# Patient Record
Sex: MALE | Race: WHITE | NOT HISPANIC OR LATINO | URBAN - METROPOLITAN AREA
[De-identification: names, ages, dates, MRNs, and addresses within clinical notes are randomized per-mention and may not be internally consistent; named-entity substitution may affect disease eponyms.]

---

## 2020-01-05 ENCOUNTER — INPATIENT (INPATIENT)
Facility: HOSPITAL | Age: 85
LOS: 7 days | Discharge: HOME HEALTH | DRG: 871 | End: 2020-01-13
Attending: INTERNAL MEDICINE | Admitting: INTERNAL MEDICINE
Payer: MEDICARE

## 2020-01-05 VITALS — RESPIRATION RATE: 18 BRPM | HEART RATE: 75 BPM | SYSTOLIC BLOOD PRESSURE: 110 MMHG | DIASTOLIC BLOOD PRESSURE: 54 MMHG

## 2020-01-05 DIAGNOSIS — A41.9 SEPSIS, UNSPECIFIED ORGANISM: ICD-10-CM

## 2020-01-05 DIAGNOSIS — I73.9 PERIPHERAL VASCULAR DISEASE, UNSPECIFIED: ICD-10-CM

## 2020-01-05 DIAGNOSIS — E78.5 HYPERLIPIDEMIA, UNSPECIFIED: ICD-10-CM

## 2020-01-05 DIAGNOSIS — E11.9 TYPE 2 DIABETES MELLITUS WITHOUT COMPLICATIONS: ICD-10-CM

## 2020-01-05 DIAGNOSIS — I10 ESSENTIAL (PRIMARY) HYPERTENSION: ICD-10-CM

## 2020-01-05 DIAGNOSIS — I73.9 PERIPHERAL VASCULAR DISEASE, UNSPECIFIED: Chronic | ICD-10-CM

## 2020-01-05 DIAGNOSIS — J69.0 PNEUMONITIS DUE TO INHALATION OF FOOD AND VOMIT: ICD-10-CM

## 2020-01-05 DIAGNOSIS — Z91.89 OTHER SPECIFIED PERSONAL RISK FACTORS, NOT ELSEWHERE CLASSIFIED: ICD-10-CM

## 2020-01-05 DIAGNOSIS — Z29.9 ENCOUNTER FOR PROPHYLACTIC MEASURES, UNSPECIFIED: ICD-10-CM

## 2020-01-05 DIAGNOSIS — R74.0 NONSPECIFIC ELEVATION OF LEVELS OF TRANSAMINASE AND LACTIC ACID DEHYDROGENASE [LDH]: ICD-10-CM

## 2020-01-05 DIAGNOSIS — I25.10 ATHEROSCLEROTIC HEART DISEASE OF NATIVE CORONARY ARTERY WITHOUT ANGINA PECTORIS: ICD-10-CM

## 2020-01-05 LAB
ACANTHOCYTES BLD QL SMEAR: SLIGHT — SIGNIFICANT CHANGE UP
ALBUMIN SERPL ELPH-MCNC: 2.2 G/DL — LOW (ref 3.3–5)
ALBUMIN SERPL ELPH-MCNC: 2.3 G/DL — LOW (ref 3.3–5)
ALP SERPL-CCNC: 132 U/L — HIGH (ref 40–120)
ALP SERPL-CCNC: SIGNIFICANT CHANGE UP U/L (ref 40–120)
ALT FLD-CCNC: 68 U/L — HIGH (ref 10–45)
ALT FLD-CCNC: SIGNIFICANT CHANGE UP U/L (ref 10–45)
ANION GAP SERPL CALC-SCNC: 14 MMOL/L — SIGNIFICANT CHANGE UP (ref 5–17)
ANION GAP SERPL CALC-SCNC: 14 MMOL/L — SIGNIFICANT CHANGE UP (ref 5–17)
ANISOCYTOSIS BLD QL: SLIGHT — SIGNIFICANT CHANGE UP
APPEARANCE UR: CLEAR — SIGNIFICANT CHANGE UP
APTT BLD: 26.6 SEC — LOW (ref 27.5–36.3)
AST SERPL-CCNC: 66 U/L — HIGH (ref 10–40)
AST SERPL-CCNC: SIGNIFICANT CHANGE UP U/L (ref 10–40)
B-OH-BUTYR SERPL-SCNC: 0.2 MMOL/L — SIGNIFICANT CHANGE UP
BASE EXCESS BLDV CALC-SCNC: -2.2 MMOL/L — SIGNIFICANT CHANGE UP
BASOPHILS # BLD AUTO: 0 K/UL — SIGNIFICANT CHANGE UP (ref 0–0.2)
BASOPHILS NFR BLD AUTO: 0 % — SIGNIFICANT CHANGE UP (ref 0–2)
BILIRUB SERPL-MCNC: 1 MG/DL — SIGNIFICANT CHANGE UP (ref 0.2–1.2)
BILIRUB SERPL-MCNC: 1 MG/DL — SIGNIFICANT CHANGE UP (ref 0.2–1.2)
BILIRUB UR-MCNC: NEGATIVE — SIGNIFICANT CHANGE UP
BUN SERPL-MCNC: 58 MG/DL — HIGH (ref 7–23)
BUN SERPL-MCNC: 61 MG/DL — HIGH (ref 7–23)
BURR CELLS BLD QL SMEAR: PRESENT — SIGNIFICANT CHANGE UP
CALCIUM SERPL-MCNC: 8.7 MG/DL — SIGNIFICANT CHANGE UP (ref 8.4–10.5)
CALCIUM SERPL-MCNC: 9.1 MG/DL — SIGNIFICANT CHANGE UP (ref 8.4–10.5)
CHLORIDE SERPL-SCNC: 107 MMOL/L — SIGNIFICANT CHANGE UP (ref 96–108)
CHLORIDE SERPL-SCNC: 110 MMOL/L — HIGH (ref 96–108)
CO2 SERPL-SCNC: 16 MMOL/L — LOW (ref 22–31)
CO2 SERPL-SCNC: 19 MMOL/L — LOW (ref 22–31)
COLOR SPEC: YELLOW — SIGNIFICANT CHANGE UP
CREAT SERPL-MCNC: 1.8 MG/DL — HIGH (ref 0.5–1.3)
CREAT SERPL-MCNC: 1.96 MG/DL — HIGH (ref 0.5–1.3)
DACRYOCYTES BLD QL SMEAR: SLIGHT — SIGNIFICANT CHANGE UP
DIFF PNL FLD: NEGATIVE — SIGNIFICANT CHANGE UP
EOSINOPHIL # BLD AUTO: 0 K/UL — SIGNIFICANT CHANGE UP (ref 0–0.5)
EOSINOPHIL NFR BLD AUTO: 0 % — SIGNIFICANT CHANGE UP (ref 0–6)
FLU A RESULT: SIGNIFICANT CHANGE UP
FLU A RESULT: SIGNIFICANT CHANGE UP
FLUAV AG NPH QL: SIGNIFICANT CHANGE UP
FLUBV AG NPH QL: SIGNIFICANT CHANGE UP
GLUCOSE BLDC GLUCOMTR-MCNC: 320 MG/DL — HIGH (ref 70–99)
GLUCOSE BLDC GLUCOMTR-MCNC: 341 MG/DL — HIGH (ref 70–99)
GLUCOSE SERPL-MCNC: 382 MG/DL — HIGH (ref 70–99)
GLUCOSE SERPL-MCNC: 413 MG/DL — HIGH (ref 70–99)
GLUCOSE UR QL: NEGATIVE — SIGNIFICANT CHANGE UP
HCO3 BLDV-SCNC: 24 MMOL/L — SIGNIFICANT CHANGE UP (ref 20–27)
HCT VFR BLD CALC: 30.9 % — LOW (ref 39–50)
HCT VFR BLD CALC: 38.9 % — LOW (ref 39–50)
HGB BLD-MCNC: 11.8 G/DL — LOW (ref 13–17)
HGB BLD-MCNC: 9.6 G/DL — LOW (ref 13–17)
HYPOCHROMIA BLD QL: SLIGHT — SIGNIFICANT CHANGE UP
INR BLD: 1.25 — HIGH (ref 0.88–1.16)
KETONES UR-MCNC: NEGATIVE — SIGNIFICANT CHANGE UP
LACTATE SERPL-SCNC: 2.1 MMOL/L — HIGH (ref 0.5–2)
LACTATE SERPL-SCNC: 2.5 MMOL/L — HIGH (ref 0.5–2)
LACTATE SERPL-SCNC: 3.3 MMOL/L — HIGH (ref 0.5–2)
LACTATE SERPL-SCNC: 3.5 MMOL/L — HIGH (ref 0.5–2)
LACTATE SERPL-SCNC: 3.5 MMOL/L — HIGH (ref 0.5–2)
LEUKOCYTE ESTERASE UR-ACNC: NEGATIVE — SIGNIFICANT CHANGE UP
LG PLATELETS BLD QL AUTO: SLIGHT — SIGNIFICANT CHANGE UP
LYMPHOCYTES # BLD AUTO: 1.1 K/UL — SIGNIFICANT CHANGE UP (ref 1–3.3)
LYMPHOCYTES # BLD AUTO: 3 % — LOW (ref 13–44)
MANUAL SMEAR VERIFICATION: SIGNIFICANT CHANGE UP
MCHC RBC-ENTMCNC: 26.2 PG — LOW (ref 27–34)
MCHC RBC-ENTMCNC: 26.4 PG — LOW (ref 27–34)
MCHC RBC-ENTMCNC: 30.3 GM/DL — LOW (ref 32–36)
MCHC RBC-ENTMCNC: 31.1 GM/DL — LOW (ref 32–36)
MCV RBC AUTO: 84.9 FL — SIGNIFICANT CHANGE UP (ref 80–100)
MCV RBC AUTO: 86.3 FL — SIGNIFICANT CHANGE UP (ref 80–100)
MONOCYTES # BLD AUTO: 0.37 K/UL — SIGNIFICANT CHANGE UP (ref 0–0.9)
MONOCYTES NFR BLD AUTO: 1 % — LOW (ref 2–14)
NEUTROPHILS # BLD AUTO: 35.36 K/UL — HIGH (ref 1.8–7.4)
NEUTROPHILS NFR BLD AUTO: 95 % — HIGH (ref 43–77)
NEUTS BAND # BLD: 1 % — SIGNIFICANT CHANGE UP (ref 0–8)
NITRITE UR-MCNC: NEGATIVE — SIGNIFICANT CHANGE UP
NRBC # BLD: 0 /100 WBCS — SIGNIFICANT CHANGE UP (ref 0–0)
NRBC # BLD: 0 /100 — SIGNIFICANT CHANGE UP (ref 0–0)
NRBC # BLD: SIGNIFICANT CHANGE UP /100 WBCS (ref 0–0)
OVALOCYTES BLD QL SMEAR: SLIGHT — SIGNIFICANT CHANGE UP
PCO2 BLDV: 45 MMHG — SIGNIFICANT CHANGE UP (ref 41–51)
PH BLDV: 7.34 — SIGNIFICANT CHANGE UP (ref 7.32–7.43)
PH UR: 5.5 — SIGNIFICANT CHANGE UP (ref 5–8)
PLAT MORPH BLD: ABNORMAL
PLATELET # BLD AUTO: 245 K/UL — SIGNIFICANT CHANGE UP (ref 150–400)
PLATELET # BLD AUTO: 262 K/UL — SIGNIFICANT CHANGE UP (ref 150–400)
PO2 BLDV: 16 MMHG — SIGNIFICANT CHANGE UP
POIKILOCYTOSIS BLD QL AUTO: SIGNIFICANT CHANGE UP
POLYCHROMASIA BLD QL SMEAR: SLIGHT — SIGNIFICANT CHANGE UP
POTASSIUM SERPL-MCNC: 5.1 MMOL/L — SIGNIFICANT CHANGE UP (ref 3.5–5.3)
POTASSIUM SERPL-MCNC: SIGNIFICANT CHANGE UP MMOL/L (ref 3.5–5.3)
POTASSIUM SERPL-SCNC: 5.1 MMOL/L — SIGNIFICANT CHANGE UP (ref 3.5–5.3)
POTASSIUM SERPL-SCNC: SIGNIFICANT CHANGE UP MMOL/L (ref 3.5–5.3)
PROT SERPL-MCNC: 6.4 G/DL — SIGNIFICANT CHANGE UP (ref 6–8.3)
PROT SERPL-MCNC: 6.8 G/DL — SIGNIFICANT CHANGE UP (ref 6–8.3)
PROT UR-MCNC: NEGATIVE MG/DL — SIGNIFICANT CHANGE UP
PROTHROM AB SERPL-ACNC: 14.4 SEC — HIGH (ref 10–12.9)
RBC # BLD: 3.64 M/UL — LOW (ref 4.2–5.8)
RBC # BLD: 4.51 M/UL — SIGNIFICANT CHANGE UP (ref 4.2–5.8)
RBC # FLD: 17.2 % — HIGH (ref 10.3–14.5)
RBC # FLD: 18.1 % — HIGH (ref 10.3–14.5)
RBC BLD AUTO: ABNORMAL
RSV RESULT: SIGNIFICANT CHANGE UP
RSV RNA RESP QL NAA+PROBE: SIGNIFICANT CHANGE UP
SAO2 % BLDV: 18 % — SIGNIFICANT CHANGE UP
SODIUM SERPL-SCNC: 137 MMOL/L — SIGNIFICANT CHANGE UP (ref 135–145)
SODIUM SERPL-SCNC: 143 MMOL/L — SIGNIFICANT CHANGE UP (ref 135–145)
SP GR SPEC: >=1.03 — SIGNIFICANT CHANGE UP (ref 1–1.03)
TROPONIN T SERPL-MCNC: 0.22 NG/ML — CRITICAL HIGH (ref 0–0.01)
UROBILINOGEN FLD QL: 0.2 E.U./DL — SIGNIFICANT CHANGE UP
WBC # BLD: 32.83 K/UL — HIGH (ref 3.8–10.5)
WBC # BLD: 36.83 K/UL — HIGH (ref 3.8–10.5)
WBC # FLD AUTO: 32.83 K/UL — HIGH (ref 3.8–10.5)
WBC # FLD AUTO: 36.83 K/UL — HIGH (ref 3.8–10.5)

## 2020-01-05 PROCEDURE — 99231 SBSQ HOSP IP/OBS SF/LOW 25: CPT | Mod: GC

## 2020-01-05 PROCEDURE — 93010 ELECTROCARDIOGRAM REPORT: CPT

## 2020-01-05 PROCEDURE — 71045 X-RAY EXAM CHEST 1 VIEW: CPT | Mod: 26

## 2020-01-05 PROCEDURE — 99285 EMERGENCY DEPT VISIT HI MDM: CPT

## 2020-01-05 PROCEDURE — 73630 X-RAY EXAM OF FOOT: CPT | Mod: 26,50

## 2020-01-05 RX ORDER — POVIDONE-IODINE 5 %
1 AEROSOL (ML) TOPICAL ONCE
Refills: 0 | Status: COMPLETED | OUTPATIENT
Start: 2020-01-05 | End: 2020-01-10

## 2020-01-05 RX ORDER — DEXTROSE 50 % IN WATER 50 %
25 SYRINGE (ML) INTRAVENOUS ONCE
Refills: 0 | Status: DISCONTINUED | OUTPATIENT
Start: 2020-01-05 | End: 2020-01-13

## 2020-01-05 RX ORDER — INSULIN GLARGINE 100 [IU]/ML
10 INJECTION, SOLUTION SUBCUTANEOUS AT BEDTIME
Refills: 0 | Status: DISCONTINUED | OUTPATIENT
Start: 2020-01-05 | End: 2020-01-13

## 2020-01-05 RX ORDER — SODIUM CHLORIDE 9 MG/ML
1000 INJECTION INTRAMUSCULAR; INTRAVENOUS; SUBCUTANEOUS ONCE
Refills: 0 | Status: COMPLETED | OUTPATIENT
Start: 2020-01-05 | End: 2020-01-05

## 2020-01-05 RX ORDER — GLUCAGON INJECTION, SOLUTION 0.5 MG/.1ML
1 INJECTION, SOLUTION SUBCUTANEOUS ONCE
Refills: 0 | Status: DISCONTINUED | OUTPATIENT
Start: 2020-01-05 | End: 2020-01-13

## 2020-01-05 RX ORDER — INSULIN LISPRO 100/ML
VIAL (ML) SUBCUTANEOUS
Refills: 0 | Status: DISCONTINUED | OUTPATIENT
Start: 2020-01-05 | End: 2020-01-13

## 2020-01-05 RX ORDER — SIMVASTATIN 20 MG/1
20 TABLET, FILM COATED ORAL AT BEDTIME
Refills: 0 | Status: DISCONTINUED | OUTPATIENT
Start: 2020-01-05 | End: 2020-01-13

## 2020-01-05 RX ORDER — SODIUM CHLORIDE 9 MG/ML
500 INJECTION INTRAMUSCULAR; INTRAVENOUS; SUBCUTANEOUS ONCE
Refills: 0 | Status: COMPLETED | OUTPATIENT
Start: 2020-01-05 | End: 2020-01-05

## 2020-01-05 RX ORDER — PIPERACILLIN AND TAZOBACTAM 4; .5 G/20ML; G/20ML
3.38 INJECTION, POWDER, LYOPHILIZED, FOR SOLUTION INTRAVENOUS ONCE
Refills: 0 | Status: COMPLETED | OUTPATIENT
Start: 2020-01-05 | End: 2020-01-05

## 2020-01-05 RX ORDER — VANCOMYCIN HCL 1 G
1000 VIAL (EA) INTRAVENOUS ONCE
Refills: 0 | Status: COMPLETED | OUTPATIENT
Start: 2020-01-05 | End: 2020-01-05

## 2020-01-05 RX ORDER — ACETAMINOPHEN 500 MG
650 TABLET ORAL ONCE
Refills: 0 | Status: COMPLETED | OUTPATIENT
Start: 2020-01-05 | End: 2020-01-05

## 2020-01-05 RX ORDER — DEXTROSE 50 % IN WATER 50 %
12.5 SYRINGE (ML) INTRAVENOUS ONCE
Refills: 0 | Status: DISCONTINUED | OUTPATIENT
Start: 2020-01-05 | End: 2020-01-13

## 2020-01-05 RX ORDER — SODIUM CHLORIDE 9 MG/ML
1000 INJECTION, SOLUTION INTRAVENOUS
Refills: 0 | Status: DISCONTINUED | OUTPATIENT
Start: 2020-01-05 | End: 2020-01-13

## 2020-01-05 RX ORDER — INSULIN HUMAN 100 [IU]/ML
4 INJECTION, SOLUTION SUBCUTANEOUS ONCE
Refills: 0 | Status: COMPLETED | OUTPATIENT
Start: 2020-01-05 | End: 2020-01-05

## 2020-01-05 RX ORDER — AMPICILLIN SODIUM AND SULBACTAM SODIUM 250; 125 MG/ML; MG/ML
3 INJECTION, POWDER, FOR SUSPENSION INTRAMUSCULAR; INTRAVENOUS EVERY 12 HOURS
Refills: 0 | Status: DISCONTINUED | OUTPATIENT
Start: 2020-01-05 | End: 2020-01-09

## 2020-01-05 RX ORDER — CLOPIDOGREL BISULFATE 75 MG/1
75 TABLET, FILM COATED ORAL DAILY
Refills: 0 | Status: DISCONTINUED | OUTPATIENT
Start: 2020-01-05 | End: 2020-01-13

## 2020-01-05 RX ORDER — LEVOTHYROXINE SODIUM 125 MCG
125 TABLET ORAL DAILY
Refills: 0 | Status: DISCONTINUED | OUTPATIENT
Start: 2020-01-05 | End: 2020-01-06

## 2020-01-05 RX ORDER — DEXTROSE 50 % IN WATER 50 %
15 SYRINGE (ML) INTRAVENOUS ONCE
Refills: 0 | Status: DISCONTINUED | OUTPATIENT
Start: 2020-01-05 | End: 2020-01-13

## 2020-01-05 RX ADMIN — INSULIN HUMAN 4 UNIT(S): 100 INJECTION, SOLUTION SUBCUTANEOUS at 15:25

## 2020-01-05 RX ADMIN — Medication 4: at 21:25

## 2020-01-05 RX ADMIN — SIMVASTATIN 20 MILLIGRAM(S): 20 TABLET, FILM COATED ORAL at 21:25

## 2020-01-05 RX ADMIN — Medication 650 MILLIGRAM(S): at 16:59

## 2020-01-05 RX ADMIN — Medication 250 MILLIGRAM(S): at 13:44

## 2020-01-05 RX ADMIN — PIPERACILLIN AND TAZOBACTAM 200 GRAM(S): 4; .5 INJECTION, POWDER, LYOPHILIZED, FOR SOLUTION INTRAVENOUS at 13:04

## 2020-01-05 RX ADMIN — SODIUM CHLORIDE 1000 MILLILITER(S): 9 INJECTION INTRAMUSCULAR; INTRAVENOUS; SUBCUTANEOUS at 10:45

## 2020-01-05 RX ADMIN — Medication 650 MILLIGRAM(S): at 17:16

## 2020-01-05 RX ADMIN — SODIUM CHLORIDE 1000 MILLILITER(S): 9 INJECTION INTRAMUSCULAR; INTRAVENOUS; SUBCUTANEOUS at 13:05

## 2020-01-05 RX ADMIN — SODIUM CHLORIDE 333.33 MILLILITER(S): 9 INJECTION INTRAMUSCULAR; INTRAVENOUS; SUBCUTANEOUS at 23:45

## 2020-01-05 RX ADMIN — AMPICILLIN SODIUM AND SULBACTAM SODIUM 200 GRAM(S): 250; 125 INJECTION, POWDER, FOR SUSPENSION INTRAMUSCULAR; INTRAVENOUS at 19:15

## 2020-01-05 RX ADMIN — INSULIN GLARGINE 10 UNIT(S): 100 INJECTION, SOLUTION SUBCUTANEOUS at 21:25

## 2020-01-05 NOTE — H&P ADULT - NSICDXPASTMEDICALHX_GEN_ALL_CORE_FT
PAST MEDICAL HISTORY:  CAD (coronary artery disease)     Diabetes     Hyperlipidemia     Hypertension     PVD (peripheral vascular disease)

## 2020-01-05 NOTE — CONSULT NOTE ADULT - ASSESSMENT
Patient is an 89 yo M w/ PMHx DM, HTN, HLD, recent revascularization surgery of left leg who presents with generalized weakness and AMS, found to have aspiration pneumonia.     #Severe sepsis 2/2 aspiration pneumonia  Patient w/ Patient is an 89 yo M w/ PMHx DM, HTN, HLD, recent revascularization surgery of left leg who presents with generalized weakness and AMS, found to have aspiration pneumonia.     #Severe sepsis 2/2 aspiration pneumonia  Patient w/ elevated WBC and temperature, CXR w/ b/l lower lobe consolidations. POCUS showed right complicated effusion. Likely cause is aspiration PNA as patient's daughter reports recent worsening cough when eating. Recent treatment w/ Abx 2 months ago but no recent hospitalizations or IV Abx.  -recommend Unasyn for aspiration PNA  -recommend pulm consult  -keep patient NPO  -Speech and swallow eval  -family requesting PEG if failing swallow eval    #Acute hypoxic respiratory failure.  Patient w/ SOB and desaturation on RA. Likely caused by shunt 2/2 consolidation. Patient comfortable on NC.  -c/w NC, titrate off supplemental O2 as tolerated.    Dispo: Admit to regional medical floors for continued excellent care per primary team.

## 2020-01-05 NOTE — ED ADULT TRIAGE NOTE - CHIEF COMPLAINT QUOTE
patient BIBA from home. as per family patient has been increasingly weak over the last 2 days. going to [facility] with [facility] and as not able to walk. denies chest pain. sob.

## 2020-01-05 NOTE — H&P ADULT - NSICDXPASTSURGICALHX_GEN_ALL_CORE_FT
PAST SURGICAL HISTORY:  No significant past surgical history PAST SURGICAL HISTORY:  PVD (peripheral vascular disease) s/p stent placement in left leg 3 months ago

## 2020-01-05 NOTE — H&P ADULT - PROBLEM SELECTOR PLAN 1
Labs showing severely elevated WBC, elevated lactate, and CXR with concern for aspiration pneumonia. Labs showing severely elevated WBC, elevated lactate, and CXR with showing moderate right effusion concerning for pneumonia  Sepsis (leukocytosis, fever, elevated lactate), with source 2/2 to PNA likely aspiration in the setting of cough and difficulty swallowing food   In the ED patient receive acetaminophen 650mg, zosyn 3.375g, vancomycin 1g, 2L NS bolus  started unasyn 3g q12 hours  Hold home anti hypertensives at home  f/u blood cx  f/u urine cx  f/u sputum cx  f/u urine legionella Labs showing severely elevated WBC, elevated lactate, and CXR with showing moderate right effusion concerning for pneumonia  Sepsis (leukocytosis, fever, elevated lactate), with source 2/2 to PNA likely aspiration in the setting of cough and difficulty swallowing food for 1 month  In the ED patient receive acetaminophen 650mg, vancomycin 1g, zosyn 3.375g, 2L NS bolus  started unasyn 3g q12 hours  Hold home anti hypertensives at home  f/u blood cx  f/u urine cx  f/u sputum cx  f/u urine legionella - Labs showing severely elevated WBC, elevated lactate, and CXR with showing moderate right effusion concerning for pneumonia  - Sepsis (leukocytosis, fever, elevated lactate), with source 2/2 to PNA likely aspiration in the setting of cough and difficulty swallowing food for 1 month  In the ED patient receive acetaminophen 650mg, vancomycin 1g, zosyn 3.375g, 2L NS bolus  - started unasyn 3g q12 hours  - Hold home anti hypertensives at home  - f/u blood cx  - f/u urine cx  - f/u sputum cx  - f/u urine legionella  - trend lactate - Labs showing severely elevated WBC, elevated lactate, and CXR with showing moderate right effusion concerning for pneumonia  - Sepsis (leukocytosis, fever, elevated lactate), with source 2/2 to PNA likely aspiration in the setting of cough and difficulty swallowing food for 1 month  In the ED patient receive acetaminophen 650mg, vancomycin 1g, zosyn 3.375g, 1L NS bolus  - started unasyn 3g q12 hours  - Hold home anti hypertensives at home  - f/u blood cx  - f/u urine cx  - f/u sputum cx  - f/u urine legionella  - trend lactate

## 2020-01-05 NOTE — H&P ADULT - NSHPPHYSICALEXAM_GEN_ALL_CORE
Vital Signs Last 12 Hrs  T(F): 100.6 (01-05-20 @ 16:41), Max: 100.6 (01-05-20 @ 16:41)  HR: 83 (01-05-20 @ 16:41) (65 - 83)  BP: 149/80 (01-05-20 @ 16:41) (97/53 - 149/80)  BP(mean): --  RR: 17 (01-05-20 @ 16:41) (17 - 18)  SpO2: 95% (01-05-20 @ 16:41) (95% - 96%)    PHYSICAL EXAM:  Constitutional: NAD, comfortable in bed.  HEENT: NC/AT, PERRLA, EOMI, no conjunctival pallor or scleral icterus, MMM  Neck: Supple, no JVD  Respiratory: CTA B/L. No w/r/r.   Cardiovascular: RRR, normal S1 and S2, no m/r/g.   Gastrointestinal: +BS, soft NTND, no guarding or rebound tenderness, no palpable masses   Extremities: wwp; no cyanosis, clubbing or edema.   Vascular: Pulses equal and strong throughout.   Neurological: AAOx3, no CN deficits, strength and sensation intact throughout.   Skin: No gross skin abnormalities or rashes Vital Signs Last 12 Hrs  T(F): 100.6 (01-05-20 @ 16:41), Max: 100.6 (01-05-20 @ 16:41)  HR: 83 (01-05-20 @ 16:41) (65 - 83)  BP: 149/80 (01-05-20 @ 16:41) (97/53 - 149/80)  BP(mean): --  RR: 17 (01-05-20 @ 16:41) (17 - 18)  SpO2: 95% (01-05-20 @ 16:41) (95% - 96%)    PHYSICAL EXAM:  Constitutional: Lying uncomfortably in bed on his side in distress, tachypneic, and rigorous, with occasional cough  HEENT: NC/AT, PERRLA, EOMI, no conjunctival pallor or scleral icterus, dry mucus membranes, poor dentition  Neck: lipoma in the neck posteriorly, supple, no JVD  Respiratory: crackles appreciated in the right side in anterior and posterior fields, decreased breath sounds on the left no w/r/r.   Cardiovascular: RRR, normal S1 and S2, no m/r/g.   Gastrointestinal: +BS, soft NTND, no guarding or rebound tenderness, no palpable masses   Extremities: multiple necrotic lesions in the feet bilaterally with swelling R>L, necrotic 4th toe on right foot, necrotic 3rd and 5th toe on left foot, surrounding areas of erythema, no discharge, multiple areas of ulcers and dry gangrene throughout the foot to the heal with surrounding areas of mild erythema. Left fatty lipoma in the left arm  Vascular: Pulses equal and strong throughout.   Neurological: AAOx0, cranial nerves, strength, and sensation cannot be assessed given current mental status in the setting of sever sepsis  Skin: multiple necrotic lesions in the feet bilaterally with swelling R>L, necrotic 4th toe on right foot, necrotic 3rd and 5th toe on left foot, surrounding areas of erythema, no discharge

## 2020-01-05 NOTE — H&P ADULT - PROBLEM SELECTOR PLAN 2
- speech and swallow eval  - Patient presenting with 2 month of productive cough yellow sputum, found to be febrile,   Found to have moderate sized right sided consolidation consisting with pneumonia  - s/p vancomycin 1g, zosyn 3.375g in the ED  - started unasyn 3g q12 hours  - speech and swallow eval Patient presenting with 2 month of productive cough yellow sputum, found to be febrile,   Found to have moderate sized right sided consolidation consisting with pneumonia  - s/p vancomycin 1g, zosyn 3.375g in the ED  - started unasyn 3g q12 hours  - speech and swallow eval  - pulm consult in the AM

## 2020-01-05 NOTE — H&P ADULT - PROBLEM SELECTOR PLAN 6
history of hyperlipidemia  - simvastatin 20mg daily    #Hypothyroidism  -history of hypothyroidism   -takes synthroid 125mcg at home history of hyperlipidemia  - simvastatin 20mg daily at home    #Hypothyroidism  -history of hypothyroidism   -takes synthroid 125mcg at home history of hyperlipidemia  - continue with home simvastatin 20mg daily    #Hypothyroidism  - history of hypothyroidism   - TSH 1.391  - continue home dose synthroid 125mcg

## 2020-01-05 NOTE — H&P ADULT - ASSESSMENT
89M w/ history of DM, HTN, HLD, surgical hx of uncertain vascular procedure in Left leg (presumed arthrectomy or angioplasty) in [place] about 3 months ago presents with generalized weakness and dry gangrene in bilateral feet/toes. 89M PMH DM, HTN, HLD, surgical hx of uncertain vascular procedure in left leg (presumed arthrectomy or angioplasty) presents with generalized weakness admitted with severe sepsis secondary to aspiration pneumonia.

## 2020-01-05 NOTE — H&P ADULT - PROBLEM SELECTOR PLAN 9
1) PCP Contacted on Admission: (Y/N) --> Name & Phone #:  2) Date of Contact with PCP:  3) PCP Contacted at Discharge: (Y/N)  4) Summary of Handoff Given to PCP:   5) Post-Discharge Appointment Date and Location: F: s/p 2L NS   E: Replete K<4, Mg<2  N: NPO

## 2020-01-05 NOTE — H&P ADULT - NSHPSOCIALHISTORY_GEN_ALL_CORE
denies smoking, alcohol, drug use. Lives at home with his daughter and has a 24 hour home health aide.

## 2020-01-05 NOTE — H&P ADULT - NSHPLABSRESULTS_GEN_ALL_CORE
LABS:                         11.8   36.83 )-----------( 262      ( 05 Jan 2020 11:12 )             38.9     01-05    143  |  110<H>  |  61<H>  ----------------------------<  413<H>  5.1   |  19<L>  |  1.96<H>    Ca    8.7      05 Jan 2020 12:37    TPro  6.4  /  Alb  2.2<L>  /  TBili  1.0  /  DBili  x   /  AST  66<H>  /  ALT  68<H>  /  AlkPhos  132<H>  01-05    PT/INR - ( 05 Jan 2020 12:37 )   PT: 14.4 sec;   INR: 1.25          PTT - ( 05 Jan 2020 12:37 )  PTT:26.6 sec  Urinalysis Basic - ( 05 Jan 2020 13:11 )    Color: Yellow / Appearance: Clear / SG: >=1.030 / pH: x  Gluc: x / Ketone: NEGATIVE  / Bili: Negative / Urobili: 0.2 E.U./dL   Blood: x / Protein: NEGATIVE mg/dL / Nitrite: NEGATIVE   Leuk Esterase: NEGATIVE / RBC: x / WBC x   Sq Epi: x / Non Sq Epi: x / Bacteria: x      CARDIAC MARKERS ( 05 Jan 2020 12:37 )  x     / 0.23 ng/mL / x     / x     / x            Lactate, Blood: 3.5 mmol/L (01-05 @ 15:24)  Lactate, Blood: 3.3 mmol/L (01-05 @ 12:37)      RADIOLOGY, EKG & ADDITIONAL TESTS:

## 2020-01-05 NOTE — ED PROVIDER NOTE - CONSTITUTIONAL, MLM
normal... Well appearing, awake, alert, oriented to person, place, time/situation and in no apparent distress; frail appearing. Well appearing, awake, alert, oriented and in no apparent distress; frail appearing.

## 2020-01-05 NOTE — ED PROVIDER NOTE - CLINICAL SUMMARY MEDICAL DECISION MAKING FREE TEXT BOX
cough and generalized weakness. pt well appearing. vss. labs noted. elevated wbc 36. antibiotics given. cx's sent. temp 99.7. fluid given but given heart disease and ? congestion on xray unable to give sepsis bolus. lactic 3.3. repeat lactic increased 3.5. troponin 0.23. ecg bi fasicular block.  Pt evaluated in ED by vascular for gangrene toes and ulcers. no acute intervention at this time. pt evaluated in ED by MICU and recommend regional admission.

## 2020-01-05 NOTE — H&P ADULT - PROBLEM SELECTOR PLAN 7
- past medical history of hypertension  - takes lisinopril/HCTZ 10/12.5mg and coreg 6.25mg BID at home  - holding home antihypertensives in the setting of sepsis

## 2020-01-05 NOTE — ED ADULT NURSE NOTE - OBJECTIVE STATEMENT
Pt presents to ED c/o BIB EMS c/o weakness. Per daughter pt felt weak Friday night, felt a little better yesterday, today went to [facility] and felt weak so brought to ED. Per daughter pt had mild cough with sputum production, no cough noted in ED. Per triage nurse pt A/Ox3 though pt only denies pain to this RN, not answering other questions. No fever rectally, pt with 91% O2 sat on RA, 98% 2L NC. Pt with multiple ulcers to bilateral feet, per daughter pt has had for ~1.5 years. Pt presents in NAD speaking full sentences via EMS stretcher through triage.

## 2020-01-05 NOTE — H&P ADULT - HISTORY OF PRESENT ILLNESS
ED Course: Vitals: T 100.6F HR 83 BP 97/53 RR 18 O2 96% 2L NC  Chest xray: Right effusion. Small left effusion cannot be excluded      Given Vanc 1g, zosyn 3.375g, 2L NS Bolus 89M w/ history of DM, HTN, HLD, surgical hx of uncertain vascular procedure in Left leg (presumed arthrectomy or angioplasty) in [place] about 3 months ago presents with generalized weakness and dry gangrene in bilateral feet/toes.    atient  notes having trouble moving from the lift to wheelchair today and was brought into the ER. Patient endorses a non productive cough 2x weeks. Denies fever, shortness of breath, nausea, vomiting, diarrhea, and chest pain. Patient notes having a stent recently placed in the left leg for peripheral vascular disease 3 months ago. No further complaints        ED Course: Vitals: T 100.6F HR 83 BP 97/53 RR 18 O2 96% 2L NC  Chest xray: Right effusion. Small left effusion cannot be excluded.   Labs: Wbc 36.83 Hb; 11.8 Plt 262 Cl 110 CO2 19 BUN/Cr 61/1.96 Glucose 413 Lactate 3.3 elevated alk phos and LFTs, trops 0.23 UA neg, RSV neg  Given Vanc 1g, zosyn 3.375g, 2L NS Bolus, blood and urine cultures drawn 89M PMH DM, HTN, HLD, surgical hx of uncertain vascular procedure in left leg (presumed arthrectomy or angioplasty) presents with generalized weakness. The patient is not able to speak when questioned during the encounter and the daughter at bedside is able to provide the history. As per the daughter, the patient was noted to be diagnosed with pneumonia 2 months ago and was started on    admitted with severe sepsis secondary to aspiration pneumonia. atient  notes having trouble moving from the lift to wheelchair today and was brought into the ER. Patient endorses a non productive cough 2x weeks. Denies fever, shortness of breath, nausea, vomiting, diarrhea, and chest pain. Patient notes having a stent recently placed in the left leg for peripheral vascular disease 3 months ago. No further complaints        ED Course: Vitals: T 100.6F HR 83 BP 97/53 RR 18 O2 96% 2L NC  Chest xray: Right effusion. Small left effusion cannot be excluded.   Labs: Wbc 36.83 Hb; 11.8 Plt 262 Cl 110 CO2 19 BUN/Cr 61/1.96 Glucose 413 Lactate 3.3 elevated alk phos and LFTs, trops 0.23 UA neg, RSV neg  Given Vanc 1g, zosyn 3.375g, 2L NS Bolus, blood and urine cultures drawn 89M PMH DM, HTN, HLD, surgical hx of uncertain vascular procedure in left leg (presumed arthrectomy or angioplasty) presents with generalized weakness. The patient is not able to speak when questioned during the encounter and the daughter at bedside is able to provide the history. As per the daughter, the patient was noted to be diagnosed with pneumonia 2 months ago and was treated with antibiotics as an outpatient. Since then, the patient has been having a progressive productive cough with yellow sputum. He has been having difficulty eating in which she notes that he coughs up his food upon swallowing. This past month he has been feeling progressively weaker and today had trouble moving from the car to the wheelchair which prompted his ER visit. Of note, the daughter notes that 3 months ago, the patient had a stent recently placed in the left leg for peripheral vascular disease. The patient is not able to give a history given his mental status and review of systems cannot be assessed.    ED Course: Vitals: T 100.6F HR 83 BP 97/53 RR 18 O2 96% 2L NC  Chest xray: Right effusion. Small left effusion cannot be excluded.   Labs: Wbc 36.83 Hb; 11.8 Plt 262 Cl 110 CO2 19 BUN/Cr 61/1.96 Glucose 413 Lactate 3.3 elevated alk phos and LFTs, trops 0.23 UA neg, RSV neg  Given Vanc 1g, zosyn 3.375g, 2L NS Bolus, acetaminophen 650mg suppository, insulin regular 4mg IVP, blood and urine cultures drawn 89M PMH DM, HTN, HLD, surgical hx of uncertain vascular procedure in left leg (presumed arthrectomy or angioplasty) presents with generalized weakness. The patient is not able to speak when questioned during the encounter and the daughter at bedside is able to provide the history. As per the daughter, the patient was noted to be diagnosed with pneumonia 2 months ago and was treated with antibiotics as an outpatient. Since then, the patient has been having a progressive productive cough with yellow sputum. He has been having difficulty eating in which she notes that he coughs up his food upon swallowing. This past month he has been feeling progressively weaker and today had trouble moving from the car to the wheelchair which prompted his ER visit. Of note, the daughter notes that 3 months ago, the patient had a stent recently placed in the left leg for peripheral vascular disease. The patient is not able to give a history given his mental status and review of systems cannot be assessed.    ED Course: Vitals: T 100.6F HR 83 BP 97/53 RR 18 O2 96% 1L NC  Chest xray: Right effusion. Small left effusion cannot be excluded.   Labs: Wbc 36.83 Hb; 11.8 Plt 262 Cl 110 CO2 19 BUN/Cr 61/1.96 Glucose 413 Lactate 3.3 elevated alk phos and LFTs, trops 0.23 UA neg, RSV neg  Given Vanc 1g, zosyn 3.375g, 1L NS Bolus, acetaminophen 650mg suppository, insulin regular 4mg IVP, blood and urine cultures drawn

## 2020-01-05 NOTE — CONSULT NOTE ADULT - SUBJECTIVE AND OBJECTIVE BOX
HPI: 89M w/ history of DM, HTN, HLD, surgical hx of uncertain vascular procedure in Left leg (presumed arthrectomy or angioplasty) in [place] about 3 months ago presents with generalized weakness. Vascular consulted for bilateral foot ulcers and dry gangrene. Per patient's family member, patient developed ulcers and pain in his feet, worse on the left and went to see a vascular surgeon about 3 months ago. He went to the OR for what sounds like an arthrectomy vs angioplasty and his pain improved. He is now experiencing pain on palpation in his feet but says that they don't hurt that much at rest. He feels weak and was unable to walk starting when he attempted to go to [facility] 2 days ago. No CP, SOB, dizziness, lightheadedness, chills. Subjective fevers at home per family member.     PMH: DM, HTN, HLD   PSH: unknown vascular procedure 3 months ago, hip replacement 2-3 yrs ago    PAST MEDICAL & SURGICAL HISTORY:  Diabetes    MEDICATIONS  (STANDING):  insulin regular  human recombinant. 4 Unit(s) IV Push once    Allergies    No Known Allergies    Intolerances    Vital Signs Last 24 Hrs  T(C): 36.4 (05 Jan 2020 13:21), Max: 37.6 (05 Jan 2020 11:14)  T(F): 97.5 (05 Jan 2020 13:21), Max: 99.7 (05 Jan 2020 11:14)  HR: 65 (05 Jan 2020 13:21) (65 - 75)  BP: 97/53 (05 Jan 2020 13:21) (97/53 - 110/54)  BP(mean): --  RR: 18 (05 Jan 2020 13:21) (18 - 18)  SpO2: 96% (05 Jan 2020 13:21) (96% - 96%)    I&O's Summary    05 Jan 2020 07:01  -  05 Jan 2020 15:19  --------------------------------------------------------  IN: 0 mL / OUT: 100 mL / NET: -100 mL        Physical Exam:  General: NAD, generalized malaise   HEENT: NC/AT, EOMI, normal hearing, no carotid bruits  Pulmonary: normal resp effort, CTA-B  Cardiovascular: NSR  Abdominal: soft, NT/ND, no organomegaly  Extremities:   Right foot: 1st toe dry gangrene, multiple small areas of dry gangrene throughout the foot, mild erythema surrounding the areas of gangrene with no purulence noted  Left foot: 1st toe with dry gangrene, 3rd toe auto-amputating with minimal attached skin, multiple areas of ulcers and dry gangrene throughout the foot to the heal with surrounding areas of mild erythema  Neuro: A/O x 3, CNs II-XII grossly intact, normal sensation, no focal deficits  Pulses:   Right:  FEM [X ]2+ [ ]1+ [ ]doppler    POP [X ]2+ [ ]1+ [ ]doppler  DP [ ]2+ [ ]1+ [X ]doppler biphasic  PT[ ]2+ [ ]1+ [X ]doppler triphasic     Left:  FEM [X ]2+ [ ]1+ [ ]doppler  POP [X ]2+ [ ]1+ [ ]doppler  DP [ ]2+ [ ]1+ [X ]doppler biphasic   PT [ ]2+ [ ]1+ [X ]doppler triphasic    Lines/drains/tubes:    LABS:                        11.8   36.83 )-----------( 262      ( 05 Jan 2020 11:12 )             38.9     01-05    143  |  110<H>  |  61<H>  ----------------------------<  413<H>  5.1   |  19<L>  |  1.96<H>    Ca    8.7      05 Jan 2020 12:37    TPro  6.4  /  Alb  2.2<L>  /  TBili  1.0  /  DBili  x   /  AST  66<H>  /  ALT  68<H>  /  AlkPhos  132<H>  01-05    PT/INR - ( 05 Jan 2020 12:37 )   PT: 14.4 sec;   INR: 1.25          PTT - ( 05 Jan 2020 12:37 )  PTT:26.6 sec  Urinalysis Basic - ( 05 Jan 2020 13:11 )    Color: Yellow / Appearance: Clear / SG: >=1.030 / pH: x  Gluc: x / Ketone: NEGATIVE  / Bili: Negative / Urobili: 0.2 E.U./dL   Blood: x / Protein: NEGATIVE mg/dL / Nitrite: NEGATIVE   Leuk Esterase: NEGATIVE / RBC: x / WBC x   Sq Epi: x / Non Sq Epi: x / Bacteria: x      CAPILLARY BLOOD GLUCOSE      POCT Blood Glucose.: 413 mg/dL (05 Jan 2020 14:38)    LIVER FUNCTIONS - ( 05 Jan 2020 12:37 )  Alb: 2.2 g/dL / Pro: 6.4 g/dL / ALK PHOS: 132 U/L / ALT: 68 U/L / AST: 66 U/L / GGT: x             Cultures:      RADIOLOGY & ADDITIONAL STUDIES:      Assessment: 89M w/ history of DM, HTN, HLD, surgical hx of uncertain vascular procedure in Left leg (presumed arthrectomy or angioplasty) in [place] about 3 months ago presents with generalized weakness and dry gangrene in bilateral feet/toes. Labs showing severely elevated WBC, elevated lactate, and CXR with concern for aspiration pneumonia. Patient being admitted to medicine vs MICU. His 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. Although there are some areas of erythema that could reflect mild cellulitis, his sepsis is likely due to an underlying pulmonary etiology and not from the feet.     Recommendations:  - no surgical intervention required at this time  - wound care: apply betadine to areas of gangrene/necrosis, wrap the feet with kerlex  - IV antibiotics for suspected pneumonia per primary team  - let the Left third toe auto-amputate on its own  - discussed with Chief on call  - call x [phone] with questions

## 2020-01-05 NOTE — CONSULT NOTE ADULT - SUBJECTIVE AND OBJECTIVE BOX
ICU CONSULT NOTE    CHIEF COMPLAINT: Patient is a 89y old  Male who presents with a chief complaint of sepsis secondary to aspiration pneumonia (05 Jan 2020 17:36)    HPI:  Patient is an 89 yo M w/ PMHx DM, HTN, HLD, recent revascularization surgery of left leg who presents with generalized weakness and AMS. The patient's daughter is at bedside who provided the story. The daughter state that 3 days ago the patient had an episode of vomiting/coughing up food after eating. Since then he has been weaker and less interactive. She reports he has been coughing when he eats for a long time now and that 2 months ago he was treated with Abx provided by a visiting nurse and was treated with abx for a bronchitis.     The patient is not able to speak when questioned during the encounter and the daughter at bedside is able to provide the history. As per the daughter, the patient was noted to be diagnosed with pneumonia 2 months ago and was treated with antibiotics as an outpatient. Since then, the patient has been having a progressive productive cough with yellow sputum. He has been having difficulty eating in which she notes that he coughs up his food upon swallowing. This past month he has been feeling progressively weaker and today had trouble moving from the car to the wheelchair which prompted his ER visit. Of note, the daughter notes that 3 months ago, the patient had a stent recently placed in the left leg for peripheral vascular disease. The patient is not able to give a history given his mental status and review of systems cannot be assessed.    ED Course: Vitals: T 100.6F HR 83 BP 97/53 RR 18 O2 96% 2L NC  Chest xray: Right effusion. Small left effusion cannot be excluded.   Labs: Wbc 36.83 Hb; 11.8 Plt 262 Cl 110 CO2 19 BUN/Cr 61/1.96 Glucose 413 Lactate 3.3 elevated alk phos and LFTs, trops 0.23 UA neg, RSV neg  Given Vanc 1g, zosyn 3.375g, 2L NS Bolus, acetaminophen 650mg suppository, insulin regular 4mg IVP, blood and urine cultures drawn (05 Jan 2020 17:25)      PAST MEDICAL & SURGICAL HISTORY:  PVD (peripheral vascular disease)  CAD (coronary artery disease)  Hyperlipidemia  Hypertension  Diabetes  PVD (peripheral vascular disease): s/p stent placement in left leg 3 months ago      REVIEW OF SYSTEMS: negative except as stated in HPI.    MEDICATIONS  (STANDING):  ampicillin/sulbactam  IVPB 3 Gram(s) IV Intermittent every 12 hours  insulin glargine Injectable (LANTUS) 10 Unit(s) SubCutaneous at bedtime  povidone iodine 10% Solution 1 Application(s) Topical once    MEDICATIONS  (PRN):      Allergies    No Known Allergies    Intolerances        FAMILY HISTORY:      SOCIAL HISTORY:     Vital Signs Last 24 Hrs  T(C): 37.9 (05 Jan 2020 17:55), Max: 38.1 (05 Jan 2020 16:41)  T(F): 100.2 (05 Jan 2020 17:55), Max: 100.6 (05 Jan 2020 16:41)  HR: 83 (05 Jan 2020 16:41) (65 - 83)  BP: 149/80 (05 Jan 2020 16:41) (97/53 - 149/80)  BP(mean): --  RR: 17 (05 Jan 2020 16:41) (17 - 18)  SpO2: 95% (05 Jan 2020 16:41) (95% - 96%)    PHYSICAL EXAM:  GEN: alert, NAD, comfortable in bed  HEENT: PERRL, no relative afferent pupillary defect, EOMI, moist MM, no pharyngeal erythema or exudate  CV: RRR, S1/S2, no murmurs appreciated, no JVD, no carotid bruits  RESP: CTA bilaterally, good respiratory effort, good air movement, no wheezes/rales  ABD: soft, BS+, nontender, nondistended, no guarding/rebound  EXTREMITIES: WWP, pulses 2+ in all 4 extremities, no peripheral edema  MSK: full range of motion of all 4 extremities  SKIN: warm, dry, intact, no rashes  NEURO: A&Ox3, no focal deficits, strength 5/5 throughout, no sensory deficits  PSYC: normal mood/affect    LABS: reviewed.    CAPILLARY BLOOD GLUCOSE      POCT Blood Glucose.: 341 mg/dL (05 Jan 2020 17:00)  POCT Blood Glucose.: 407 mg/dL (05 Jan 2020 15:55)  POCT Blood Glucose.: 413 mg/dL (05 Jan 2020 14:38)                          9.6    32.83 )-----------( 245      ( 05 Jan 2020 18:27 )             30.9     01-05    143  |  110<H>  |  61<H>  ----------------------------<  413<H>  5.1   |  19<L>  |  1.96<H>    Ca    8.7      05 Jan 2020 12:37    TPro  6.4  /  Alb  2.2<L>  /  TBili  1.0  /  DBili  x   /  AST  66<H>  /  ALT  68<H>  /  AlkPhos  132<H>  01-05    PT/INR - ( 05 Jan 2020 12:37 )   PT: 14.4 sec;   INR: 1.25          PTT - ( 05 Jan 2020 12:37 )  PTT:26.6 sec  LIVER FUNCTIONS - ( 05 Jan 2020 12:37 )  Alb: 2.2 g/dL / Pro: 6.4 g/dL / ALK PHOS: 132 U/L / ALT: 68 U/L / AST: 66 U/L / GGT: x             Urinalysis Basic - ( 05 Jan 2020 13:11 )    Color: Yellow / Appearance: Clear / SG: >=1.030 / pH: x  Gluc: x / Ketone: NEGATIVE  / Bili: Negative / Urobili: 0.2 E.U./dL   Blood: x / Protein: NEGATIVE mg/dL / Nitrite: NEGATIVE   Leuk Esterase: NEGATIVE / RBC: x / WBC x   Sq Epi: x / Non Sq Epi: x / Bacteria: x      CARDIAC MARKERS ( 05 Jan 2020 18:28 )  x     / 0.22 ng/mL / x     / x     / x      CARDIAC MARKERS ( 05 Jan 2020 12:37 )  x     / 0.23 ng/mL / x     / x     / x              RADIOLOGY AND ADDITIONAL TESTS: reviewed.    Chest XR:  EKG:  Echocardiogram: ICU CONSULT NOTE    CHIEF COMPLAINT: Patient is a 89y old  Male who presents with a chief complaint of sepsis secondary to aspiration pneumonia (05 Jan 2020 17:36)    HPI:  Patient is an 89 yo M w/ PMHx DM, HTN, HLD, recent revascularization surgery of left leg who presents with generalized weakness and AMS. The patient's daughter is at bedside who provided the story. The daughter state that 3 days ago the patient had an episode of vomiting/coughing up food after eating. Since then he has been weaker and less interactive. She reports he has been coughing when he eats for a long time now and that 2 months ago he was treated with Abx provided by a visiting nurse and was treated with abx for a bronchitis.    ED Course: Vitals: T 100.6F HR 83 BP 97/53 RR 18 O2 96% 2L NC  Chest xray: Bilateral lower lobe opacities  POCUS: Right lower lobe consolidation and complicated effusion, LLL consolidation.     Labs: Wbc 36.83 Hb; 11.8 Plt 262 Cl 110 CO2 19 BUN/Cr 61/1.96 Glucose 413 Lactate 3.3 elevated alk phos and LFTs, trops 0.23 UA neg, RSV neg  Given Vanc 1g, zosyn 3.375g, 2L NS Bolus, acetaminophen 650mg suppository, insulin regular 4mg IVP, blood and urine cultures drawn (05 Jan 2020 17:25)      PAST MEDICAL & SURGICAL HISTORY:  PVD (peripheral vascular disease)  CAD (coronary artery disease)  Hyperlipidemia  Hypertension  Diabetes  PVD (peripheral vascular disease): s/p stent placement in left leg 3 months ago      REVIEW OF SYSTEMS: negative except as stated in HPI.    MEDICATIONS  (STANDING):  ampicillin/sulbactam  IVPB 3 Gram(s) IV Intermittent every 12 hours  insulin glargine Injectable (LANTUS) 10 Unit(s) SubCutaneous at bedtime  povidone iodine 10% Solution 1 Application(s) Topical once    MEDICATIONS  (PRN):      Allergies    No Known Allergies    Intolerances    FAMILY HISTORY:      SOCIAL HISTORY:   Denies tobacco, alcohol and recreational drug use. Lives at home with his daughter and has a 24 hour home health aide.    Vital Signs Last 24 Hrs  T(C): 37.9 (05 Jan 2020 17:55), Max: 38.1 (05 Jan 2020 16:41)  T(F): 100.2 (05 Jan 2020 17:55), Max: 100.6 (05 Jan 2020 16:41)  HR: 83 (05 Jan 2020 16:41) (65 - 83)  BP: 149/80 (05 Jan 2020 16:41) (97/53 - 149/80)  BP(mean): --  RR: 17 (05 Jan 2020 16:41) (17 - 18)  SpO2: 95% (05 Jan 2020 16:41) (95% - 96%)    PHYSICAL EXAM:  GEN: alert, NAD, comfortable in bed  HEENT: PERRL, no relative afferent pupillary defect, EOMI, moist MM, no pharyngeal erythema or exudate  CV: RRR, S1/S2, no murmurs appreciated, no JVD, no carotid bruits  RESP: CTA bilaterally, good respiratory effort, good air movement, no wheezes/rales  ABD: soft, BS+, nontender, nondistended, no guarding/rebound  EXTREMITIES: WWP, pulses 2+ in all 4 extremities, no peripheral edema  MSK: full range of motion of all 4 extremities  SKIN: warm, dry, intact, no rashes  NEURO: A&Ox3, no focal deficits, strength 5/5 throughout, no sensory deficits  PSYC: normal mood/affect    LABS: reviewed.    CAPILLARY BLOOD GLUCOSE      POCT Blood Glucose.: 341 mg/dL (05 Jan 2020 17:00)  POCT Blood Glucose.: 407 mg/dL (05 Jan 2020 15:55)  POCT Blood Glucose.: 413 mg/dL (05 Jan 2020 14:38)                          9.6    32.83 )-----------( 245      ( 05 Jan 2020 18:27 )             30.9     01-05    143  |  110<H>  |  61<H>  ----------------------------<  413<H>  5.1   |  19<L>  |  1.96<H>    Ca    8.7      05 Jan 2020 12:37    TPro  6.4  /  Alb  2.2<L>  /  TBili  1.0  /  DBili  x   /  AST  66<H>  /  ALT  68<H>  /  AlkPhos  132<H>  01-05    PT/INR - ( 05 Jan 2020 12:37 )   PT: 14.4 sec;   INR: 1.25          PTT - ( 05 Jan 2020 12:37 )  PTT:26.6 sec  LIVER FUNCTIONS - ( 05 Jan 2020 12:37 )  Alb: 2.2 g/dL / Pro: 6.4 g/dL / ALK PHOS: 132 U/L / ALT: 68 U/L / AST: 66 U/L / GGT: x             Urinalysis Basic - ( 05 Jan 2020 13:11 )    Color: Yellow / Appearance: Clear / SG: >=1.030 / pH: x  Gluc: x / Ketone: NEGATIVE  / Bili: Negative / Urobili: 0.2 E.U./dL   Blood: x / Protein: NEGATIVE mg/dL / Nitrite: NEGATIVE   Leuk Esterase: NEGATIVE / RBC: x / WBC x   Sq Epi: x / Non Sq Epi: x / Bacteria: x      CARDIAC MARKERS ( 05 Jan 2020 18:28 )  x     / 0.22 ng/mL / x     / x     / x      CARDIAC MARKERS ( 05 Jan 2020 12:37 )  x     / 0.23 ng/mL / x     / x     / x              RADIOLOGY AND ADDITIONAL TESTS: reviewed.    Chest XR:  EKG:  Echocardiogram: ICU CONSULT NOTE    CHIEF COMPLAINT: Patient is a 89y old  Male who presents with a chief complaint of sepsis secondary to aspiration pneumonia (05 Jan 2020 17:36)    HPI:  Patient is an 89 yo M w/ PMHx DM, HTN, HLD, recent revascularization surgery of left leg who presents with generalized weakness and AMS. The patient's daughter is at bedside who provided the story. The daughter state that 3 days ago the patient had an episode of vomiting/coughing up food after eating. Since then he has been weaker and less interactive. She reports he has been coughing when he eats for a long time now and that 2 months ago he was treated with Abx provided by a visiting nurse and was treated with abx for a bronchitis.    ED Course: Vitals: T 100.6F HR 83 BP 97/53 RR 18 O2 96% 2L NC  Chest xray: Bilateral lower lobe opacities  POCUS: Right lower lobe consolidation and complicated effusion, LLL consolidation.     Labs: Wbc 36.83 Hb; 11.8 Plt 262 Cl 110 CO2 19 BUN/Cr 61/1.96 Glucose 413 Lactate 3.3 elevated alk phos and LFTs, trops 0.23 UA neg, RSV neg  Given Vanc 1g, zosyn 3.375g, 2L NS Bolus, acetaminophen 650mg suppository, insulin regular 4mg IVP, blood and urine cultures drawn (05 Jan 2020 17:25)      PAST MEDICAL & SURGICAL HISTORY:  PVD (peripheral vascular disease)  CAD (coronary artery disease)  Hyperlipidemia  Hypertension  Diabetes  PVD (peripheral vascular disease): s/p stent placement in left leg 3 months ago      REVIEW OF SYSTEMS: negative except as stated in HPI.    MEDICATIONS  (STANDING):  ampicillin/sulbactam  IVPB 3 Gram(s) IV Intermittent every 12 hours  insulin glargine Injectable (LANTUS) 10 Unit(s) SubCutaneous at bedtime  povidone iodine 10% Solution 1 Application(s) Topical once    MEDICATIONS  (PRN):      Allergies    No Known Allergies    Intolerances    FAMILY HISTORY:  Denies significant Family history.     SOCIAL HISTORY:   Denies tobacco, alcohol and recreational drug use. Lives at home with his daughter and has a 24 hour home health aide.    Vital Signs Last 24 Hrs  T(C): 37.9 (05 Jan 2020 17:55), Max: 38.1 (05 Jan 2020 16:41)  T(F): 100.2 (05 Jan 2020 17:55), Max: 100.6 (05 Jan 2020 16:41)  HR: 83 (05 Jan 2020 16:41) (65 - 83)  BP: 149/80 (05 Jan 2020 16:41) (97/53 - 149/80)  BP(mean): --  RR: 17 (05 Jan 2020 16:41) (17 - 18)  SpO2: 95% (05 Jan 2020 16:41) (95% - 96%)    PHYSICAL EXAM:  GEN: alert, NAD, comfortable in bed  HEENT: PERRL, no relative afferent pupillary defect, EOMI, moist MM, no pharyngeal erythema or exudate  CV: RRR, S1/S2, no murmurs appreciated, no JVD, no carotid bruits  RESP: CTA bilaterally, good respiratory effort, good air movement, no wheezes/rales  ABD: soft, BS+, nontender, nondistended, no guarding/rebound  EXTREMITIES: WWP, pulses 2+ in all 4 extremities, no peripheral edema  MSK: full range of motion of all 4 extremities  SKIN: warm, dry, intact, no rashes  NEURO: A&Ox3, no focal deficits, strength 5/5 throughout, no sensory deficits  PSYC: normal mood/affect    LABS: reviewed.    CAPILLARY BLOOD GLUCOSE      POCT Blood Glucose.: 341 mg/dL (05 Jan 2020 17:00)  POCT Blood Glucose.: 407 mg/dL (05 Jan 2020 15:55)  POCT Blood Glucose.: 413 mg/dL (05 Jan 2020 14:38)                          9.6    32.83 )-----------( 245      ( 05 Jan 2020 18:27 )             30.9     01-05    143  |  110<H>  |  61<H>  ----------------------------<  413<H>  5.1   |  19<L>  |  1.96<H>    Ca    8.7      05 Jan 2020 12:37  TPro  6.4  /  Alb  2.2<L>  /  TBili  1.0  /  DBili  x   /  AST  66<H>  /  ALT  68<H>  /  AlkPhos  132<H>  01-05  PT/INR - ( 05 Jan 2020 12:37 )   PT: 14.4 sec;   INR: 1.25        PTT - ( 05 Jan 2020 12:37 )  PTT:26.6 sec  LIVER FUNCTIONS - ( 05 Jan 2020 12:37 )  Alb: 2.2 g/dL / Pro: 6.4 g/dL / ALK PHOS: 132 U/L / ALT: 68 U/L / AST: 66 U/L / GGT: x           Urinalysis Basic - ( 05 Jan 2020 13:11 )  Color: Yellow / Appearance: Clear / SG: >=1.030 / pH: x  Gluc: x / Ketone: NEGATIVE  / Bili: Negative / Urobili: 0.2 E.U./dL   Blood: x / Protein: NEGATIVE mg/dL / Nitrite: NEGATIVE   Leuk Esterase: NEGATIVE / RBC: x / WBC x   Sq Epi: x / Non Sq Epi: x / Bacteria: x    CARDIAC MARKERS ( 05 Jan 2020 18:28 )  x     / 0.22 ng/mL / x     / x     / x      CARDIAC MARKERS ( 05 Jan 2020 12:37 )  x     / 0.23 ng/mL / x     / x     / x        RADIOLOGY AND ADDITIONAL TESTS: as above. ICU CONSULT NOTE    CHIEF COMPLAINT: Patient is a 89y old  Male who presents with a chief complaint of sepsis secondary to aspiration pneumonia (05 Jan 2020 17:36)    HPI:  Patient is an 89 yo M w/ PMHx DM, HTN, HLD, recent revascularization surgery of left leg who presents with generalized weakness and AMS. The patient's daughter is at bedside who provided the story. The daughter state that 3 days ago the patient had an episode of vomiting/coughing up food after eating. Since then he has been weaker and less interactive. She reports he has been coughing when he eats for a long time now and that 2 months ago he was treated with Abx provided by a visiting nurse and was treated with abx for a bronchitis.    ED Course: Vitals: T 100.6F HR 83 BP 97/53 RR 18 O2 96% 2L NC  Chest xray: Bilateral lower lobe opacities  POCUS: Right lower lobe consolidation and complicated effusion, LLL consolidation.     Labs: Wbc 36.83 Hb; 11.8 Plt 262 Cl 110 CO2 19 BUN/Cr 61/1.96 Glucose 413 Lactate 3.3 elevated alk phos and LFTs, trops 0.23 UA neg, RSV neg  Given Vanc 1g, zosyn 3.375g, 2L NS Bolus, acetaminophen 650mg suppository, insulin regular 4mg IVP, blood and urine cultures drawn (05 Jan 2020 17:25)      PAST MEDICAL & SURGICAL HISTORY:  PVD (peripheral vascular disease)  CAD (coronary artery disease)  Hyperlipidemia  Hypertension  Diabetes  PVD (peripheral vascular disease): s/p stent placement in left leg 3 months ago      REVIEW OF SYSTEMS: negative except as stated in HPI.    MEDICATIONS  (STANDING):  ampicillin/sulbactam  IVPB 3 Gram(s) IV Intermittent every 12 hours  insulin glargine Injectable (LANTUS) 10 Unit(s) SubCutaneous at bedtime  povidone iodine 10% Solution 1 Application(s) Topical once    MEDICATIONS  (PRN):      Allergies    No Known Allergies    Intolerances    FAMILY HISTORY:  Denies significant Family history.     SOCIAL HISTORY:   Denies tobacco, alcohol and recreational drug use. Lives at home with his daughter and has a 24 hour home health aide.    Vital Signs Last 24 Hrs  T(C): 37.9 (05 Jan 2020 17:55), Max: 38.1 (05 Jan 2020 16:41)  T(F): 100.2 (05 Jan 2020 17:55), Max: 100.6 (05 Jan 2020 16:41)  HR: 83 (05 Jan 2020 16:41) (65 - 83)  BP: 149/80 (05 Jan 2020 16:41) (97/53 - 149/80)  BP(mean): --  RR: 17 (05 Jan 2020 16:41) (17 - 18)  SpO2: 95% (05 Jan 2020 16:41) (95% - 96%)    PHYSICAL EXAM:  GEN: Patient lying in bed, breathing comfortably  HEENT: PERRL, dry MM  CV: RRR, S1/S2, no murmurs appreciated, no JVD  RESP: Decreased breath sounds at lung bases bilaterally, no wheezes/rales  ABD: soft, BS+, nontender, nondistended, no guarding/rebound  EXTREMITIES: Upper extremities warm and well perfused, lower extremities w/ multiple areas of dry gangrene, auto-amputation of left middle toe  SKIN: warm, dry, intact, no rashes other than gangrenous lesions noted above  NEURO: Patient easily arousable but not answering questions or following commands, contracted but moving extremities.    LABS: reviewed.    CAPILLARY BLOOD GLUCOSE      POCT Blood Glucose.: 341 mg/dL (05 Jan 2020 17:00)  POCT Blood Glucose.: 407 mg/dL (05 Jan 2020 15:55)  POCT Blood Glucose.: 413 mg/dL (05 Jan 2020 14:38)                          9.6    32.83 )-----------( 245      ( 05 Jan 2020 18:27 )             30.9     01-05    143  |  110<H>  |  61<H>  ----------------------------<  413<H>  5.1   |  19<L>  |  1.96<H>    Ca    8.7      05 Jan 2020 12:37  TPro  6.4  /  Alb  2.2<L>  /  TBili  1.0  /  DBili  x   /  AST  66<H>  /  ALT  68<H>  /  AlkPhos  132<H>  01-05  PT/INR - ( 05 Jan 2020 12:37 )   PT: 14.4 sec;   INR: 1.25        PTT - ( 05 Jan 2020 12:37 )  PTT:26.6 sec  LIVER FUNCTIONS - ( 05 Jan 2020 12:37 )  Alb: 2.2 g/dL / Pro: 6.4 g/dL / ALK PHOS: 132 U/L / ALT: 68 U/L / AST: 66 U/L / GGT: x           Urinalysis Basic - ( 05 Jan 2020 13:11 )  Color: Yellow / Appearance: Clear / SG: >=1.030 / pH: x  Gluc: x / Ketone: NEGATIVE  / Bili: Negative / Urobili: 0.2 E.U./dL   Blood: x / Protein: NEGATIVE mg/dL / Nitrite: NEGATIVE   Leuk Esterase: NEGATIVE / RBC: x / WBC x   Sq Epi: x / Non Sq Epi: x / Bacteria: x    CARDIAC MARKERS ( 05 Jan 2020 18:28 )  x     / 0.22 ng/mL / x     / x     / x      CARDIAC MARKERS ( 05 Jan 2020 12:37 )  x     / 0.23 ng/mL / x     / x     / x        RADIOLOGY AND ADDITIONAL TESTS: as above.

## 2020-01-05 NOTE — H&P ADULT - PROBLEM SELECTOR PLAN 8
F: s/p 2L NS   E: Replete K<4, Mg<2  N: NPO - history of diabetes  - takes januvia 50mg and nateglinide 60mg at home  - presenting with glucose of 413  - started on lantus 10 - history of diabetes  - takes januvia 50mg and nateglinide 60mg at home  - presenting with glucose of 413, given insulin 4mg IVP  - started on lantus 10 tonight - history of diabetes  - holding home doses of januvia 50mg and nateglinide 60mg   - presenting with glucose of 413, given insulin 4mg IVP in the ED  - no ketones on the UA  - BHB 0.2  - started on lantus 10 tonight  - ISS

## 2020-01-05 NOTE — ED PROVIDER NOTE - OBJECTIVE STATEMENT
89y M with a history of DM, HTN, hyperlipidemia, CAD, and peripheral vascular disease presents to the ED with complains of generalized weakness 3x days. Patient  notes having trouble moving from the lift to wheelchair today and was brought into the ER. Patient endorses a non productive cough 2x weeks. Denies fever, shortness of breath, nausea, vomiting, diarrhea, and chest pain. Patient notes having a stent recently placed in the left leg for peripheral vascular disease 3 months ago. 89y M with a history of DM, HTN, hyperlipidemia, CAD, and peripheral vascular disease presents to the ED with complains of generalized weakness 3x days. Patient  notes having trouble moving from the lift to wheelchair today and was brought into the ER. Patient endorses a non productive cough 2x weeks. Denies fever, shortness of breath, nausea, vomiting, diarrhea, and chest pain. Patient notes having a stent recently placed in the left leg for peripheral vascular disease 3 months ago. No further complaints.

## 2020-01-05 NOTE — ED PROVIDER NOTE - ATTENDING CONTRIBUTION TO CARE
89 y male h/o DM, HTN, hyperlipidemia, CAD, and peripheral vascular disease s/p recent angioplasty LLE brought by family for c/o gen weakness x 3 d. + nonproductive cough usually after eating w/o associated uri sx, sob.  Family noted wounds bilat feet that have been present intermittently but worse lately.  No c/o abd pain, n/v/d.  No dysuria.  History from family. Ill appearing, nad, nc/at, lung distant bs, decreased bilat bases, heart reg, abd soft, nt, ext bilat dopplerable dp but R > L, bilat feet w necrotic eschar and mild surrounding erythema mult toes, lateral and medial foot, heel.  Pt c/o weakness w cough ? pna, less likely viral uri since no uri sx. Pt also w gangrene bilat feet.  Plan for abx, labs, cxr, vasc consult, plan for admit.

## 2020-01-05 NOTE — ED PROVIDER NOTE - SKIN, MLM
Skin normal color for race, warm, dry and intact. No evidence of rash. Multiple necrotic lesions to bilateral feet with mild swelling, right foot necrotic 4th toe, left foot necrotic 3rd and 5th toe; doppler signal bilaterally but left less than right; both feet with mild swelling, no erythema, no discharge; right upper back beginning of 2cm mild erythema, no ulcer present.

## 2020-01-05 NOTE — H&P ADULT - PROBLEM SELECTOR PLAN 3
His 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. Although there are some areas of erythema that could reflect mild cellulitis, his sepsis is likely due to an underlying pulmonary etiology  - vascular surgery consulted  - no surgical intervention required at this time  - wound care: apply betadine to areas of gangrene/necrosis, wrap the feet with kerlex  - IV antibiotics for suspected pneumonia per primary team His 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. Although there are some areas of erythema that could reflect mild cellulitis, his sepsis is likely due to an underlying pulmonary etiology  - vascular surgery consulted  - no surgical intervention required at this time  - wound care: apply betadine to areas of gangrene/necrosis, wrap the feet with kerlex

## 2020-01-05 NOTE — ED ADULT NURSE NOTE - NS ED PATIENT SAFETY CONCERN
(0) No drift; leg holds 30 degree position for full 5 secs (0) No drift; leg holds 30 degree position for full 5 secs (0) No drift; leg holds 30 degree position for full 5 secs No

## 2020-01-05 NOTE — ED ADULT NURSE NOTE - CHIEF COMPLAINT QUOTE
patient BIBA from home. as per family patient has been increasingly weak over the last 2 days. going to [employer] with [facility] and as not able to walk. denies chest pain. sob.

## 2020-01-05 NOTE — ED PROVIDER NOTE - PMH
Diabetes CAD (coronary artery disease)    Diabetes    Hyperlipidemia    Hypertension    PVD (peripheral vascular disease)

## 2020-01-06 LAB
ALBUMIN SERPL ELPH-MCNC: 1.6 G/DL — LOW (ref 3.3–5)
ALBUMIN SERPL ELPH-MCNC: 1.7 G/DL — LOW (ref 3.3–5)
ALP SERPL-CCNC: 91 U/L — SIGNIFICANT CHANGE UP (ref 40–120)
ALP SERPL-CCNC: 97 U/L — SIGNIFICANT CHANGE UP (ref 40–120)
ALT FLD-CCNC: 55 U/L — HIGH (ref 10–45)
ALT FLD-CCNC: 58 U/L — HIGH (ref 10–45)
ANION GAP SERPL CALC-SCNC: 11 MMOL/L — SIGNIFICANT CHANGE UP (ref 5–17)
ANION GAP SERPL CALC-SCNC: 12 MMOL/L — SIGNIFICANT CHANGE UP (ref 5–17)
AST SERPL-CCNC: 59 U/L — HIGH (ref 10–40)
AST SERPL-CCNC: 60 U/L — HIGH (ref 10–40)
BASE EXCESS BLDV CALC-SCNC: -3.1 MMOL/L — SIGNIFICANT CHANGE UP
BILIRUB SERPL-MCNC: 0.9 MG/DL — SIGNIFICANT CHANGE UP (ref 0.2–1.2)
BILIRUB SERPL-MCNC: 0.9 MG/DL — SIGNIFICANT CHANGE UP (ref 0.2–1.2)
BUN SERPL-MCNC: 63 MG/DL — HIGH (ref 7–23)
BUN SERPL-MCNC: 63 MG/DL — HIGH (ref 7–23)
CALCIUM SERPL-MCNC: 8.2 MG/DL — LOW (ref 8.4–10.5)
CALCIUM SERPL-MCNC: 8.4 MG/DL — SIGNIFICANT CHANGE UP (ref 8.4–10.5)
CHLORIDE SERPL-SCNC: 115 MMOL/L — HIGH (ref 96–108)
CHLORIDE SERPL-SCNC: 119 MMOL/L — HIGH (ref 96–108)
CO2 SERPL-SCNC: 19 MMOL/L — LOW (ref 22–31)
CO2 SERPL-SCNC: 19 MMOL/L — LOW (ref 22–31)
CREAT SERPL-MCNC: 1.96 MG/DL — HIGH (ref 0.5–1.3)
CREAT SERPL-MCNC: 2.01 MG/DL — HIGH (ref 0.5–1.3)
CULTURE RESULTS: NO GROWTH — SIGNIFICANT CHANGE UP
GLUCOSE BLDC GLUCOMTR-MCNC: 186 MG/DL — HIGH (ref 70–99)
GLUCOSE BLDC GLUCOMTR-MCNC: 200 MG/DL — HIGH (ref 70–99)
GLUCOSE BLDC GLUCOMTR-MCNC: 212 MG/DL — HIGH (ref 70–99)
GLUCOSE BLDC GLUCOMTR-MCNC: 225 MG/DL — HIGH (ref 70–99)
GLUCOSE BLDC GLUCOMTR-MCNC: 227 MG/DL — HIGH (ref 70–99)
GLUCOSE SERPL-MCNC: 216 MG/DL — HIGH (ref 70–99)
GLUCOSE SERPL-MCNC: 220 MG/DL — HIGH (ref 70–99)
HBA1C BLD-MCNC: 7 % — HIGH (ref 4–5.6)
HCO3 BLDV-SCNC: 21 MMOL/L — SIGNIFICANT CHANGE UP (ref 20–27)
HCT VFR BLD CALC: 27.5 % — LOW (ref 39–50)
HGB BLD-MCNC: 8.7 G/DL — LOW (ref 13–17)
LACTATE SERPL-SCNC: 1.3 MMOL/L — SIGNIFICANT CHANGE UP (ref 0.5–2)
LACTATE SERPL-SCNC: 1.6 MMOL/L — SIGNIFICANT CHANGE UP (ref 0.5–2)
LEGIONELLA AG UR QL: NEGATIVE — SIGNIFICANT CHANGE UP
MAGNESIUM SERPL-MCNC: 2.1 MG/DL — SIGNIFICANT CHANGE UP (ref 1.6–2.6)
MCHC RBC-ENTMCNC: 26.4 PG — LOW (ref 27–34)
MCHC RBC-ENTMCNC: 31.6 GM/DL — LOW (ref 32–36)
MCV RBC AUTO: 83.3 FL — SIGNIFICANT CHANGE UP (ref 80–100)
NRBC # BLD: 0 /100 WBCS — SIGNIFICANT CHANGE UP (ref 0–0)
PCO2 BLDV: 33 MMHG — LOW (ref 41–51)
PH BLDV: 7.42 — SIGNIFICANT CHANGE UP (ref 7.32–7.43)
PHOSPHATE SERPL-MCNC: 2.8 MG/DL — SIGNIFICANT CHANGE UP (ref 2.5–4.5)
PLATELET # BLD AUTO: 210 K/UL — SIGNIFICANT CHANGE UP (ref 150–400)
PO2 BLDV: 46 MMHG — SIGNIFICANT CHANGE UP
POTASSIUM SERPL-MCNC: 4.2 MMOL/L — SIGNIFICANT CHANGE UP (ref 3.5–5.3)
POTASSIUM SERPL-MCNC: 4.3 MMOL/L — SIGNIFICANT CHANGE UP (ref 3.5–5.3)
POTASSIUM SERPL-SCNC: 4.2 MMOL/L — SIGNIFICANT CHANGE UP (ref 3.5–5.3)
POTASSIUM SERPL-SCNC: 4.3 MMOL/L — SIGNIFICANT CHANGE UP (ref 3.5–5.3)
PROT SERPL-MCNC: 5.1 G/DL — LOW (ref 6–8.3)
PROT SERPL-MCNC: 5.1 G/DL — LOW (ref 6–8.3)
RBC # BLD: 3.3 M/UL — LOW (ref 4.2–5.8)
RBC # FLD: 17.4 % — HIGH (ref 10.3–14.5)
SAO2 % BLDV: 77 % — SIGNIFICANT CHANGE UP
SODIUM SERPL-SCNC: 146 MMOL/L — HIGH (ref 135–145)
SODIUM SERPL-SCNC: 149 MMOL/L — HIGH (ref 135–145)
SPECIMEN SOURCE: SIGNIFICANT CHANGE UP
WBC # BLD: 24.1 K/UL — HIGH (ref 3.8–10.5)
WBC # FLD AUTO: 24.1 K/UL — HIGH (ref 3.8–10.5)

## 2020-01-06 PROCEDURE — 71045 X-RAY EXAM CHEST 1 VIEW: CPT | Mod: 26

## 2020-01-06 RX ORDER — LEVOTHYROXINE SODIUM 125 MCG
90 TABLET ORAL
Refills: 0 | Status: DISCONTINUED | OUTPATIENT
Start: 2020-01-06 | End: 2020-01-13

## 2020-01-06 RX ORDER — SODIUM CHLORIDE 9 MG/ML
500 INJECTION INTRAMUSCULAR; INTRAVENOUS; SUBCUTANEOUS ONCE
Refills: 0 | Status: COMPLETED | OUTPATIENT
Start: 2020-01-06 | End: 2020-01-06

## 2020-01-06 RX ORDER — SODIUM CHLORIDE 9 MG/ML
1000 INJECTION, SOLUTION INTRAVENOUS
Refills: 0 | Status: DISCONTINUED | OUTPATIENT
Start: 2020-01-06 | End: 2020-01-08

## 2020-01-06 RX ADMIN — Medication 1: at 12:09

## 2020-01-06 RX ADMIN — Medication 2: at 22:18

## 2020-01-06 RX ADMIN — SODIUM CHLORIDE 75 MILLILITER(S): 9 INJECTION, SOLUTION INTRAVENOUS at 15:31

## 2020-01-06 RX ADMIN — INSULIN GLARGINE 10 UNIT(S): 100 INJECTION, SOLUTION SUBCUTANEOUS at 22:18

## 2020-01-06 RX ADMIN — SODIUM CHLORIDE 500 MILLILITER(S): 9 INJECTION INTRAMUSCULAR; INTRAVENOUS; SUBCUTANEOUS at 01:55

## 2020-01-06 RX ADMIN — Medication 2: at 17:49

## 2020-01-06 RX ADMIN — AMPICILLIN SODIUM AND SULBACTAM SODIUM 200 GRAM(S): 250; 125 INJECTION, POWDER, FOR SUSPENSION INTRAMUSCULAR; INTRAVENOUS at 18:16

## 2020-01-06 RX ADMIN — Medication 90 MICROGRAM(S): at 08:23

## 2020-01-06 RX ADMIN — Medication 1: at 08:42

## 2020-01-06 RX ADMIN — AMPICILLIN SODIUM AND SULBACTAM SODIUM 200 GRAM(S): 250; 125 INJECTION, POWDER, FOR SUSPENSION INTRAMUSCULAR; INTRAVENOUS at 06:15

## 2020-01-06 NOTE — DIETITIAN INITIAL EVALUATION ADULT. - OTHER INFO
89y/o male with history of Dm,HTN,HLD,PVD admitted with generalized weakness/coughing up food and aspiration pneumonia and sepsis .Placed on bipap. SLP unable to assess at this time. No N/V/D/C or pain reported. Noted unstageable ulcers/PU on toes and ankles .Family expressed interest in having a PEG should oral po not be possible. Due to advanced age and aspiration pneumonia patient would benefit from palliative consult to determine goals of care.

## 2020-01-06 NOTE — PROGRESS NOTE ADULT - PROBLEM SELECTOR PLAN 8
- history of diabetes  - holding home doses of januvia 50mg and nateglinide 60mg   - presenting with glucose of 413, given insulin 4mg IVP in the ED  - no ketones on the UA  - BHB 0.2  - started on lantus 10 tonight  - ISS hx DM2. holding home doses of januvia 50mg and nateglinide 60mg   - ISS  -c/w lantus 10 U  -on D5+1/2 NS 75cc/hr for nutrition while NPO  -monitor sugars

## 2020-01-06 NOTE — PROGRESS NOTE ADULT - PROBLEM SELECTOR PLAN 1
- Labs showing severely elevated WBC, elevated lactate, and CXR with showing moderate right effusion concerning for pneumonia  - Sepsis (leukocytosis, fever, elevated lactate), with source 2/2 to PNA likely aspiration in the setting of cough and difficulty swallowing food for 1 month  In the ED patient receive acetaminophen 650mg, vancomycin 1g, zosyn 3.375g, 1L NS bolus  - started unasyn 3g q12 hours  - Hold home anti hypertensives at home  - f/u blood cx  - f/u urine cx  - f/u sputum cx  - f/u urine legionella  - trend lactate Admission labs showing severely elevated WBC, elevated lactate, and CXR with showing moderate right effusion concerning for pneumonia. Sepsis (leukocytosis, fever, elevated lactate), with source 2/2 to PNA likely aspiration in the setting of cough and difficulty swallowing food for 1 month. In the ED patient receive acetaminophen 650mg, vancomycin 1g, zosyn 3.375g, 1L NS bolus  - resolved    #Hypotension  pt w/ o/n bp 70's/40's given 1.5 fluids, unclear why pt bp dropped as had been hypertensive during admission. No signs of bleeding. Bp now stable 90's/50's, pt asymptomatic  -monitor and resuscitate w/ fluids but watch out for pulmonary edema, although no hx of chf

## 2020-01-06 NOTE — CONSULT NOTE ADULT - SUBJECTIVE AND OBJECTIVE BOX
HPI:  89M PMH DM, HTN, HLD, surgical hx of uncertain vascular procedure in left leg (presumed arthrectomy or angioplasty) presents with generalized weakness. The patient is not able to speak when questioned during the encounter and the daughter at bedside is able to provide the history. As per the daughter, the patient was noted to be diagnosed with pneumonia 2 months ago and was treated with antibiotics as an outpatient. Since then, the patient has been having a progressive productive cough with yellow sputum. He has been having difficulty eating in which she notes that he coughs up his food upon swallowing. This past month he has been feeling progressively weaker and today had trouble moving from the car to the wheelchair which prompted his ER visit. Of note, the daughter notes that 3 months ago, the patient had a stent recently placed in the left leg for peripheral vascular disease. The patient is not able to give a history given his mental status and review of systems cannot be assessed.    ED Course: Vitals: T 100.6F HR 83 BP 97/53 RR 18 O2 96% 1L NC  Chest xray: Right effusion. Small left effusion cannot be excluded.   Labs: Wbc 36.83 Hb; 11.8 Plt 262 Cl 110 CO2 19 BUN/Cr 61/1.96 Glucose 413 Lactate 3.3 elevated alk phos and LFTs, trops 0.23 UA neg, RSV neg  Given Vanc 1g, zosyn 3.375g, 1L NS Bolus, acetaminophen 650mg suppository, insulin regular 4mg IVP, blood and urine cultures drawn (05 Jan 2020 17:25)      PAST MEDICAL & SURGICAL HISTORY:  PVD (peripheral vascular disease)  CAD (coronary artery disease)  Hyperlipidemia  Hypertension  Diabetes  PVD (peripheral vascular disease): s/p stent placement in left leg 3 months ago      REVIEW OF SYSTEMS      General:	    Skin/Breast:  	  Ophthalmologic:  	  ENMT:	    Respiratory and Thorax:  	  Cardiovascular:	    Gastrointestinal:	    Genitourinary:	    Musculoskeletal:	    Neurological:	    Psychiatric:	    Hematology/Lymphatics:	    Endocrine:	    Allergic/Immunologic:	    MEDICATIONS  (STANDING):  ampicillin/sulbactam  IVPB 3 Gram(s) IV Intermittent every 12 hours  clopidogrel Tablet 75 milliGRAM(s) Oral daily  dextrose 5%. 1000 milliLiter(s) (50 mL/Hr) IV Continuous <Continuous>  dextrose 50% Injectable 12.5 Gram(s) IV Push once  dextrose 50% Injectable 25 Gram(s) IV Push once  dextrose 50% Injectable 25 Gram(s) IV Push once  insulin glargine Injectable (LANTUS) 10 Unit(s) SubCutaneous at bedtime  insulin lispro (HumaLOG) corrective regimen sliding scale   SubCutaneous Before meals and at bedtime  levothyroxine Injectable 90 MICROGram(s) IV Push <User Schedule>  povidone iodine 10% Solution 1 Application(s) Topical once  simvastatin 20 milliGRAM(s) Oral at bedtime    MEDICATIONS  (PRN):  dextrose 40% Gel 15 Gram(s) Oral once PRN Blood Glucose LESS THAN 70 milliGRAM(s)/deciliter  glucagon  Injectable 1 milliGRAM(s) IntraMuscular once PRN Glucose LESS THAN 70 milligrams/deciliter      Allergies    No Known Allergies    Intolerances        SOCIAL HISTORY:    FAMILY HISTORY:      Vital Signs Last 24 Hrs  T(C): 36.7 (06 Jan 2020 04:00), Max: 38.1 (05 Jan 2020 16:41)  T(F): 98.1 (06 Jan 2020 04:00), Max: 100.6 (05 Jan 2020 16:41)  HR: 79 (06 Jan 2020 09:04) (65 - 83)  BP: 91/59 (06 Jan 2020 09:04) (85/43 - 149/80)  BP(mean): --  RR: 26 (06 Jan 2020 04:45) (17 - 31)  SpO2: 95% (06 Jan 2020 04:45) (92% - 96%)    LABS:                        8.7    24.10 )-----------( 210      ( 06 Jan 2020 05:48 )             27.5     01-06    149<H>  |  119<H>  |  63<H>  ----------------------------<  220<H>  4.2   |  19<L>  |  2.01<H>    Ca    8.4      06 Jan 2020 05:48  Phos  2.8     01-06  Mg     2.1     01-06    TPro  5.1<L>  /  Alb  1.6<L>  /  TBili  0.9  /  DBili  x   /  AST  59<H>  /  ALT  58<H>  /  AlkPhos  97  01-06    PT/INR - ( 05 Jan 2020 12:37 )   PT: 14.4 sec;   INR: 1.25          PTT - ( 05 Jan 2020 12:37 )  PTT:26.6 sec  Urinalysis Basic - ( 05 Jan 2020 13:11 )    Color: Yellow / Appearance: Clear / SG: >=1.030 / pH: x  Gluc: x / Ketone: NEGATIVE  / Bili: Negative / Urobili: 0.2 E.U./dL   Blood: x / Protein: NEGATIVE mg/dL / Nitrite: NEGATIVE   Leuk Esterase: NEGATIVE / RBC: x / WBC x   Sq Epi: x / Non Sq Epi: x / Bacteria: x    Culture - Urine (01.05.20 @ 14:42)    Specimen Source: .Urine Clean Catch (Midstream)    Culture Results:   No growth    Culture - Blood (01.05.20 @ 13:10)    Specimen Source: .Blood Blood    Culture Results:   No growth at 12 hours        RADIOLOGY & ADDITIONAL STUDIES:    Xray Chest 1 View- PORTABLE-Urgent (01.05.20 @ 12:50) >    EXAM:  XR CHEST PORTABLE URGENT 1V                          PROCEDURE DATE:  01/05/2020          INTERPRETATION:  Clinical History: Weakness    Frontal examination of the chest demonstrates the heart to be within normal limits in transverse diameter. Congestion and/or infiltrates. Right effusion. Small effusion cannot be excluded. Degenerative changes thoracic spine. Calcification aortic knob.    Impression: Congestion and/or infiltrates. Right effusion. Small left effusion cannot be excluded HPI:  89M PMH DM, HTN, HLD, surgical hx of uncertain vascular procedure in left leg (presumed arthrectomy or angioplasty) presents with generalized weakness. The patient is not able to speak when questioned during the encounter and the daughter at bedside is able to provide the history. As per the daughter, the patient was noted to be diagnosed with pneumonia 2 months ago and was treated with antibiotics as an outpatient. Since then, the patient has been having a progressive productive cough with yellow sputum. He has been having difficulty eating in which she notes that he coughs up his food upon swallowing. This past month he has been feeling progressively weaker and today had trouble moving from the car to the wheelchair which prompted his ER visit. Of note, the daughter notes that 3 months ago, the patient had a stent recently placed in the left leg for peripheral vascular disease. The patient is not able to give a history given his mental status and review of systems cannot be assessed.    ED Course: Vitals: T 100.6F HR 83 BP 97/53 RR 18 O2 96% 1L NC  Chest xray: Right effusion. Small left effusion cannot be excluded.   Labs: Wbc 36.83 Hb; 11.8 Plt 262 Cl 110 CO2 19 BUN/Cr 61/1.96 Glucose 413 Lactate 3.3 elevated alk phos and LFTs, trops 0.23 UA neg, RSV neg  Given Vanc 1g, zosyn 3.375g, 1L NS Bolus, acetaminophen 650mg suppository, insulin regular 4mg IVP, blood and urine cultures drawn (05 Jan 2020 17:25)      PAST MEDICAL & SURGICAL HISTORY:  PVD (peripheral vascular disease)  CAD (coronary artery disease)  Hyperlipidemia  Hypertension  Diabetes  PVD (peripheral vascular disease): s/p stent placement in left leg 3 months ago      REVIEW OF SYSTEMS      MEDICATIONS  (STANDING):  ampicillin/sulbactam  IVPB 3 Gram(s) IV Intermittent every 12 hours  clopidogrel Tablet 75 milliGRAM(s) Oral daily  dextrose 5%. 1000 milliLiter(s) (50 mL/Hr) IV Continuous <Continuous>  dextrose 50% Injectable 12.5 Gram(s) IV Push once  dextrose 50% Injectable 25 Gram(s) IV Push once  dextrose 50% Injectable 25 Gram(s) IV Push once  insulin glargine Injectable (LANTUS) 10 Unit(s) SubCutaneous at bedtime  insulin lispro (HumaLOG) corrective regimen sliding scale   SubCutaneous Before meals and at bedtime  levothyroxine Injectable 90 MICROGram(s) IV Push <User Schedule>  povidone iodine 10% Solution 1 Application(s) Topical once  simvastatin 20 milliGRAM(s) Oral at bedtime    MEDICATIONS  (PRN):  dextrose 40% Gel 15 Gram(s) Oral once PRN Blood Glucose LESS THAN 70 milliGRAM(s)/deciliter  glucagon  Injectable 1 milliGRAM(s) IntraMuscular once PRN Glucose LESS THAN 70 milligrams/deciliter      Allergies    No Known Allergies    Intolerances        SOCIAL HISTORY:    FAMILY HISTORY:      Vital Signs Last 24 Hrs  T(C): 36.7 (06 Jan 2020 04:00), Max: 38.1 (05 Jan 2020 16:41)  T(F): 98.1 (06 Jan 2020 04:00), Max: 100.6 (05 Jan 2020 16:41)  HR: 79 (06 Jan 2020 09:04) (65 - 83)  BP: 91/59 (06 Jan 2020 09:04) (85/43 - 149/80)  BP(mean): --  RR: 26 (06 Jan 2020 04:45) (17 - 31)  SpO2: 95% (06 Jan 2020 04:45) (92% - 96%)    LABS:                        8.7    24.10 )-----------( 210      ( 06 Jan 2020 05:48 )             27.5     01-06    149<H>  |  119<H>  |  63<H>  ----------------------------<  220<H>  4.2   |  19<L>  |  2.01<H>    Ca    8.4      06 Jan 2020 05:48  Phos  2.8     01-06  Mg     2.1     01-06    TPro  5.1<L>  /  Alb  1.6<L>  /  TBili  0.9  /  DBili  x   /  AST  59<H>  /  ALT  58<H>  /  AlkPhos  97  01-06    PT/INR - ( 05 Jan 2020 12:37 )   PT: 14.4 sec;   INR: 1.25          PTT - ( 05 Jan 2020 12:37 )  PTT:26.6 sec  Urinalysis Basic - ( 05 Jan 2020 13:11 )    Color: Yellow / Appearance: Clear / SG: >=1.030 / pH: x  Gluc: x / Ketone: NEGATIVE  / Bili: Negative / Urobili: 0.2 E.U./dL   Blood: x / Protein: NEGATIVE mg/dL / Nitrite: NEGATIVE   Leuk Esterase: NEGATIVE / RBC: x / WBC x   Sq Epi: x / Non Sq Epi: x / Bacteria: x    Culture - Urine (01.05.20 @ 14:42)    Specimen Source: .Urine Clean Catch (Midstream)    Culture Results:   No growth    Culture - Blood (01.05.20 @ 13:10)    Specimen Source: .Blood Blood    Culture Results:   No growth at 12 hours        RADIOLOGY & ADDITIONAL STUDIES:    Xray Chest 1 View- PORTABLE-Urgent (01.05.20 @ 12:50) >    EXAM:  XR CHEST PORTABLE URGENT 1V                          PROCEDURE DATE:  01/05/2020          INTERPRETATION:  Clinical History: Weakness    Frontal examination of the chest demonstrates the heart to be within normal limits in transverse diameter. Congestion and/or infiltrates. Right effusion. Small effusion cannot be excluded. Degenerative changes thoracic spine. Calcification aortic knob.    Impression: Congestion and/or infiltrates. Right effusion. Small left effusion cannot be excluded HPI:  89M PMH DM, HTN, HLD, surgical hx of uncertain vascular procedure in left leg (presumed arthrectomy or angioplasty) presents with generalized weakness. The patient is not able to speak when questioned during the encounter and the daughter at bedside is able to provide the history. As per the daughter, the patient was noted to be diagnosed with pneumonia 2 months ago and was treated with antibiotics as an outpatient. Since then, the patient has been having a progressive productive cough with yellow sputum. He has been having difficulty eating in which she notes that he coughs up his food upon swallowing. This past month he has been feeling progressively weaker and today had trouble moving from the car to the wheelchair which prompted his ER visit. Of note, the daughter notes that 3 months ago, the patient had a stent recently placed in the left leg for peripheral vascular disease. The patient is not able to give a history given his mental status and review of systems cannot be assessed.    ED Course: Vitals: T 100.6F HR 83 BP 97/53 RR 18 O2 96% 1L NC  Chest xray: Right effusion. Small left effusion cannot be excluded.   Labs: Wbc 36.83 Hb; 11.8 Plt 262 Cl 110 CO2 19 BUN/Cr 61/1.96 Glucose 413 Lactate 3.3 elevated alk phos and LFTs, trops 0.23 UA neg, RSV neg  Given Vanc 1g, zosyn 3.375g, 1L NS Bolus, acetaminophen 650mg suppository, insulin regular 4mg IVP, blood and urine cultures drawn (05 Jan 2020 17:25)      PAST MEDICAL & SURGICAL HISTORY:  PVD (peripheral vascular disease)  CAD (coronary artery disease)  Hyperlipidemia  Hypertension  Diabetes  PVD (peripheral vascular disease): s/p stent placement in left leg 3 months ago      REVIEW OF SYSTEMS  No fever  No diarrhea  Decub ulcers  Otherwise negative    MEDICATIONS  (STANDING):  ampicillin/sulbactam  IVPB 3 Gram(s) IV Intermittent every 12 hours  clopidogrel Tablet 75 milliGRAM(s) Oral daily  dextrose 5%. 1000 milliLiter(s) (50 mL/Hr) IV Continuous <Continuous>  dextrose 50% Injectable 12.5 Gram(s) IV Push once  dextrose 50% Injectable 25 Gram(s) IV Push once  dextrose 50% Injectable 25 Gram(s) IV Push once  insulin glargine Injectable (LANTUS) 10 Unit(s) SubCutaneous at bedtime  insulin lispro (HumaLOG) corrective regimen sliding scale   SubCutaneous Before meals and at bedtime  levothyroxine Injectable 90 MICROGram(s) IV Push <User Schedule>  povidone iodine 10% Solution 1 Application(s) Topical once  simvastatin 20 milliGRAM(s) Oral at bedtime    MEDICATIONS  (PRN):  dextrose 40% Gel 15 Gram(s) Oral once PRN Blood Glucose LESS THAN 70 milliGRAM(s)/deciliter  glucagon  Injectable 1 milliGRAM(s) IntraMuscular once PRN Glucose LESS THAN 70 milligrams/deciliter      Allergies    No Known Allergies    SOCIAL HISTORY:  Bedbound   Living at home with family      FAMILY HISTORY:  Noncontributory    Vital Signs Last 24 Hrs  T(C): 36.7 (06 Jan 2020 04:00), Max: 38.1 (05 Jan 2020 16:41)  T(F): 98.1 (06 Jan 2020 04:00), Max: 100.6 (05 Jan 2020 16:41)  HR: 79 (06 Jan 2020 09:04) (65 - 83)  BP: 91/59 (06 Jan 2020 09:04) (85/43 - 149/80)  BP(mean): --  RR: 26 (06 Jan 2020 04:45) (17 - 31)  SpO2: 95% (06 Jan 2020 04:45) (92% - 96%)  Awakens and tries to respond  Communicates with daughter   On high O2 / BIPAP  Pale  No rash  RR  Chest with bilateral rhonchi and decreased BSs at bases  Abd soft NT  LEs contracted  Feet decubs  No cellulitis        LABS:                        8.7    24.10 )-----------( 210      ( 06 Jan 2020 05:48 )             27.5     01-06    149<H>  |  119<H>  |  63<H>  ----------------------------<  220<H>  4.2   |  19<L>  |  2.01<H>    Ca    8.4      06 Jan 2020 05:48  Phos  2.8     01-06  Mg     2.1     01-06    TPro  5.1<L>  /  Alb  1.6<L>  /  TBili  0.9  /  DBili  x   /  AST  59<H>  /  ALT  58<H>  /  AlkPhos  97  01-06    PT/INR - ( 05 Jan 2020 12:37 )   PT: 14.4 sec;   INR: 1.25          PTT - ( 05 Jan 2020 12:37 )  PTT:26.6 sec  Urinalysis Basic - ( 05 Jan 2020 13:11 )    Color: Yellow / Appearance: Clear / SG: >=1.030 / pH: x  Gluc: x / Ketone: NEGATIVE  / Bili: Negative / Urobili: 0.2 E.U./dL   Blood: x / Protein: NEGATIVE mg/dL / Nitrite: NEGATIVE   Leuk Esterase: NEGATIVE / RBC: x / WBC x   Sq Epi: x / Non Sq Epi: x / Bacteria: x    Culture - Urine (01.05.20 @ 14:42)    Specimen Source: .Urine Clean Catch (Midstream)    Culture Results:   No growth    Culture - Blood (01.05.20 @ 13:10)    Specimen Source: .Blood Blood    Culture Results:   No growth at 12 hours        RADIOLOGY & ADDITIONAL STUDIES:    Xray Chest 1 View- PORTABLE-Urgent (01.05.20 @ 12:50) >    EXAM:  XR CHEST PORTABLE URGENT 1V                          PROCEDURE DATE:  01/05/2020          INTERPRETATION:  Clinical History: Weakness    Frontal examination of the chest demonstrates the heart to be within normal limits in transverse diameter. Congestion and/or infiltrates. Right effusion. Small effusion cannot be excluded. Degenerative changes thoracic spine. Calcification aortic knob.    Impression: Congestion and/or infiltrates. Right effusion. Small left effusion cannot be excluded

## 2020-01-06 NOTE — DIETITIAN INITIAL EVALUATION ADULT. - ADD RECOMMEND
1/Pending SLP and po advancement potential 2.Would consider palliative to determine GOC 3.Align nutrition with goals of care at times

## 2020-01-06 NOTE — DIETITIAN INITIAL EVALUATION ADULT. - PROBLEM SELECTOR PLAN 8
- history of diabetes  - holding home doses of januvia 50mg and nateglinide 60mg   - presenting with glucose of 413, given insulin 4mg IVP in the ED  - no ketones on the UA  - BHB 0.2  - started on lantus 10 tonight  - ISS

## 2020-01-06 NOTE — SWALLOW BEDSIDE ASSESSMENT ADULT - SLP GENERAL OBSERVATIONS
Pt is lethargic, arousable to daughter's voice [facility]

## 2020-01-06 NOTE — CONSULT NOTE ADULT - ASSESSMENT
Acute respiratory failure  Pneumonia  Suspect aspiration Acute respiratory failure  Pneumonia  Suspect aspiration      RECOMMEND  As patient improving on Amp-Sulbactam, OK to continue  Swallowing evaluation  If not improving, would obtain chest CT if within goals of care Acute respiratory failure  Pneumonia  Suspect aspiration      RECOMMEND  As patient improving on Amp-Sulbactam, OK to continue it  Swallowing evaluation  If not improving, would obtain chest CT if within goals of care  Wounf care consult for decsantiago

## 2020-01-06 NOTE — PROGRESS NOTE ADULT - SUBJECTIVE AND OBJECTIVE BOX
OVERNIGHT EVENTS: Pt hypotensive 70's/40's, given 1.5L total as bp did not responded, pt then tachypneic 30's, placed on bibap    SUBJECTIVE / INTERVAL HPI: Patient seen and examined at bedside.     VITAL SIGNS:  Vital Signs Last 24 Hrs  T(C): 37.2 (06 Jan 2020 13:32), Max: 38.1 (05 Jan 2020 16:41)  T(F): 98.9 (06 Jan 2020 13:32), Max: 100.6 (05 Jan 2020 16:41)  HR: 83 (06 Jan 2020 13:32) (68 - 83)  BP: 95/59 (06 Jan 2020 13:32) (85/43 - 149/80)  BP(mean): --  RR: 18 (06 Jan 2020 13:32) (17 - 31)  SpO2: 94% (06 Jan 2020 13:32) (92% - 95%)    PHYSICAL EXAM:    General: WDWN  HEENT: NC/AT; PERRL, anicteric sclera; MMM  Neck: supple  Cardiovascular: +S1/S2; RRR  Respiratory: CTA B/L; no W/R/R  Gastrointestinal: soft, NT/ND; +BSx4  Extremities: WWP; no edema, clubbing or cyanosis  Vascular: 2+ radial, DP/PT pulses B/L  Neurological: AAOx3; no focal deficits    MEDICATIONS:  MEDICATIONS  (STANDING):  ampicillin/sulbactam  IVPB 3 Gram(s) IV Intermittent every 12 hours  clopidogrel Tablet 75 milliGRAM(s) Oral daily  dextrose 5% + sodium chloride 0.45%. 1000 milliLiter(s) (75 mL/Hr) IV Continuous <Continuous>  dextrose 5%. 1000 milliLiter(s) (50 mL/Hr) IV Continuous <Continuous>  dextrose 50% Injectable 12.5 Gram(s) IV Push once  dextrose 50% Injectable 25 Gram(s) IV Push once  dextrose 50% Injectable 25 Gram(s) IV Push once  insulin glargine Injectable (LANTUS) 10 Unit(s) SubCutaneous at bedtime  insulin lispro (HumaLOG) corrective regimen sliding scale   SubCutaneous Before meals and at bedtime  levothyroxine Injectable 90 MICROGram(s) IV Push <User Schedule>  povidone iodine 10% Solution 1 Application(s) Topical once  simvastatin 20 milliGRAM(s) Oral at bedtime    MEDICATIONS  (PRN):  dextrose 40% Gel 15 Gram(s) Oral once PRN Blood Glucose LESS THAN 70 milliGRAM(s)/deciliter  glucagon  Injectable 1 milliGRAM(s) IntraMuscular once PRN Glucose LESS THAN 70 milligrams/deciliter      ALLERGIES:  Allergies    No Known Allergies    Intolerances        LABS:                        8.7    24.10 )-----------( 210      ( 06 Jan 2020 05:48 )             27.5     01-06    149<H>  |  119<H>  |  63<H>  ----------------------------<  220<H>  4.2   |  19<L>  |  2.01<H>    Ca    8.4      06 Jan 2020 05:48  Phos  2.8     01-06  Mg     2.1     01-06    TPro  5.1<L>  /  Alb  1.6<L>  /  TBili  0.9  /  DBili  x   /  AST  59<H>  /  ALT  58<H>  /  AlkPhos  97  01-06    PT/INR - ( 05 Jan 2020 12:37 )   PT: 14.4 sec;   INR: 1.25          PTT - ( 05 Jan 2020 12:37 )  PTT:26.6 sec  Urinalysis Basic - ( 05 Jan 2020 13:11 )    Color: Yellow / Appearance: Clear / SG: >=1.030 / pH: x  Gluc: x / Ketone: NEGATIVE  / Bili: Negative / Urobili: 0.2 E.U./dL   Blood: x / Protein: NEGATIVE mg/dL / Nitrite: NEGATIVE   Leuk Esterase: NEGATIVE / RBC: x / WBC x   Sq Epi: x / Non Sq Epi: x / Bacteria: x      CAPILLARY BLOOD GLUCOSE      POCT Blood Glucose.: 186 mg/dL (06 Jan 2020 11:50)      RADIOLOGY & ADDITIONAL TESTS: Reviewed.    ASSESSMENT:    PLAN: OVERNIGHT EVENTS: Pt hypotensive 70's/40's, given 1.5L total as bp did not responded, pt then tachypneic 30's, placed on bibap    SUBJECTIVE / INTERVAL HPI: Patient seen and examined at bedside. Pt initially lethargic, but more arousable when daughter present. Indicated he did not want the bipap and made him uncomfortably.     VITAL SIGNS:  Vital Signs Last 24 Hrs  T(C): 37.2 (06 Jan 2020 13:32), Max: 38.1 (05 Jan 2020 16:41)  T(F): 98.9 (06 Jan 2020 13:32), Max: 100.6 (05 Jan 2020 16:41)  HR: 83 (06 Jan 2020 13:32) (68 - 83)  BP: 95/59 (06 Jan 2020 13:32) (85/43 - 149/80)  BP(mean): --  RR: 18 (06 Jan 2020 13:32) (17 - 31)  SpO2: 94% (06 Jan 2020 13:32) (92% - 95%)    PHYSICAL EXAM:    General: elderly, cachexic, on bipap, responsive to stimuli and command  HEENT: NC/AT; PERRL, anicteric sclera; dry mucous membrane  Neck: supple  Cardiovascular: +S1/S2; RRR  Respiratory: b/l rhochi  Gastrointestinal: soft, NT/ND; +BSx4  Extremities: no leg edema, 5/5 muscle strength hands  Vascular: 2+ radial,   Neurological: no focal deficits    MEDICATIONS:  MEDICATIONS  (STANDING):  ampicillin/sulbactam  IVPB 3 Gram(s) IV Intermittent every 12 hours  clopidogrel Tablet 75 milliGRAM(s) Oral daily  dextrose 5% + sodium chloride 0.45%. 1000 milliLiter(s) (75 mL/Hr) IV Continuous <Continuous>  dextrose 5%. 1000 milliLiter(s) (50 mL/Hr) IV Continuous <Continuous>  dextrose 50% Injectable 12.5 Gram(s) IV Push once  dextrose 50% Injectable 25 Gram(s) IV Push once  dextrose 50% Injectable 25 Gram(s) IV Push once  insulin glargine Injectable (LANTUS) 10 Unit(s) SubCutaneous at bedtime  insulin lispro (HumaLOG) corrective regimen sliding scale   SubCutaneous Before meals and at bedtime  levothyroxine Injectable 90 MICROGram(s) IV Push <User Schedule>  povidone iodine 10% Solution 1 Application(s) Topical once  simvastatin 20 milliGRAM(s) Oral at bedtime    MEDICATIONS  (PRN):  dextrose 40% Gel 15 Gram(s) Oral once PRN Blood Glucose LESS THAN 70 milliGRAM(s)/deciliter  glucagon  Injectable 1 milliGRAM(s) IntraMuscular once PRN Glucose LESS THAN 70 milligrams/deciliter      ALLERGIES:  Allergies    No Known Allergies    Intolerances        LABS:                        8.7    24.10 )-----------( 210      ( 06 Jan 2020 05:48 )             27.5     01-06    149<H>  |  119<H>  |  63<H>  ----------------------------<  220<H>  4.2   |  19<L>  |  2.01<H>    Ca    8.4      06 Jan 2020 05:48  Phos  2.8     01-06  Mg     2.1     01-06    TPro  5.1<L>  /  Alb  1.6<L>  /  TBili  0.9  /  DBili  x   /  AST  59<H>  /  ALT  58<H>  /  AlkPhos  97  01-06    PT/INR - ( 05 Jan 2020 12:37 )   PT: 14.4 sec;   INR: 1.25          PTT - ( 05 Jan 2020 12:37 )  PTT:26.6 sec  Urinalysis Basic - ( 05 Jan 2020 13:11 )    Color: Yellow / Appearance: Clear / SG: >=1.030 / pH: x  Gluc: x / Ketone: NEGATIVE  / Bili: Negative / Urobili: 0.2 E.U./dL   Blood: x / Protein: NEGATIVE mg/dL / Nitrite: NEGATIVE   Leuk Esterase: NEGATIVE / RBC: x / WBC x   Sq Epi: x / Non Sq Epi: x / Bacteria: x      CAPILLARY BLOOD GLUCOSE      POCT Blood Glucose.: 186 mg/dL (06 Jan 2020 11:50)      RADIOLOGY & ADDITIONAL TESTS: Reviewed.    ASSESSMENT:    PLAN:

## 2020-01-06 NOTE — PROGRESS NOTE ADULT - PROBLEM SELECTOR PLAN 9
F: s/p 2L NS   E: Replete K<4, Mg<2  N: NPO F: 1/2 NS + D5 75cc/hr  E: Replete K<4, Mg<2  N: NPO  A: Plavix 75mg

## 2020-01-06 NOTE — CHART NOTE - NSCHARTNOTEFT_GEN_A_CORE
1/5/20 ~9:30pm, called about BPs to 60s/30s, given 1L NS bolus with improvement in BP to 90s/50s; HR 77, afebrile 98.3 rectally, 10pm lactate 2.1, another 500cc NS bolus given x1 with no change in BP--still mid 80s-low 90s/40-50s, HR remains in the 70s, still afebrile.     ~4:30am, pt tachypneic to RR 30, but satting 95% on 4L, bipap ordered for patient, BPs still stable mid 80s to low 90s systolic; CXR ordered, looks more fluid overloaded, pt initially presented with a wet pamper, but since fluid boluses, pt not making urine, labs drawn and ABG drawn, labs stable.

## 2020-01-06 NOTE — DIETITIAN INITIAL EVALUATION ADULT. - PROBLEM SELECTOR PLAN 2
Patient presenting with 2 month of productive cough yellow sputum, found to be febrile,   Found to have moderate sized right sided consolidation consisting with pneumonia  - s/p vancomycin 1g, zosyn 3.375g in the ED  - started unasyn 3g q12 hours  - speech and swallow eval  - pulm consult in the AM

## 2020-01-06 NOTE — DIETITIAN INITIAL EVALUATION ADULT. - PROBLEM SELECTOR PLAN 1
- Labs showing severely elevated WBC, elevated lactate, and CXR with showing moderate right effusion concerning for pneumonia  - Sepsis (leukocytosis, fever, elevated lactate), with source 2/2 to PNA likely aspiration in the setting of cough and difficulty swallowing food for 1 month  In the ED patient receive acetaminophen 650mg, vancomycin 1g, zosyn 3.375g, 1L NS bolus  - started unasyn 3g q12 hours  - Hold home anti hypertensives at home  - f/u blood cx  - f/u urine cx  - f/u sputum cx  - f/u urine legionella  - trend lactate

## 2020-01-06 NOTE — PROGRESS NOTE ADULT - ASSESSMENT
89M PMH DM, HTN, HLD, surgical hx of uncertain vascular procedure in left leg (presumed arthrectomy or angioplasty) presents with generalized weakness admitted with severe sepsis secondary to aspiration pneumonia.

## 2020-01-06 NOTE — DIETITIAN INITIAL EVALUATION ADULT. - PROBLEM SELECTOR PLAN 3
His 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. Although there are some areas of erythema that could reflect mild cellulitis, his sepsis is likely due to an underlying pulmonary etiology  - vascular surgery consulted  - no surgical intervention required at this time  - wound care: apply betadine to areas of gangrene/necrosis, wrap the feet with kerlex

## 2020-01-06 NOTE — PROGRESS NOTE ADULT - PROBLEM SELECTOR PLAN 3
His 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. Although there are some areas of erythema that could reflect mild cellulitis, his sepsis is likely due to an underlying pulmonary etiology  - vascular surgery consulted  - no surgical intervention required at this time  - wound care: apply betadine to areas of gangrene/necrosis, wrap the feet with kerlex His 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. Although there are some areas of erythema that could reflect mild cellulitis, his sepsis is likely due to an underlying pulmonary etiology  - vascular surgery said no surgical intervention, except dressing changes  - wound care: apply betadine to areas of gangrene/necrosis, wrap the feet with kerlex

## 2020-01-06 NOTE — DIETITIAN INITIAL EVALUATION ADULT. - PROBLEM SELECTOR PLAN 6
history of hyperlipidemia  - continue with home simvastatin 20mg daily    #Hypothyroidism  - history of hypothyroidism   - TSH 1.391  - continue home dose synthroid 125mcg

## 2020-01-06 NOTE — PROGRESS NOTE ADULT - PROBLEM SELECTOR PLAN 2
Patient presenting with 2 month of productive cough yellow sputum, found to be febrile,   Found to have moderate sized right sided consolidation consisting with pneumonia  - s/p vancomycin 1g, zosyn 3.375g in the ED  - started unasyn 3g q12 hours  - speech and swallow eval  - pulm consult in the AM Patient presenting with 2 month of productive cough yellow sputum, found to be febrile, found to have moderate sized right sided consolidation consisting with pneumonia. s/p vancomycin 1g, zosyn 3.375g in the ED  - c/w unasyn 3g q12 hours  - consulted ID  - speech and swallow eval  - pulm consult    #Aspiration  -S+S will evaluate pt  -pt to be NPO during time  -started D5 + 1/2 NS 75cc/hr for maintenance nutrition while NPO

## 2020-01-06 NOTE — DIETITIAN INITIAL EVALUATION ADULT. - PERTINENT LABORATORY DATA
Low H/H:8.7/27.5,Na;149,Bun:63,Creatinine:2.01,Glucose:220,HGBA1C:7,Albumin:1.6.Increased SGOT:59 SGPT:58 FS:200

## 2020-01-06 NOTE — DIETITIAN INITIAL EVALUATION ADULT. - ENERGY NEEDS
Ht:5ft 5inches,IBW:136lbs +/-10%,89% of IBW.BMI:20.2.Adjusted for advanced age/Fluids per team due to increased aspiration risk.NPO at present.Increased protein

## 2020-01-06 NOTE — PATIENT PROFILE ADULT - STATED REASON FOR ADMISSION
As per care provider " coming back from the [place], he couldn't stand and he had vomited all his breakfast, then I call 911"

## 2020-01-06 NOTE — PATIENT PROFILE ADULT - NSPROPRESSUREINJURYOTHER_GEN_A_NUR
Right foot ankle nonblanchable stage I  Left upper arm mass [id number]  Left chest/rib area bruising Stage II sacrum  Right foot ankle nonblanchable stage I  Left upper arm mass [id number]  Left chest/rib area bruising

## 2020-01-06 NOTE — DIETITIAN INITIAL EVALUATION ADULT. - ENTERAL
Should goals of care are for PEG placement recommend: Glucerna 1.2@30cc/hr to initiate and increase by [id number] to goal rate of 48cc/hr providinTV/1382kcal 977cc free water and 73gmprotein

## 2020-01-06 NOTE — DIETITIAN INITIAL EVALUATION ADULT. - PHYSICAL APPEARANCE
debilitated/Deferred complete NFPE at this time due to visiting family.Presently 89% of IBW.Some weight loss reported but family unable to quantify how much.

## 2020-01-07 LAB
ANION GAP SERPL CALC-SCNC: 10 MMOL/L — SIGNIFICANT CHANGE UP (ref 5–17)
APTT BLD: 31 SEC — SIGNIFICANT CHANGE UP (ref 27.5–36.3)
BLD GP AB SCN SERPL QL: NEGATIVE — SIGNIFICANT CHANGE UP
BUN SERPL-MCNC: 66 MG/DL — HIGH (ref 7–23)
CALCIUM SERPL-MCNC: 8.4 MG/DL — SIGNIFICANT CHANGE UP (ref 8.4–10.5)
CHLORIDE SERPL-SCNC: 119 MMOL/L — HIGH (ref 96–108)
CO2 SERPL-SCNC: 19 MMOL/L — LOW (ref 22–31)
CREAT SERPL-MCNC: 1.76 MG/DL — HIGH (ref 0.5–1.3)
FERRITIN SERPL-MCNC: 1165 NG/ML — HIGH (ref 30–400)
GLUCOSE BLDC GLUCOMTR-MCNC: 186 MG/DL — HIGH (ref 70–99)
GLUCOSE BLDC GLUCOMTR-MCNC: 208 MG/DL — HIGH (ref 70–99)
GLUCOSE BLDC GLUCOMTR-MCNC: 217 MG/DL — HIGH (ref 70–99)
GLUCOSE BLDC GLUCOMTR-MCNC: 228 MG/DL — HIGH (ref 70–99)
GLUCOSE SERPL-MCNC: 231 MG/DL — HIGH (ref 70–99)
HCT VFR BLD CALC: 27.7 % — LOW (ref 39–50)
HGB BLD-MCNC: 8.8 G/DL — LOW (ref 13–17)
INR BLD: 1.23 — HIGH (ref 0.88–1.16)
IRON SATN MFR SERPL: 14 UG/DL — LOW (ref 45–165)
IRON SATN MFR SERPL: 15 % — LOW (ref 16–55)
MAGNESIUM SERPL-MCNC: 2.2 MG/DL — SIGNIFICANT CHANGE UP (ref 1.6–2.6)
MCHC RBC-ENTMCNC: 26.3 PG — LOW (ref 27–34)
MCHC RBC-ENTMCNC: 31.8 GM/DL — LOW (ref 32–36)
MCV RBC AUTO: 82.7 FL — SIGNIFICANT CHANGE UP (ref 80–100)
NRBC # BLD: 0 /100 WBCS — SIGNIFICANT CHANGE UP (ref 0–0)
PHOSPHATE SERPL-MCNC: 2.7 MG/DL — SIGNIFICANT CHANGE UP (ref 2.5–4.5)
PLATELET # BLD AUTO: 198 K/UL — SIGNIFICANT CHANGE UP (ref 150–400)
POTASSIUM SERPL-MCNC: 4 MMOL/L — SIGNIFICANT CHANGE UP (ref 3.5–5.3)
POTASSIUM SERPL-SCNC: 4 MMOL/L — SIGNIFICANT CHANGE UP (ref 3.5–5.3)
PROTHROM AB SERPL-ACNC: 14.1 SEC — HIGH (ref 10–12.9)
RBC # BLD: 3.35 M/UL — LOW (ref 4.2–5.8)
RBC # FLD: 17.5 % — HIGH (ref 10.3–14.5)
RETICS #: 40.9 K/UL — SIGNIFICANT CHANGE UP (ref 25–125)
RETICS/RBC NFR: 1.2 % — SIGNIFICANT CHANGE UP (ref 0.5–2.5)
RH IG SCN BLD-IMP: POSITIVE — SIGNIFICANT CHANGE UP
SODIUM SERPL-SCNC: 148 MMOL/L — HIGH (ref 135–145)
TIBC SERPL-MCNC: 95 UG/DL — LOW (ref 220–430)
TRANSFERRIN SERPL-MCNC: 70 MG/DL — LOW (ref 200–360)
UIBC SERPL-MCNC: 81 UG/DL — LOW (ref 110–370)
WBC # BLD: 20.8 K/UL — HIGH (ref 3.8–10.5)
WBC # FLD AUTO: 20.8 K/UL — HIGH (ref 3.8–10.5)

## 2020-01-07 RX ORDER — HEPARIN SODIUM 5000 [USP'U]/ML
5000 INJECTION INTRAVENOUS; SUBCUTANEOUS EVERY 12 HOURS
Refills: 0 | Status: DISCONTINUED | OUTPATIENT
Start: 2020-01-07 | End: 2020-01-13

## 2020-01-07 RX ADMIN — Medication 1: at 12:03

## 2020-01-07 RX ADMIN — INSULIN GLARGINE 10 UNIT(S): 100 INJECTION, SOLUTION SUBCUTANEOUS at 22:20

## 2020-01-07 RX ADMIN — HEPARIN SODIUM 5000 UNIT(S): 5000 INJECTION INTRAVENOUS; SUBCUTANEOUS at 17:14

## 2020-01-07 RX ADMIN — Medication 2: at 08:49

## 2020-01-07 RX ADMIN — SODIUM CHLORIDE 75 MILLILITER(S): 9 INJECTION, SOLUTION INTRAVENOUS at 05:34

## 2020-01-07 RX ADMIN — AMPICILLIN SODIUM AND SULBACTAM SODIUM 200 GRAM(S): 250; 125 INJECTION, POWDER, FOR SUSPENSION INTRAMUSCULAR; INTRAVENOUS at 05:41

## 2020-01-07 RX ADMIN — SODIUM CHLORIDE 75 MILLILITER(S): 9 INJECTION, SOLUTION INTRAVENOUS at 22:24

## 2020-01-07 RX ADMIN — AMPICILLIN SODIUM AND SULBACTAM SODIUM 200 GRAM(S): 250; 125 INJECTION, POWDER, FOR SUSPENSION INTRAMUSCULAR; INTRAVENOUS at 18:45

## 2020-01-07 RX ADMIN — Medication 2: at 17:14

## 2020-01-07 RX ADMIN — Medication 2: at 22:20

## 2020-01-07 NOTE — PROGRESS NOTE ADULT - SUBJECTIVE AND OBJECTIVE BOX
INTERVAL HPI/OVERNIGHT EVENTS:    ANTIBIOTICS/RELEVANT:    MEDICATIONS  (STANDING):  ampicillin/sulbactam  IVPB 3 Gram(s) IV Intermittent every 12 hours  clopidogrel Tablet 75 milliGRAM(s) Oral daily  dextrose 5% + sodium chloride 0.45%. 1000 milliLiter(s) (75 mL/Hr) IV Continuous <Continuous>  dextrose 5%. 1000 milliLiter(s) (50 mL/Hr) IV Continuous <Continuous>  dextrose 50% Injectable 12.5 Gram(s) IV Push once  dextrose 50% Injectable 25 Gram(s) IV Push once  dextrose 50% Injectable 25 Gram(s) IV Push once  heparin  Injectable 5000 Unit(s) SubCutaneous every 12 hours  insulin glargine Injectable (LANTUS) 10 Unit(s) SubCutaneous at bedtime  insulin lispro (HumaLOG) corrective regimen sliding scale   SubCutaneous Before meals and at bedtime  levothyroxine Injectable 90 MICROGram(s) IV Push <User Schedule>  povidone iodine 10% Solution 1 Application(s) Topical once  simvastatin 20 milliGRAM(s) Oral at bedtime    MEDICATIONS  (PRN):  dextrose 40% Gel 15 Gram(s) Oral once PRN Blood Glucose LESS THAN 70 milliGRAM(s)/deciliter  glucagon  Injectable 1 milliGRAM(s) IntraMuscular once PRN Glucose LESS THAN 70 milligrams/deciliter      Allergies    No Known Allergies    Intolerances        Vital Signs Last 24 Hrs  T(C): 37.2 (07 Jan 2020 13:40), Max: 37.2 (06 Jan 2020 20:36)  T(F): 99 (07 Jan 2020 13:40), Max: 99 (07 Jan 2020 13:40)  HR: 68 (07 Jan 2020 13:40) (68 - 77)  BP: 108/63 (07 Jan 2020 13:40) (101/52 - 108/63)  BP(mean): --  RR: 17 (07 Jan 2020 13:40) (15 - 18)  SpO2: 97% (07 Jan 2020 13:40) (91% - 97%)      LABS:                        8.8    20.80 )-----------( 198      ( 07 Jan 2020 05:53 )             27.7     01-07    148<H>  |  119<H>  |  66<H>  ----------------------------<  231<H>  4.0   |  19<L>  |  1.76<H>    Ca    8.4      07 Jan 2020 05:53  Phos  2.7     01-07  Mg     2.2     01-07    TPro  5.1<L>  /  Alb  1.6<L>  /  TBili  0.9  /  DBili  x   /  AST  59<H>  /  ALT  58<H>  /  AlkPhos  97  01-06    PT/INR - ( 07 Jan 2020 05:53 )   PT: 14.1 sec;   INR: 1.23          PTT - ( 07 Jan 2020 05:53 )  PTT:31.0 sec      MICROBIOLOGY:    RADIOLOGY & ADDITIONAL STUDIES: INTERVAL HPI/OVERNIGHT EVENTS:  No significant changes  No diarrhea    MEDICATIONS  (STANDING):  ampicillin/sulbactam  IVPB 3 Gram(s) IV Intermittent every 12 hours  clopidogrel Tablet 75 milliGRAM(s) Oral daily  dextrose 5% + sodium chloride 0.45%. 1000 milliLiter(s) (75 mL/Hr) IV Continuous <Continuous>  dextrose 5%. 1000 milliLiter(s) (50 mL/Hr) IV Continuous <Continuous>  dextrose 50% Injectable 12.5 Gram(s) IV Push once  dextrose 50% Injectable 25 Gram(s) IV Push once  dextrose 50% Injectable 25 Gram(s) IV Push once  heparin  Injectable 5000 Unit(s) SubCutaneous every 12 hours  insulin glargine Injectable (LANTUS) 10 Unit(s) SubCutaneous at bedtime  insulin lispro (HumaLOG) corrective regimen sliding scale   SubCutaneous Before meals and at bedtime  levothyroxine Injectable 90 MICROGram(s) IV Push <User Schedule>  povidone iodine 10% Solution 1 Application(s) Topical once  simvastatin 20 milliGRAM(s) Oral at bedtime    MEDICATIONS  (PRN):  dextrose 40% Gel 15 Gram(s) Oral once PRN Blood Glucose LESS THAN 70 milliGRAM(s)/deciliter  glucagon  Injectable 1 milliGRAM(s) IntraMuscular once PRN Glucose LESS THAN 70 milligrams/deciliter      Allergies    No Known Allergies    EXAM  Vital Signs Last 24 Hrs  T(C): 37.2 (07 Jan 2020 13:40), Max: 37.2 (06 Jan 2020 20:36)  T(F): 99 (07 Jan 2020 13:40), Max: 99 (07 Jan 2020 13:40)  HR: 68 (07 Jan 2020 13:40) (68 - 77)  BP: 108/63 (07 Jan 2020 13:40) (101/52 - 108/63)  BP(mean): --  RR: 17 (07 Jan 2020 13:40) (15 - 18)  SpO2: 97% (07 Jan 2020 13:40) (91% - 97%)  On NCO2  Calm  No rash  RR  Chest with bilateral rhonchi  Abd soft NT  LE contracted and no edema    LABS:                        8.8    20.80 )-----------( 198      ( 07 Jan 2020 05:53 )             27.7     01-07    148<H>  |  119<H>  |  66<H>  ----------------------------<  231<H>  4.0   |  19<L>  |  1.76<H>    Ca    8.4      07 Jan 2020 05:53  Phos  2.7     01-07  Mg     2.2     01-07    TPro  5.1<L>  /  Alb  1.6<L>  /  TBili  0.9  /  DBili  x   /  AST  59<H>  /  ALT  58<H>  /  AlkPhos  97  01-06    PT/INR - ( 07 Jan 2020 05:53 )   PT: 14.1 sec;   INR: 1.23          PTT - ( 07 Jan 2020 05:53 )  PTT:31.0 sec      MICROBIOLOGY:  Culture - Blood (01.05.20 @ 13:10)    Specimen Source: .Blood Blood    Culture Results:   No growth at 2 days.    Culture - Blood (01.05.20 @ 13:10)    Specimen Source: .Blood Blood    Culture Results:   No growth at 2 days.

## 2020-01-07 NOTE — PROGRESS NOTE ADULT - PROBLEM SELECTOR PLAN 2
Patient presenting with 2 month of productive cough yellow sputum, found to be febrile, found to have moderate sized right sided consolidation consisting with pneumonia. s/p vancomycin 1g, zosyn 3.375g in the ED  - c/w unasyn 3g q12 hours  - consulted ID  - speech and swallow eval  - pulm consult    #Aspiration  -S+S will evaluate pt  -pt to be NPO during time  -started D5 + 1/2 NS 75cc/hr for maintenance nutrition while NPO His 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. Although there are some areas of erythema that could reflect mild cellulitis, his sepsis is likely due to an underlying pulmonary etiology  - vascular surgery said no surgical intervention, except dressing changes  - wound care: apply betadine to areas of gangrene/necrosis, wrap the feet with kerlex

## 2020-01-07 NOTE — PROGRESS NOTE ADULT - PROBLEM SELECTOR PLAN 1
Admission labs showing severely elevated WBC, elevated lactate, and CXR with showing moderate right effusion concerning for pneumonia. Sepsis (leukocytosis, fever, elevated lactate), with source 2/2 to PNA likely aspiration in the setting of cough and difficulty swallowing food for 1 month. In the ED patient receive acetaminophen 650mg, vancomycin 1g, zosyn 3.375g, 1L NS bolus  - resolved    #Hypotension  pt w/ o/n bp 70's/40's given 1.5 fluids, unclear why pt bp dropped as had been hypertensive during admission. No signs of bleeding. Bp now stable 90's/50's, pt asymptomatic  -monitor and resuscitate w/ fluids but watch out for pulmonary edema, although no hx of chf Patient presenting with 2 month of productive cough yellow sputum, found to be febrile, found to have moderate sized right sided consolidation consisting with pneumonia. s/p vancomycin 1g, zosyn 3.375g in the ED  - c/w unasyn 3g q12 hours  - ID following  - S+S, FEES exam found dysphagia  - consulted Dr. Wright for PEG per family  - pulm consult    #Aspiration  -S+S will evaluate pt  -pt to be NPO during time  -started D5 + 1/2 NS 75cc/hr for maintenance nutrition while NPO

## 2020-01-07 NOTE — PROGRESS NOTE ADULT - PROBLEM SELECTOR PLAN 3
His 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. Although there are some areas of erythema that could reflect mild cellulitis, his sepsis is likely due to an underlying pulmonary etiology  - vascular surgery said no surgical intervention, except dressing changes  - wound care: apply betadine to areas of gangrene/necrosis, wrap the feet with kerlex history of CAD  - takes plavix 75mg daily at home

## 2020-01-07 NOTE — CONSULT NOTE ADULT - ASSESSMENT
per Internal Medicine    89M PMH DM, HTN, HLD, surgical hx of uncertain vascular procedure in left leg (presumed arthrectomy or angioplasty) presents with generalized weakness admitted with severe sepsis secondary to aspiration pneumonia.     Problem/Plan - 1:  ·  Problem: Severe sepsis.  Plan: Admission labs showing severely elevated WBC, elevated lactate, and CXR with showing moderate right effusion concerning for pneumonia. Sepsis (leukocytosis, fever, elevated lactate), with source 2/2 to PNA likely aspiration in the setting of cough and difficulty swallowing food for 1 month. In the ED patient receive acetaminophen 650mg, vancomycin 1g, zosyn 3.375g, 1L NS bolus  - resolved    #Hypotension  pt w/ o/n bp 70's/40's given 1.5 fluids, unclear why pt bp dropped as had been hypertensive during admission. No signs of bleeding. Bp now stable 90's/50's, pt asymptomatic  -monitor and resuscitate w/ fluids but watch out for pulmonary edema, although no hx of chf.    Problem/Plan - 2:  ·  Problem: Aspiration pneumonia.  Plan: Patient presenting with 2 month of productive cough yellow sputum, found to be febrile, found to have moderate sized right sided consolidation consisting with pneumonia. s/p vancomycin 1g, zosyn 3.375g in the ED  - c/w unasyn 3g q12 hours  - consulted ID  - speech and swallow eval  - pulm consult    #Aspiration  -S+S will evaluate pt  -pt to be NPO during time  -started D5 + 1/2 NS 75cc/hr for maintenance nutrition while NPO.    Problem/Plan - 3:  ·  Problem: PVD (peripheral vascular disease).  Plan: His 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. Although there are some areas of erythema that could reflect mild cellulitis, his sepsis is likely due to an underlying pulmonary etiology  - vascular surgery said no surgical intervention, except dressing changes  - wound care: apply betadine to areas of gangrene/necrosis, wrap the feet with kerlex.    Problem/Plan - 4:  ·  Problem: CAD (coronary artery disease).  Plan: history of CAD  - takes plavix 75mg daily at home.     Problem/Plan - 5:  ·  Problem: Transaminitis.  Plan: elevated alk phos and LFTs likely reactive in the setting of severe sepsis  - trend cmp.     Problem/Plan - 6:  Problem: Hyperlipidemia. Plan: history of hyperlipidemia  - continue with home simvastatin 20mg daily    #Hypothyroidism  - history of hypothyroidism   - TSH 1.391  - continue home dose synthroid 125mcg.    Problem/Plan - 7:  ·  Problem: Hypertension.  Plan: - past medical history of hypertension  - takes lisinopril/HCTZ 10/12.5mg and coreg 6.25mg BID at home  - holding home antihypertensives in the setting of sepsis.     Problem/Plan - 8:  ·  Problem: Diabetes.  Plan: hx DM2. holding home doses of januvia 50mg and nateglinide 60mg   - ISS  -c/w lantus 10 U  -on D5+1/2 NS 75cc/hr for nutrition while NPO  -monitor sugars.    Problem/Plan - 9:  ·  Problem: Prophylactic measure.  Plan: F: 1/2 NS + D5 75cc/hr  E: Replete K<4, Mg<2  N: NPO  A: Plavix 75mg.

## 2020-01-07 NOTE — PROGRESS NOTE ADULT - PROBLEM SELECTOR PLAN 8
hx DM2. holding home doses of januvia 50mg and nateglinide 60mg   - ISS  -c/w lantus 10 U  -on D5+1/2 NS 75cc/hr for nutrition while NPO  -monitor sugars F: 1/2 NS + D5 75cc/hr  E: Replete K<4, Mg<2  N: NPO  A: Plavix 75mg

## 2020-01-07 NOTE — PROGRESS NOTE ADULT - SUBJECTIVE AND OBJECTIVE BOX
OVERNIGHT EVENTS: STEWART    SUBJECTIVE / INTERVAL HPI: Patient seen and examined at bedside. Pt initially lethargic, but more arousable when daughter present. Indicated he did not want the bipap and made him uncomfortably.     VITAL SIGNS:  Vital Signs Last 24 Hrs  T(C): 37.2 (06 Jan 2020 13:32), Max: 38.1 (05 Jan 2020 16:41)  T(F): 98.9 (06 Jan 2020 13:32), Max: 100.6 (05 Jan 2020 16:41)  HR: 83 (06 Jan 2020 13:32) (68 - 83)  BP: 95/59 (06 Jan 2020 13:32) (85/43 - 149/80)  BP(mean): --  RR: 18 (06 Jan 2020 13:32) (17 - 31)  SpO2: 94% (06 Jan 2020 13:32) (92% - 95%)    PHYSICAL EXAM:    General: elderly, cachexic, on bipap, responsive to stimuli and command  HEENT: NC/AT; PERRL, anicteric sclera; dry mucous membrane  Neck: supple  Cardiovascular: +S1/S2; RRR  Respiratory: b/l rhochi  Gastrointestinal: soft, NT/ND; +BSx4  Extremities: no leg edema, 5/5 muscle strength hands  Vascular: 2+ radial,   Neurological: no focal deficits    MEDICATIONS:  MEDICATIONS  (STANDING):  ampicillin/sulbactam  IVPB 3 Gram(s) IV Intermittent every 12 hours  clopidogrel Tablet 75 milliGRAM(s) Oral daily  dextrose 5% + sodium chloride 0.45%. 1000 milliLiter(s) (75 mL/Hr) IV Continuous <Continuous>  dextrose 5%. 1000 milliLiter(s) (50 mL/Hr) IV Continuous <Continuous>  dextrose 50% Injectable 12.5 Gram(s) IV Push once  dextrose 50% Injectable 25 Gram(s) IV Push once  dextrose 50% Injectable 25 Gram(s) IV Push once  insulin glargine Injectable (LANTUS) 10 Unit(s) SubCutaneous at bedtime  insulin lispro (HumaLOG) corrective regimen sliding scale   SubCutaneous Before meals and at bedtime  levothyroxine Injectable 90 MICROGram(s) IV Push <User Schedule>  povidone iodine 10% Solution 1 Application(s) Topical once  simvastatin 20 milliGRAM(s) Oral at bedtime    MEDICATIONS  (PRN):  dextrose 40% Gel 15 Gram(s) Oral once PRN Blood Glucose LESS THAN 70 milliGRAM(s)/deciliter  glucagon  Injectable 1 milliGRAM(s) IntraMuscular once PRN Glucose LESS THAN 70 milligrams/deciliter      ALLERGIES:  Allergies    No Known Allergies    Intolerances        LABS:                        8.7    24.10 )-----------( 210      ( 06 Jan 2020 05:48 )             27.5     01-06    149<H>  |  119<H>  |  63<H>  ----------------------------<  220<H>  4.2   |  19<L>  |  2.01<H>    Ca    8.4      06 Jan 2020 05:48  Phos  2.8     01-06  Mg     2.1     01-06    TPro  5.1<L>  /  Alb  1.6<L>  /  TBili  0.9  /  DBili  x   /  AST  59<H>  /  ALT  58<H>  /  AlkPhos  97  01-06    PT/INR - ( 05 Jan 2020 12:37 )   PT: 14.4 sec;   INR: 1.25          PTT - ( 05 Jan 2020 12:37 )  PTT:26.6 sec  Urinalysis Basic - ( 05 Jan 2020 13:11 )    Color: Yellow / Appearance: Clear / SG: >=1.030 / pH: x  Gluc: x / Ketone: NEGATIVE  / Bili: Negative / Urobili: 0.2 E.U./dL   Blood: x / Protein: NEGATIVE mg/dL / Nitrite: NEGATIVE   Leuk Esterase: NEGATIVE / RBC: x / WBC x   Sq Epi: x / Non Sq Epi: x / Bacteria: x      CAPILLARY BLOOD GLUCOSE      POCT Blood Glucose.: 186 mg/dL (06 Jan 2020 11:50)      RADIOLOGY & ADDITIONAL TESTS: Reviewed.    ASSESSMENT:    PLAN: OVERNIGHT EVENTS: STEWART    SUBJECTIVE / INTERVAL HPI: Patient seen and examined at bedside. Pt daughter translated via phone to pt who indicated that he was feeling okay and breathing fine. Denied fever, chills, chest pain, sob     T(F): 99 (01-07-20 @ 13:40)  HR: 68 (01-07-20 @ 13:40)  BP: 108/63 (01-07-20 @ 13:40)  RR: 17 (01-07-20 @ 13:40)  SpO2: 97% (01-07-20 @ 13:40)    PHYSICAL EXAM:    General: elderly, cachexic, on 4L NC, responsive to stimuli and command  HEENT: NC/AT; PERRL, anicteric sclera; dry mucous membrane  Neck: supple  Cardiovascular: +S1/S2; RRR  Respiratory: b/l rhochi, slight abdominal breathing  Gastrointestinal: soft, NT/ND; +BSx4  Extremities: no leg edema, 5/5 muscle strength hands  Vascular: 2+ radial,   Neurological: no focal deficits    MEDICATIONS:  MEDICATIONS  (STANDING):  ampicillin/sulbactam  IVPB 3 Gram(s) IV Intermittent every 12 hours  clopidogrel Tablet 75 milliGRAM(s) Oral daily  dextrose 5% + sodium chloride 0.45%. 1000 milliLiter(s) (75 mL/Hr) IV Continuous <Continuous>  dextrose 5%. 1000 milliLiter(s) (50 mL/Hr) IV Continuous <Continuous>  dextrose 50% Injectable 12.5 Gram(s) IV Push once  dextrose 50% Injectable 25 Gram(s) IV Push once  dextrose 50% Injectable 25 Gram(s) IV Push once  insulin glargine Injectable (LANTUS) 10 Unit(s) SubCutaneous at bedtime  insulin lispro (HumaLOG) corrective regimen sliding scale   SubCutaneous Before meals and at bedtime  levothyroxine Injectable 90 MICROGram(s) IV Push <User Schedule>  povidone iodine 10% Solution 1 Application(s) Topical once  simvastatin 20 milliGRAM(s) Oral at bedtime    MEDICATIONS  (PRN):  dextrose 40% Gel 15 Gram(s) Oral once PRN Blood Glucose LESS THAN 70 milliGRAM(s)/deciliter  glucagon  Injectable 1 milliGRAM(s) IntraMuscular once PRN Glucose LESS THAN 70 milligrams/deciliter      ALLERGIES:  Allergies    No Known Allergies    Intolerances        LABS:                        8.7    24.10 )-----------( 210      ( 06 Jan 2020 05:48 )             27.5     01-06    149<H>  |  119<H>  |  63<H>  ----------------------------<  220<H>  4.2   |  19<L>  |  2.01<H>    Ca    8.4      06 Jan 2020 05:48  Phos  2.8     01-06  Mg     2.1     01-06    TPro  5.1<L>  /  Alb  1.6<L>  /  TBili  0.9  /  DBili  x   /  AST  59<H>  /  ALT  58<H>  /  AlkPhos  97  01-06    PT/INR - ( 05 Jan 2020 12:37 )   PT: 14.4 sec;   INR: 1.25          PTT - ( 05 Jan 2020 12:37 )  PTT:26.6 sec  Urinalysis Basic - ( 05 Jan 2020 13:11 )    Color: Yellow / Appearance: Clear / SG: >=1.030 / pH: x  Gluc: x / Ketone: NEGATIVE  / Bili: Negative / Urobili: 0.2 E.U./dL   Blood: x / Protein: NEGATIVE mg/dL / Nitrite: NEGATIVE   Leuk Esterase: NEGATIVE / RBC: x / WBC x   Sq Epi: x / Non Sq Epi: x / Bacteria: x      CAPILLARY BLOOD GLUCOSE      POCT Blood Glucose.: 186 mg/dL (06 Jan 2020 11:50)      RADIOLOGY & ADDITIONAL TESTS: Reviewed.    ASSESSMENT:    PLAN:

## 2020-01-07 NOTE — CONSULT NOTE ADULT - SUBJECTIVE AND OBJECTIVE BOX
Patient is a 89y old  Male who presents with a chief complaint of sepsis secondary to aspiration pneumonia (06 Jan 2020 15:24)       HPI:  89M PMH DM, HTN, HLD, surgical hx of uncertain vascular procedure in left leg (presumed arthrectomy or angioplasty) presents with generalized weakness. The patient is not able to speak when questioned during the encounter and the daughter at bedside is able to provide the history. As per the daughter, the patient was noted to be diagnosed with pneumonia 2 months ago and was treated with antibiotics as an outpatient. Since then, the patient has been having a progressive productive cough with yellow sputum. He has been having difficulty eating in which she notes that he coughs up his food upon swallowing. This past month he has been feeling progressively weaker and today had trouble moving from the car to the wheelchair which prompted his ER visit. Of note, the daughter notes that 3 months ago, the patient had a stent recently placed in the left leg for peripheral vascular disease. The patient is not able to give a history given his mental status and review of systems cannot be assessed.    ED Course: Vitals: T 100.6F HR 83 BP 97/53 RR 18 O2 96% 1L NC  Chest xray: Right effusion. Small left effusion cannot be excluded.   Labs: Wbc 36.83 Hb; 11.8 Plt 262 Cl 110 CO2 19 BUN/Cr 61/1.96 Glucose 413 Lactate 3.3 elevated alk phos and LFTs, trops 0.23 UA neg, RSV neg  Given Vanc 1g, zosyn 3.375g, 1L NS Bolus, acetaminophen 650mg suppository, insulin regular 4mg IVP, blood and urine cultures drawn (05 Jan 2020 17:25)      PAST MEDICAL & SURGICAL HISTORY:  PVD (peripheral vascular disease)  CAD (coronary artery disease)  Hyperlipidemia  Hypertension  Diabetes  PVD (peripheral vascular disease): s/p stent placement in left leg 3 months ago      MEDICATIONS  (STANDING):  ampicillin/sulbactam  IVPB 3 Gram(s) IV Intermittent every 12 hours  clopidogrel Tablet 75 milliGRAM(s) Oral daily  dextrose 5% + sodium chloride 0.45%. 1000 milliLiter(s) (75 mL/Hr) IV Continuous <Continuous>  dextrose 5%. 1000 milliLiter(s) (50 mL/Hr) IV Continuous <Continuous>  dextrose 50% Injectable 12.5 Gram(s) IV Push once  dextrose 50% Injectable 25 Gram(s) IV Push once  dextrose 50% Injectable 25 Gram(s) IV Push once  insulin glargine Injectable (LANTUS) 10 Unit(s) SubCutaneous at bedtime  insulin lispro (HumaLOG) corrective regimen sliding scale   SubCutaneous Before meals and at bedtime  levothyroxine Injectable 90 MICROGram(s) IV Push <User Schedule>  povidone iodine 10% Solution 1 Application(s) Topical once  simvastatin 20 milliGRAM(s) Oral at bedtime    MEDICATIONS  (PRN):  dextrose 40% Gel 15 Gram(s) Oral once PRN Blood Glucose LESS THAN 70 milliGRAM(s)/deciliter  glucagon  Injectable 1 milliGRAM(s) IntraMuscular once PRN Glucose LESS THAN 70 milligrams/deciliter      Social History:           -  Occupation: X           -  Home Living Status: lives with his daughter in an elevator accessible apartment building           -  Prior Home Care Services:  24 hrs x 7 days    Functional Level Prior to Admission:           - ADL's:  requires assistance with bathing/ dressing/toileting           - ambulation: mostly bedbound    FAMILY HISTORY:      CBC Full  -  ( 07 Jan 2020 05:53 )  WBC Count : 20.80 K/uL  RBC Count : 3.35 M/uL  Hemoglobin : 8.8 g/dL  Hematocrit : 27.7 %  Platelet Count - Automated : 198 K/uL  Mean Cell Volume : 82.7 fl  Mean Cell Hemoglobin : 26.3 pg  Mean Cell Hemoglobin Concentration : 31.8 gm/dL  Auto Neutrophil # : x  Auto Lymphocyte # : x  Auto Monocyte # : x  Auto Eosinophil # : x  Auto Basophil # : x  Auto Neutrophil % : x  Auto Lymphocyte % : x  Auto Monocyte % : x  Auto Eosinophil % : x  Auto Basophil % : x      01-07    148<H>  |  119<H>  |  66<H>  ----------------------------<  231<H>  4.0   |  19<L>  |  1.76<H>    Ca    8.4      07 Jan 2020 05:53  Phos  2.7     01-07  Mg     2.2     01-07    TPro  5.1<L>  /  Alb  1.6<L>  /  TBili  0.9  /  DBili  x   /  AST  59<H>  /  ALT  58<H>  /  AlkPhos  97  01-06      Urinalysis Basic - ( 05 Jan 2020 13:11 )    Color: Yellow / Appearance: Clear / SG: >=1.030 / pH: x  Gluc: x / Ketone: NEGATIVE  / Bili: Negative / Urobili: 0.2 E.U./dL   Blood: x / Protein: NEGATIVE mg/dL / Nitrite: NEGATIVE   Leuk Esterase: NEGATIVE / RBC: x / WBC x   Sq Epi: x / Non Sq Epi: x / Bacteria: x          Radiology:    < from: Xray Chest 1 View-PORTABLE IMMEDIATE (01.06.20 @ 05:04) >  EXAM:  XR CHEST PORTABLE IMMED 1V                          PROCEDURE DATE:  01/06/2020          INTERPRETATION:  Clinical history/reason for exam: Shortness of breath.    Single frontal view with the patient's chin obscuring the superior mediastinum.    Comparison: January 5, 2020.    Findings/  impression: Lung pathology, worsened. Heart size within normal limits, thoracic calcification. Stable bony structures.                    Vital Signs Last 24 Hrs  T(C): 36.3 (07 Jan 2020 06:04), Max: 37.2 (06 Jan 2020 13:32)  T(F): 97.3 (07 Jan 2020 06:04), Max: 98.9 (06 Jan 2020 13:32)  HR: 69 (07 Jan 2020 06:04) (69 - 83)  BP: 108/63 (07 Jan 2020 06:04) (91/59 - 108/63)  BP(mean): --  RR: 15 (07 Jan 2020 06:04) (15 - 19)  SpO2: 94% (07 Jan 2020 06:04) (94% - 96%)    REVIEW OF SYSTEMS:    CONSTITUTIONAL: No fever, weight loss, or fatigue  EYES: No eye pain, visual disturbances, or discharge  ENMT:  No difficulty hearing, tinnitus, vertigo; No sinus or throat pain  NECK: No pain or stiffness  BREASTS: No pain, masses, or nipple discharge  RESPIRATORY: No cough, wheezing, chills or hemoptysis; No shortness of breath  CARDIOVASCULAR: No chest pain, palpitations, dizziness, or leg swelling  GASTROINTESTINAL: No abdominal or epigastric pain. No nausea, vomiting, or hematemesis; No diarrhea or constipation. No melena or hematochezia.  GENITOURINARY: No dysuria, frequency, hematuria, or incontinence  NEUROLOGICAL: No headaches, memory loss, loss of strength, numbness, or tremors  SKIN: No itching, burning, rashes, or lesions   LYMPH NODES: No enlarged glands  ENDOCRINE: No heat or cold intolerance; No hair loss  MUSCULOSKELETAL: No joint pain or swelling; No muscle, back, or extremity pain  PSYCHIATRIC: No depression, anxiety, mood swings, or difficulty sleeping  HEME/LYMPH: No easy bruising, or bleeding gums  ALLERGY AND IMMUNOLOGIC: No hives or eczema  VASCULAR: no swelling, erythema        Physical Exam: frail 89 yo  gentleman lying in semi Phillips's position, confused, NAD    Head: normocephalic, atraumatic    Eyes: PERRLA, EOMI, no nystagmus, sclera anicteric    ENT: nasal discharge, uvula midline, no oropharyngeal erythema/exudate    Neck: supple, negative JVD, negative carotid bruits, no thyromegaly    Chest: bilateral rhonchi    Cardiovascular: regular rate and rhythm, neg murmurs/rubs/gallops    Abdomen: soft, non distended, non tender, negative rebound/guarding, normal bowel sounds, neg hepatosplenomegaly    Extremities: bilateral multiple foot/ toe necrosis    :     Neurologic Exam:    Alert and oriented x 0 to person, place, date/year, speech fluent w/o dysarthria  Cranial Nerves:     II:                       pupils equal, round and reactive to light, visual fields intact   III/ IV/VI:            extraocular movements intact, neg nystagmus, neg ptosis  V:                       facial sensation intact, V1-3 normal  VII:                     face symmetric, no droop, normal eye closure and smile  VIII:                    hearing intact to finger rub bilaterally  IX/ X:                 soft palate rise symmetrical  XI:                      head turning, shoulder shrug normal  XII:                     tongue midline    Motor Exam:    Upper Extremities:     RIght:   poor effort > 3-/5    Left :   poor effort > 3-/5    Lower Extremities:                 Right:  poor effort > 3-/5                 Left:      poor effort > 3-/5                   Sensory:    intact to LT/PP in all UE/LE dermatomes    DTR:            = biceps/     triceps/     brachioradialis                      = patella/   medial hamstring/ankle                      neg clonus                      neg Babinski                        Gait:  not tested        PM&R Impression:    1) deconditioned  2) no focal weakness        Recommendations:    1) Physical therapy focusing on therapeutic exercises, bed mobility/transfer out of bed evaluation, progressive ambulation with assistive devices prn.    2) Disposition Plan/Recs: d/c home, home physical therapy, resume 24 hr x 7 day home care

## 2020-01-07 NOTE — PROGRESS NOTE ADULT - PROBLEM SELECTOR PLAN 5
elevated alk phos and LFTs likely reactive in the setting of severe sepsis  - trend cmp history of hyperlipidemia  - continue with home simvastatin 20mg daily    #Hypothyroidism  - history of hypothyroidism   - TSH 1.391  - continue home dose synthroid 125mcg

## 2020-01-07 NOTE — PROGRESS NOTE ADULT - SUBJECTIVE AND OBJECTIVE BOX
CC/ HPI Patient is a 89 year old male with CAD, HTN, PVD, LLE vascular procedure admitted with severe sepsis secondary to aspiration pneumonia, seen this morning the patient denies acute respiratory complaint.    PAST MEDICAL & SURGICAL HISTORY:  PVD (peripheral vascular disease)  CAD (coronary artery disease)  Hyperlipidemia  Hypertension  Diabetes  PVD (peripheral vascular disease): s/p stent placement in left leg 3 months ago    SOCHX:   -  alcohol    FMHX: FA/MO  - contributory     ROS reviewed below with positive findings marked (+) :  GEN:  fever, chills ENT: tracheostomy,   epistaxis,  sinusitis COR: +CAD, CHF,  +HTN, dysrhythmia PUL: COPD, ILD, asthma, pneumonia GI: PEG, dysphagia, hemorrhage, other MANDO: kidney disease, electrolyte disorder HEM:  anemia, thrombus, coagulopathy, cancer ENDO:  thyroid disease,+ diabetes mellitus CNS:  dementia, stroke, seizure, PSY:  depression, anxiety, other      MEDICATIONS  (STANDING):  ampicillin/sulbactam  IVPB 3 Gram(s) IV Intermittent every 12 hours  clopidogrel Tablet 75 milliGRAM(s) Oral daily  heparin  Injectable 5000 Unit(s) SubCutaneous every 12 hours  insulin glargine Injectable (LANTUS) 10 Unit(s) SubCutaneous at bedtime  insulin lispro (HumaLOG) corrective regimen sliding scale   SubCutaneous Before meals and at bedtime  levothyroxine Injectable 90 MICROGram(s) IV Push <User Schedule>  povidone iodine 10% Solution 1 Application(s) Topical once  simvastatin 20 milliGRAM(s) Oral at bedtime    MEDICATIONS  (PRN):  dextrose 40% Gel 15 Gram(s) Oral once PRN Blood Glucose LESS THAN 70 milliGRAM(s)/deciliter  glucagon  Injectable 1 milliGRAM(s) IntraMuscular once PRN Glucose LESS THAN 70 milligrams/deciliter      Vital Signs Last 24 Hrs  T(C): 37.2 (07 Jan 2020 13:40), Max: 37.2 (06 Jan 2020 20:36)  T(F): 99 (07 Jan 2020 13:40), Max: 99 (07 Jan 2020 13:40)  HR: 68 (07 Jan 2020 13:40) (68 - 77)  BP: 108/63 (07 Jan 2020 13:40) (101/52 - 108/63)  RR: 17 (07 Jan 2020 13:40) (15 - 18)  SpO2: 97% (07 Jan 2020 13:40) (91% - 97%)    GENERAL:         comfortable,  - distress.  HEENT:            - trauma,  - icterus,  - injection,  - nasal discharge.  NECK:              - jugular venous distention, - thyromegaly.  LYMPH:           - lymphadenopathy, - masses.  RESP:              + rhonchi,  - rales,    - wheezes.   COR:                S1S2   - gallops,  - rubs.  ABD:                bowel sounds,   soft, - tender, - distended.  EXT/MSC:         - cyanosis,  - clubbing, - edema.    NEURO:           + alert                           8.8    20.80 )-----------( 198      ( 07 Jan 2020 05:53 )             27.7     01-07    148<H>  |  119<H>  |  66<H>  ----------------------------<  231<H>  4.0   |  19<L>  |  1.76<H>        X-ray Chest (01.06.20)  Lung pathology, worsened. Heart size within normal limits, thoracic calcification. Stable bony structures.    X-ray Chest  (01.05.20)  Congestion and/or infiltrates. Right effusion. Small left effusion cannot be excluded        ASSESSMENT/PLAN    1) Pneumonia  2) Pleural effusion  3) CVD/HTN    Oxygen as needed for a satisfactory SpO2  Consider CT scan of the chest  Bronchodilators:  Atrovent/ albuterol q 4 – 6 hours as needed  ID/Antibiotics:  ampicillin/sulbactam  Cardiac/HTN:  hemodynamically stable  GI: Rx/ prophylaxis c PPI/H2B  Heme: Rx/VT prophylaxis  Discussed with medical team  Discussed with Dr. Monsalve.

## 2020-01-07 NOTE — PROGRESS NOTE ADULT - PROBLEM SELECTOR PLAN 7
- past medical history of hypertension  - takes lisinopril/HCTZ 10/12.5mg and coreg 6.25mg BID at home  - holding home antihypertensives in the setting of sepsis hx DM2. holding home doses of januvia 50mg and nateglinide 60mg   - ISS  -c/w lantus 10 U  -on D5+1/2 NS 75cc/hr for nutrition while NPO  -monitor sugars

## 2020-01-07 NOTE — PROGRESS NOTE ADULT - PROBLEM SELECTOR PLAN 6
history of hyperlipidemia  - continue with home simvastatin 20mg daily    #Hypothyroidism  - history of hypothyroidism   - TSH 1.391  - continue home dose synthroid 125mcg - past medical history of hypertension  - takes lisinopril/HCTZ 10/12.5mg and coreg 6.25mg BID at home  - holding home antihypertensives in the setting of sepsis

## 2020-01-07 NOTE — PROGRESS NOTE ADULT - ASSESSMENT
If leukocytosis not improving, would obtain chest CT  Continue AMp-Sulbactam Hypoxic respiratory failure  Pneumonia and sepsis duse to suspected aspiration  Overall debilitation and advanced age    RECOMMEND  If leukocytosis not improving, would obtain chest CT  Continue Amp-Sulbactam for now

## 2020-01-07 NOTE — PROGRESS NOTE ADULT - PROBLEM SELECTOR PLAN 4
history of CAD  - takes plavix 75mg daily at home elevated alk phos and LFTs likely reactive in the setting of severe sepsis  - trend cmp

## 2020-01-08 LAB
ANION GAP SERPL CALC-SCNC: 10 MMOL/L — SIGNIFICANT CHANGE UP (ref 5–17)
ANION GAP SERPL CALC-SCNC: 11 MMOL/L — SIGNIFICANT CHANGE UP (ref 5–17)
BUN SERPL-MCNC: 54 MG/DL — HIGH (ref 7–23)
BUN SERPL-MCNC: 54 MG/DL — HIGH (ref 7–23)
CALCIUM SERPL-MCNC: 8.5 MG/DL — SIGNIFICANT CHANGE UP (ref 8.4–10.5)
CALCIUM SERPL-MCNC: 8.7 MG/DL — SIGNIFICANT CHANGE UP (ref 8.4–10.5)
CHLORIDE SERPL-SCNC: 121 MMOL/L — HIGH (ref 96–108)
CHLORIDE SERPL-SCNC: 123 MMOL/L — HIGH (ref 96–108)
CO2 SERPL-SCNC: 18 MMOL/L — LOW (ref 22–31)
CO2 SERPL-SCNC: 19 MMOL/L — LOW (ref 22–31)
CREAT SERPL-MCNC: 1.46 MG/DL — HIGH (ref 0.5–1.3)
CREAT SERPL-MCNC: 1.49 MG/DL — HIGH (ref 0.5–1.3)
GLUCOSE BLDC GLUCOMTR-MCNC: 136 MG/DL — HIGH (ref 70–99)
GLUCOSE BLDC GLUCOMTR-MCNC: 156 MG/DL — HIGH (ref 70–99)
GLUCOSE BLDC GLUCOMTR-MCNC: 207 MG/DL — HIGH (ref 70–99)
GLUCOSE BLDC GLUCOMTR-MCNC: 263 MG/DL — HIGH (ref 70–99)
GLUCOSE SERPL-MCNC: 169 MG/DL — HIGH (ref 70–99)
GLUCOSE SERPL-MCNC: 266 MG/DL — HIGH (ref 70–99)
HCT VFR BLD CALC: 28.1 % — LOW (ref 39–50)
HGB BLD-MCNC: 8.6 G/DL — LOW (ref 13–17)
MAGNESIUM SERPL-MCNC: 2.1 MG/DL — SIGNIFICANT CHANGE UP (ref 1.6–2.6)
MCHC RBC-ENTMCNC: 25.8 PG — LOW (ref 27–34)
MCHC RBC-ENTMCNC: 30.6 GM/DL — LOW (ref 32–36)
MCV RBC AUTO: 84.4 FL — SIGNIFICANT CHANGE UP (ref 80–100)
NRBC # BLD: 0 /100 WBCS — SIGNIFICANT CHANGE UP (ref 0–0)
PLATELET # BLD AUTO: 191 K/UL — SIGNIFICANT CHANGE UP (ref 150–400)
POTASSIUM SERPL-MCNC: 3.6 MMOL/L — SIGNIFICANT CHANGE UP (ref 3.5–5.3)
POTASSIUM SERPL-MCNC: 3.8 MMOL/L — SIGNIFICANT CHANGE UP (ref 3.5–5.3)
POTASSIUM SERPL-SCNC: 3.6 MMOL/L — SIGNIFICANT CHANGE UP (ref 3.5–5.3)
POTASSIUM SERPL-SCNC: 3.8 MMOL/L — SIGNIFICANT CHANGE UP (ref 3.5–5.3)
RBC # BLD: 3.33 M/UL — LOW (ref 4.2–5.8)
RBC # FLD: 17.4 % — HIGH (ref 10.3–14.5)
SODIUM SERPL-SCNC: 149 MMOL/L — HIGH (ref 135–145)
SODIUM SERPL-SCNC: 153 MMOL/L — HIGH (ref 135–145)
WBC # BLD: 20.28 K/UL — HIGH (ref 3.8–10.5)
WBC # FLD AUTO: 20.28 K/UL — HIGH (ref 3.8–10.5)

## 2020-01-08 PROCEDURE — 99222 1ST HOSP IP/OBS MODERATE 55: CPT

## 2020-01-08 PROCEDURE — 71250 CT THORAX DX C-: CPT | Mod: 26

## 2020-01-08 PROCEDURE — 99221 1ST HOSP IP/OBS SF/LOW 40: CPT

## 2020-01-08 PROCEDURE — 70450 CT HEAD/BRAIN W/O DYE: CPT | Mod: 26

## 2020-01-08 RX ORDER — POTASSIUM CHLORIDE 20 MEQ
10 PACKET (EA) ORAL ONCE
Refills: 0 | Status: DISCONTINUED | OUTPATIENT
Start: 2020-01-08 | End: 2020-01-08

## 2020-01-08 RX ORDER — SODIUM CHLORIDE 9 MG/ML
1000 INJECTION, SOLUTION INTRAVENOUS
Refills: 0 | Status: DISCONTINUED | OUTPATIENT
Start: 2020-01-08 | End: 2020-01-13

## 2020-01-08 RX ORDER — SODIUM CHLORIDE 9 MG/ML
1000 INJECTION, SOLUTION INTRAVENOUS
Refills: 0 | Status: DISCONTINUED | OUTPATIENT
Start: 2020-01-08 | End: 2020-01-08

## 2020-01-08 RX ORDER — POTASSIUM CHLORIDE 20 MEQ
10 PACKET (EA) ORAL
Refills: 0 | Status: COMPLETED | OUTPATIENT
Start: 2020-01-08 | End: 2020-01-08

## 2020-01-08 RX ADMIN — Medication 3: at 08:39

## 2020-01-08 RX ADMIN — Medication 100 MILLIEQUIVALENT(S): at 13:35

## 2020-01-08 RX ADMIN — HEPARIN SODIUM 5000 UNIT(S): 5000 INJECTION INTRAVENOUS; SUBCUTANEOUS at 06:16

## 2020-01-08 RX ADMIN — AMPICILLIN SODIUM AND SULBACTAM SODIUM 200 GRAM(S): 250; 125 INJECTION, POWDER, FOR SUSPENSION INTRAMUSCULAR; INTRAVENOUS at 18:34

## 2020-01-08 RX ADMIN — AMPICILLIN SODIUM AND SULBACTAM SODIUM 200 GRAM(S): 250; 125 INJECTION, POWDER, FOR SUSPENSION INTRAMUSCULAR; INTRAVENOUS at 06:16

## 2020-01-08 RX ADMIN — Medication 100 MILLIEQUIVALENT(S): at 15:51

## 2020-01-08 RX ADMIN — Medication 2: at 12:24

## 2020-01-08 RX ADMIN — Medication 1: at 22:17

## 2020-01-08 RX ADMIN — Medication 100 MILLIEQUIVALENT(S): at 17:35

## 2020-01-08 RX ADMIN — INSULIN GLARGINE 10 UNIT(S): 100 INJECTION, SOLUTION SUBCUTANEOUS at 21:58

## 2020-01-08 RX ADMIN — SODIUM CHLORIDE 100 MILLILITER(S): 9 INJECTION, SOLUTION INTRAVENOUS at 12:25

## 2020-01-08 RX ADMIN — SIMVASTATIN 20 MILLIGRAM(S): 20 TABLET, FILM COATED ORAL at 22:17

## 2020-01-08 RX ADMIN — HEPARIN SODIUM 5000 UNIT(S): 5000 INJECTION INTRAVENOUS; SUBCUTANEOUS at 17:35

## 2020-01-08 NOTE — CONSULT NOTE ADULT - ASSESSMENT
I do not think at current time patient candidate for PEG as he still has white count that is elevated, just recently came off CPAP, needs CT to evaluate pneumonia, Na levels up  since patient seems to be clinically improving continue treatment  PEG in centenarian would be controversial as PEGS are not guarantee to prevent aspiration, does not improve quality of life and does not guarantee prolonged life expectancy  when patient gets better and can undergo procedure it should be a team and family consensus as to whether he is appropriate to get a gastrostomy depending on (patient's wishes if he can decide, family's wishes, goals of care) and not based on a sole individual's decision unless the sole individual is patient himself

## 2020-01-08 NOTE — PROGRESS NOTE ADULT - SUBJECTIVE AND OBJECTIVE BOX
OVERNIGHT EVENTS: STEWART    SUBJECTIVE / INTERVAL HPI: Patient seen and examined at bedside. Pt daughter translated via phone, pt indicated he is feeling well, thirsty, and would like to go home. Breathing comfortably. Denied fever, chills, chest pain, sob     T(F): 98.7 (01-08-20 @ 13:02)  HR: 72 (01-08-20 @ 13:02)  BP: 86/47 (01-08-20 @ 13:02)  RR: 18 (01-08-20 @ 13:02)  SpO2: 93% (01-08-20 @ 13:02)    PHYSICAL EXAM:    General: elderly, cachexic, on 4L NC, responsive to stimuli and command  HEENT: NC/AT; PERRL, anicteric sclera; dry mucous membrane  Neck: supple  Cardiovascular: +S1/S2; RRR  Respiratory: CTAB, diminished sounds lower fields  Gastrointestinal: soft, NT/ND; +BSx4  Extremities: no leg edema   Vascular: 2+ radial,   Neurological: no focal deficits    MEDICATIONS  (STANDING):  ampicillin/sulbactam  IVPB 3 Gram(s) IV Intermittent every 12 hours  clopidogrel Tablet 75 milliGRAM(s) Oral daily  dextrose 5%. 1000 milliLiter(s) (100 mL/Hr) IV Continuous <Continuous>  dextrose 5%. 1000 milliLiter(s) (50 mL/Hr) IV Continuous <Continuous>  dextrose 50% Injectable 12.5 Gram(s) IV Push once  dextrose 50% Injectable 25 Gram(s) IV Push once  dextrose 50% Injectable 25 Gram(s) IV Push once  heparin  Injectable 5000 Unit(s) SubCutaneous every 12 hours  insulin glargine Injectable (LANTUS) 10 Unit(s) SubCutaneous at bedtime  insulin lispro (HumaLOG) corrective regimen sliding scale   SubCutaneous Before meals and at bedtime  levothyroxine Injectable 90 MICROGram(s) IV Push <User Schedule>  potassium chloride  10 mEq/100 mL IVPB 10 milliEquivalent(s) IV Intermittent every 1 hour  povidone iodine 10% Solution 1 Application(s) Topical once  simvastatin 20 milliGRAM(s) Oral at bedtime    MEDICATIONS  (PRN):  dextrose 40% Gel 15 Gram(s) Oral once PRN Blood Glucose LESS THAN 70 milliGRAM(s)/deciliter  glucagon  Injectable 1 milliGRAM(s) IntraMuscular once PRN Glucose LESS THAN 70 milligrams/deciliter    .  LABS:                         8.6    20.28 )-----------( 191      ( 08 Jan 2020 05:52 )             28.1     01-08    153<H>  |  123<H>  |  54<H>  ----------------------------<  266<H>  3.6   |  19<L>  |  1.46<H>    Ca    8.7      08 Jan 2020 05:52  Phos  2.7     01-07  Mg     2.1     01-08      PT/INR - ( 07 Jan 2020 05:53 )   PT: 14.1 sec;   INR: 1.23          PTT - ( 07 Jan 2020 05:53 )  PTT:31.0 sec              RADIOLOGY, EKG & ADDITIONAL TESTS: Reviewed.     RADIOLOGY & ADDITIONAL TESTS: Reviewed.    ASSESSMENT:    PLAN: Hospital Course: p/w cough and generalized weakness. [id number], hypotensive SBPs 90s, elevated wbc 36, lactate 3.3. Given vanc and zosyn. Blood/urine cultures sent. Given 2 L IV NS. Right pleural effusion on chest xray, troponins peaked. Pt evaluated in ED by vascular for gangrenous toes and ulcers by vascular. ICU consulted for hemodynamic instability. ICU deemed pt stable for floors. Pt again experienced BPs to 60s/30s, tachypnea to RR 30, pt put on bipap. Repeat CXR looked the same vs more congested. Pt improved on BIPAP, tranisitoned to 4L NC. Switched to unasyn for aspiration pneumonia. S+S FEES found pt w/ severe dysphagia, and recommended NPO. GI consuled for PEG per family. started fluids with D5 for nutrition + hypernatremia/hyperchloremia. 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 Ordered CTH for Toxic metabolic encepholopathy per neuro. Ordered for CT chest per pulm.        OVERNIGHT EVENTS: STEWART    SUBJECTIVE / INTERVAL HPI: Patient seen and examined at bedside. Pt daughter translated via phone, pt indicated he is feeling well, thirsty, and would like to go home. Breathing comfortably. Denied fever, chills, chest pain, sob     T(F): 98.7 (01-08-20 @ 13:02)  HR: 72 (01-08-20 @ 13:02)  BP: 86/47 (01-08-20 @ 13:02)  RR: 18 (01-08-20 @ 13:02)  SpO2: 93% (01-08-20 @ 13:02)    PHYSICAL EXAM:    General: elderly, cachexic, on 4L NC, responsive to stimuli and command  HEENT: NC/AT; PERRL, anicteric sclera; dry mucous membrane  Neck: supple  Cardiovascular: +S1/S2; RRR  Respiratory: CTAB, diminished sounds lower fields  Gastrointestinal: soft, NT/ND; +BSx4  Extremities: no leg edema   Vascular: 2+ radial,   Neurological: no focal deficits    MEDICATIONS  (STANDING):  ampicillin/sulbactam  IVPB 3 Gram(s) IV Intermittent every 12 hours  clopidogrel Tablet 75 milliGRAM(s) Oral daily  dextrose 5%. 1000 milliLiter(s) (100 mL/Hr) IV Continuous <Continuous>  dextrose 5%. 1000 milliLiter(s) (50 mL/Hr) IV Continuous <Continuous>  dextrose 50% Injectable 12.5 Gram(s) IV Push once  dextrose 50% Injectable 25 Gram(s) IV Push once  dextrose 50% Injectable 25 Gram(s) IV Push once  heparin  Injectable 5000 Unit(s) SubCutaneous every 12 hours  insulin glargine Injectable (LANTUS) 10 Unit(s) SubCutaneous at bedtime  insulin lispro (HumaLOG) corrective regimen sliding scale   SubCutaneous Before meals and at bedtime  levothyroxine Injectable 90 MICROGram(s) IV Push <User Schedule>  potassium chloride  10 mEq/100 mL IVPB 10 milliEquivalent(s) IV Intermittent every 1 hour  povidone iodine 10% Solution 1 Application(s) Topical once  simvastatin 20 milliGRAM(s) Oral at bedtime    MEDICATIONS  (PRN):  dextrose 40% Gel 15 Gram(s) Oral once PRN Blood Glucose LESS THAN 70 milliGRAM(s)/deciliter  glucagon  Injectable 1 milliGRAM(s) IntraMuscular once PRN Glucose LESS THAN 70 milligrams/deciliter    .  LABS:                         8.6    20.28 )-----------( 191      ( 08 Jan 2020 05:52 )             28.1     01-08    153<H>  |  123<H>  |  54<H>  ----------------------------<  266<H>  3.6   |  19<L>  |  1.46<H>    Ca    8.7      08 Jan 2020 05:52  Phos  2.7     01-07  Mg     2.1     01-08      PT/INR - ( 07 Jan 2020 05:53 )   PT: 14.1 sec;   INR: 1.23          PTT - ( 07 Jan 2020 05:53 )  PTT:31.0 sec              RADIOLOGY, EKG & ADDITIONAL TESTS: Reviewed.     RADIOLOGY & ADDITIONAL TESTS: Reviewed.    ASSESSMENT:    PLAN:

## 2020-01-08 NOTE — CONSULT NOTE ADULT - SUBJECTIVE AND OBJECTIVE BOX
HISTORY OF PRESENT ILLNESS:  HPI:  89M PMH DM, HTN, HLD, surgical hx of uncertain vascular procedure in left leg (presumed arthrectomy or angioplasty) presents with generalized weakness. The patient is not able to speak when questioned during the encounter and the daughter at bedside is able to provide the history. As per the daughter, the patient was noted to be diagnosed with pneumonia 2 months ago and was treated with antibiotics as an outpatient. Since then, the patient has been having a progressive productive cough with yellow sputum. He has been having difficulty eating in which she notes that he coughs up his food upon swallowing. This past month he has been feeling progressively weaker and today had trouble moving from the car to the wheelchair which prompted his ER visit. Of note, the daughter notes that 3 months ago, the patient had a stent recently placed in the left leg for peripheral vascular disease. The patient is not able to give a history given his mental status and review of systems cannot be assessed.    ED Course: Vitals: T 100.6F HR 83 BP 97/53 RR 18 O2 96% 1L NC  Chest xray: Right effusion. Small left effusion cannot be excluded.   Labs: Wbc 36.83 Hb; 11.8 Plt 262 Cl 110 CO2 19 BUN/Cr 61/1.96 Glucose 413 Lactate 3.3 elevated alk phos and LFTs, trops 0.23 UA neg, RSV neg  Given Vanc 1g, zosyn 3.375g, 1L NS Bolus, acetaminophen 650mg suppository, insulin regular 4mg IVP, blood and urine cultures drawn (05 Jan 2020 17:25)      Interval HPI - no cp sob palp, + dizziness, rest as per above hpi    PAST MEDICAL & SURGICAL HISTORY:  PVD (peripheral vascular disease)  CAD (coronary artery disease)  Hyperlipidemia  Hypertension  Diabetes  PVD (peripheral vascular disease): s/p stent placement in left leg 3 months ago        [ ] Diabetes   [ ] Hypertension  [ ] Hyperlipidemia  [ ] CAD  [ ] PCI  [ ] CABG    PREVIOUS DIAGNOSTIC TESTING:    [ ] Echocardiogram:  [ ]  Catheterization:  [ ] Stress Test:  	    MEDICATIONS:    ampicillin/sulbactam  IVPB 3 Gram(s) IV Intermittent every 12 hours          dextrose 40% Gel 15 Gram(s) Oral once PRN  dextrose 50% Injectable 12.5 Gram(s) IV Push once  dextrose 50% Injectable 25 Gram(s) IV Push once  dextrose 50% Injectable 25 Gram(s) IV Push once  glucagon  Injectable 1 milliGRAM(s) IntraMuscular once PRN  insulin glargine Injectable (LANTUS) 10 Unit(s) SubCutaneous at bedtime  insulin lispro (HumaLOG) corrective regimen sliding scale   SubCutaneous Before meals and at bedtime  levothyroxine Injectable 90 MICROGram(s) IV Push <User Schedule>  simvastatin 20 milliGRAM(s) Oral at bedtime    clopidogrel Tablet 75 milliGRAM(s) Oral daily  dextrose 5% + sodium chloride 0.45%. 1000 milliLiter(s) IV Continuous <Continuous>  dextrose 5%. 1000 milliLiter(s) IV Continuous <Continuous>  heparin  Injectable 5000 Unit(s) SubCutaneous every 12 hours  povidone iodine 10% Solution 1 Application(s) Topical once          SOCIAL HISTORY:    [ x] Non-smoker  [ ] Smoker  [ ] Alcohol    FAMILY HX: FAMILY HISTORY:    no cad in first degree relatives    Allergies    No Known Allergies    Intolerances    	    REVIEW OF SYSTEMS:    [x] as per HPI  CONSTITUTIONAL: No fever, weight loss, or fatigue  ENT:  No difficulty hearing, tinnitus, vertigo; No sinus or throat pain  RESPIRATORY: No cough, wheezing, chills or hemoptysis; No Shortness of Breath  CARDIOVASCULAR: No chest pain, palpitations, dizziness, or leg swelling  GASTROINTESTINAL: No abdominal or epigastric pain. No nausea, vomiting, or hematemesis; No diarrhea or constipation. No melena or hematochezia.  GENITOURINARY: No dysuria, frequency, hematuria, or incontinence  NEUROLOGICAL: No headaches, memory loss, loss of strength, numbness, or tremors  MUSCULOSKELETAL: No joint pain or swelling; No muscle, back, or extremity pain  [x] All others negative	  [ ] Unable to obtain    PHYSICAL EXAM:  T(C): 36.8 (01-08-20 @ 05:31), Max: 37.2 (01-07-20 @ 13:40)  HR: 68 (01-08-20 @ 05:31) (65 - 72)  BP: 103/64 (01-08-20 @ 05:31) (102/64 - 108/63)  RR: 18 (01-08-20 @ 05:31) (16 - 18)  SpO2: 99% (01-08-20 @ 05:31) (93% - 100%)  Wt(kg): --  I&O's Summary    07 Jan 2020 07:01  -  08 Jan 2020 07:00  --------------------------------------------------------  IN: 600 mL / OUT: 1000 mL / NET: -400 mL        Appearance: Normal	  HEENT:   Normal oral mucosa, PERRL, EOMI	  Lymphatic: No lymphadenopathy  Cardiovascular: Normal S1 S2, No JVD, No murmurs, No edema  Respiratory: Lungs clear to auscultation	  Psychiatry: A & O x 3, Mood & affect appropriate  Gastrointestinal:  Soft, Non-tender, + BS	  Skin: No rashes, No ecchymoses, No cyanosis	  Neurologic: Non-focal  Extremities: Normal range of motion, No clubbing, cyanosis or edema  Vascular: Peripheral pulses palpable 2+ bilaterally    TELEMETRY: 	    ECG:    ECHO:  STRESS:  CATH:  	  RADIOLOGY:  CXR:  CT:  US:   	  	  LABS:	 	    CARDIAC MARKERS:                                  8.6    20.28 )-----------( 191      ( 08 Jan 2020 05:52 )             28.1     01-08    153<H>  |  123<H>  |  54<H>  ----------------------------<  266<H>  3.6   |  19<L>  |  1.46<H>    Ca    8.7      08 Jan 2020 05:52  Phos  2.7     01-07  Mg     2.1     01-08      proBNP:   Lipid Profile:   HgA1c:   TSH:     ASSESSMENT/PLAN: 	    PVD (peripheral vascular disease).  Plan: His 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. Although there are some areas of erythema that could reflect mild cellulitis, his sepsis is likely due to an underlying pulmonary etiology  - vascular surgery consulted  - no surgical intervention required at this time  - wound care: apply betadine to areas of gangrene/necrosis, wrap the feet with kerlex.        CAD (coronary artery disease).  Plan: history of CAD  - takes plavix 75mg daily at home.       Hyperlipidemia. Plan: history of hyperlipidemia  - continue with home simvastatin 20mg daily     Hypertension.  Plan: - past medical history of hypertension  - takes lisinopril/HCTZ 10/12.5mg and coreg 6.25mg BID at home  - holding home antihypertensives in the setting of sepsis.

## 2020-01-08 NOTE — PROGRESS NOTE ADULT - PROBLEM SELECTOR PLAN 1
Pt continuing to improve  - c/w unasyn 3g q12 hours per ID  - ID following  - S+S, FEES exam found dysphagia  - GI Dr. Wright did not feel pt good canditate for PEG    - pulm recommended CT chest    #Aspiration  -S+S found severe dysphagia, would need PEG or palliative  -pt to be NPO during time, and on D5 100 cc/hr (as pt became hypernatremic and hyperchloremic on 1/2 NS) for maintenance fluids

## 2020-01-08 NOTE — PROGRESS NOTE ADULT - PROBLEM SELECTOR PLAN 7
hx DM2. holding home doses of januvia 50mg and nateglinide 60mg   - ISS  -c/w lantus 10 U  -on D5+1/2 NS 75cc/hr for nutrition while NPO  -monitor sugars

## 2020-01-08 NOTE — PROGRESS NOTE ADULT - SUBJECTIVE AND OBJECTIVE BOX
CC/ HPI Patient is a 89 year old male with CAD, HTN, PVD, LLE vascular procedure admitted with severe sepsis secondary to aspiration pneumonia, seen this morning the patient denies acute respiratory complaint.    PAST MEDICAL & SURGICAL HISTORY:  PVD (peripheral vascular disease)  CAD (coronary artery disease)  Hyperlipidemia  Hypertension  Diabetes  PVD (peripheral vascular disease): s/p stent placement in left leg 3 months ago    SOCHX:   -  alcohol    FMHX: FA/MO  - contributory     ROS reviewed below with positive findings marked (+) :  GEN:  fever, chills ENT: tracheostomy,   epistaxis,  sinusitis COR: +CAD, CHF,  +HTN, dysrhythmia PUL: COPD, ILD, asthma, pneumonia GI: PEG, dysphagia, hemorrhage, other MANDO: kidney disease, electrolyte disorder HEM:  anemia, thrombus, coagulopathy, cancer ENDO:  thyroid disease,+ diabetes mellitus CNS:  dementia, stroke, seizure, PSY:  depression, anxiety, other        MEDICATIONS  (STANDING):  ampicillin/sulbactam  IVPB 3 Gram(s) IV Intermittent every 12 hours  clopidogrel Tablet 75 milliGRAM(s) Oral daily  dextrose 5%. 1000 milliLiter(s) (100 mL/Hr) IV Continuous <Continuous>  dextrose 5%. 1000 milliLiter(s) (50 mL/Hr) IV Continuous <Continuous>  dextrose 50% Injectable 12.5 Gram(s) IV Push once  dextrose 50% Injectable 25 Gram(s) IV Push once  dextrose 50% Injectable 25 Gram(s) IV Push once  heparin  Injectable 5000 Unit(s) SubCutaneous every 12 hours  insulin glargine Injectable (LANTUS) 10 Unit(s) SubCutaneous at bedtime  insulin lispro (HumaLOG) corrective regimen sliding scale   SubCutaneous Before meals and at bedtime  levothyroxine Injectable 90 MICROGram(s) IV Push <User Schedule>  povidone iodine 10% Solution 1 Application(s) Topical once  simvastatin 20 milliGRAM(s) Oral at bedtime    MEDICATIONS  (PRN):  dextrose 40% Gel 15 Gram(s) Oral once PRN Blood Glucose LESS THAN 70 milliGRAM(s)/deciliter  glucagon  Injectable 1 milliGRAM(s) IntraMuscular once PRN Glucose LESS THAN 70 milligrams/deciliter      Vital Signs Last 24 Hrs  T(C): 37.1 (08 Jan 2020 13:02), Max: 37.1 (08 Jan 2020 13:02)  T(F): 98.7 (08 Jan 2020 13:02), Max: 98.7 (08 Jan 2020 13:02)  HR: 72 (08 Jan 2020 13:02) (65 - 72)  BP: 86/47 (08 Jan 2020 13:02) (86/47 - 103/64)  RR: 18 (08 Jan 2020 13:02) (16 - 18)  SpO2: 93% (08 Jan 2020 13:02) (93% - 100%)    GENERAL:         comfortable,  - distress.  HEENT:            - trauma,  - icterus,  - injection,  - nasal discharge.  NECK:              - jugular venous distention, - thyromegaly.  LYMPH:           - lymphadenopathy, - masses.  RESP:               + crackles,   - rhonchi,   - wheezes.   COR:                S1S2   - gallops,  - rubs.  ABD:                bowel sounds,   soft, - tender, - distended.  EXT/MSC:         - cyanosis,  - clubbing, - edema.    NEURO:           + alert and oriented                             8.6    20.28 )-----------( 191      ( 08 Jan 2020 05:52 )             28.1     01-08    153<H>  |  123<H>  |  54<H>  ----------------------------<  266<H>  3.6   |  19<L>  |  1.46<H>        X-ray Chest (01.06.20)  Lung pathology, worsened. Heart size within normal limits, thoracic calcification. Stable bony structures.    X-ray Chest  (01.05.20)  Congestion and/or infiltrates. Right effusion. Small left effusion cannot be excluded        ASSESSMENT/PLAN    1) Pneumonia  2) Pleural effusion  3) CVD/HTN    Oxygen as needed for a satisfactory SpO2  CT scan of the chest  Bronchodilators:  Atrovent/ albuterol q 4 – 6 hours as needed  ID/Antibiotics:  ampicillin/sulbactam  Cardiac/HTN:  hemodynamically stable  GI: Rx/ prophylaxis c PPI/H2B  Heme: Rx/VT prophylaxis  Discussed with medical team  Discussed with Dr. Monsalve.

## 2020-01-08 NOTE — CONSULT NOTE ADULT - REASON FOR ADMISSION
sepsis secondary to aspiration pneumonia
malaise
sepsis secondary to aspiration pneumonia

## 2020-01-08 NOTE — CONSULT NOTE ADULT - SUBJECTIVE AND OBJECTIVE BOX
Pt seen and examined   89M PMH DM, HTN, HLD, surgical hx of uncertain vascular procedure in left leg (presumed arthrectomy or angioplasty) presents with generalized weakness admitted with severe sepsis (bp 85/43, leukocytosis, acute respiratory failure) secondary to aspiration pneumonia now on unasyn for anaerobic coverage called to evaluate for gastrostomy as he failed speech and swallow      REVIEW OF SYSTEMS:  unable  however responds to questions and answers appropriately when asked in French ? or Yiddish  by Jaime aFye      MEDICATIONS:  MEDICATIONS  (STANDING):  ampicillin/sulbactam  IVPB 3 Gram(s) IV Intermittent every 12 hours  clopidogrel Tablet 75 milliGRAM(s) Oral daily  dextrose 5%. 1000 milliLiter(s) (100 mL/Hr) IV Continuous <Continuous>  dextrose 5%. 1000 milliLiter(s) (50 mL/Hr) IV Continuous <Continuous>  dextrose 50% Injectable 12.5 Gram(s) IV Push once  dextrose 50% Injectable 25 Gram(s) IV Push once  dextrose 50% Injectable 25 Gram(s) IV Push once  heparin  Injectable 5000 Unit(s) SubCutaneous every 12 hours  insulin glargine Injectable (LANTUS) 10 Unit(s) SubCutaneous at bedtime  insulin lispro (HumaLOG) corrective regimen sliding scale   SubCutaneous Before meals and at bedtime  levothyroxine Injectable 90 MICROGram(s) IV Push <User Schedule>  povidone iodine 10% Solution 1 Application(s) Topical once  simvastatin 20 milliGRAM(s) Oral at bedtime    MEDICATIONS  (PRN):  dextrose 40% Gel 15 Gram(s) Oral once PRN Blood Glucose LESS THAN 70 milliGRAM(s)/deciliter  glucagon  Injectable 1 milliGRAM(s) IntraMuscular once PRN Glucose LESS THAN 70 milligrams/deciliter      Allergies    No Known Allergies    Intolerances        Vital Signs Last 24 Hrs  T(C): 37.1 (08 Jan 2020 13:02), Max: 37.1 (08 Jan 2020 13:02)  T(F): 98.7 (08 Jan 2020 13:02), Max: 98.7 (08 Jan 2020 13:02)  HR: 72 (08 Jan 2020 13:02) (65 - 72)  BP: 86/47 (08 Jan 2020 13:02) (86/47 - 103/64)  BP(mean): --  RR: 18 (08 Jan 2020 13:02) (16 - 18)  SpO2: 93% (08 Jan 2020 13:02) (93% - 100%)    01-07 @ 07:01  -  01-08 @ 07:00  --------------------------------------------------------  IN: 600 mL / OUT: 1000 mL / NET: -400 mL        PHYSICAL EXAM:    General: thin  in no acute distress awake and responds verbally  HEENT: MMM, conjunctiva and sclera clear, nasal cannula  Gastrointestinal: Soft non-tender non-distended; Normal bowel sounds;   Skin: Warm and dry. No obvious rash    LABS:      CBC Full  -  ( 08 Jan 2020 05:52 )  WBC Count : 20.28 K/uL  RBC Count : 3.33 M/uL  Hemoglobin : 8.6 g/dL  Hematocrit : 28.1 %  Platelet Count - Automated : 191 K/uL  Mean Cell Volume : 84.4 fl  Mean Cell Hemoglobin : 25.8 pg  Mean Cell Hemoglobin Concentration : 30.6 gm/dL  Auto Neutrophil # : x  Auto Lymphocyte # : x  Auto Monocyte # : x  Auto Eosinophil # : x  Auto Basophil # : x  Auto Neutrophil % : x  Auto Lymphocyte % : x  Auto Monocyte % : x  Auto Eosinophil % : x  Auto Basophil % : x    01-08    149<H>  |  121<H>  |  54<H>  ----------------------------<  169<H>  3.8   |  18<L>  |  1.49<H>    Ca    8.5      08 Jan 2020 16:38  Phos  2.7     01-07  Mg     2.1     01-08      PT/INR - ( 07 Jan 2020 05:53 )   PT: 14.1 sec;   INR: 1.23          PTT - ( 07 Jan 2020 05:53 )  PTT:31.0 sec                  RADIOLOGY & ADDITIONAL STUDIES (The following images were personally reviewed):

## 2020-01-08 NOTE — CONSULT NOTE ADULT - PROVIDER SPECIALTY LIST ADULT
Critical Care
Cardiology
Neurology
Vascular Surgery
Gastroenterology
Infectious Disease
Rehab Medicine

## 2020-01-08 NOTE — PROGRESS NOTE ADULT - ASSESSMENT
89M PMH DM, HTN, HLD, surgical hx of uncertain vascular procedure in left leg (presumed arthrectomy or angioplasty) presents with generalized weakness admitted with severe sepsis (bp 85/43, leukocytosis, acute respiratory failure) secondary to aspiration pneumonia now on unasyn for anaerobic coverage

## 2020-01-08 NOTE — CONSULT NOTE ADULT - SUBJECTIVE AND OBJECTIVE BOX
Patient is a 89y old  Male who presents with a chief complaint of sepsis secondary to aspiration pneumonia (07 Jan 2020 19:43)        HPI:  89M PMH DM, HTN, HLD, surgical hx of uncertain vascular procedure in left leg (presumed arthrectomy or angioplasty) presents with generalized weakness. The patient is not able to speak when questioned during the encounter and the daughter at bedside is able to provide the history. As per the daughter, the patient was noted to be diagnosed with pneumonia 2 months ago and was treated with antibiotics as an outpatient. Since then, the patient has been having a progressive productive cough with yellow sputum. He has been having difficulty eating in which she notes that he coughs up his food upon swallowing. This past month he has been feeling progressively weaker and today had trouble moving from the car to the wheelchair which prompted his ER visit. Of note, the daughter notes that 3 months ago, the patient had a stent recently placed in the left leg for peripheral vascular disease. The patient is not able to give a history given his mental status and review of systems cannot be assessed.    ED Course: Vitals: T 100.6F HR 83 BP 97/53 RR 18 O2 96% 1L NC  Chest xray: Right effusion. Small left effusion cannot be excluded.   Labs: Wbc 36.83 Hb; 11.8 Plt 262 Cl 110 CO2 19 BUN/Cr 61/1.96 Glucose 413 Lactate 3.3 elevated alk phos and LFTs, trops 0.23 UA neg, RSV neg  Given Vanc 1g, zosyn 3.375g, 1L NS Bolus, acetaminophen 650mg suppository, insulin regular 4mg IVP, blood and urine cultures drawn (05 Jan 2020 17:25)  altered mental status - no seizures - no new weakness in the UE- lower extremity     Allergies  No Known Allergies      Health Issues  SEPSIS  No pertinent family history in first degree relatives  Handoff  MEWS Score  PVD (peripheral vascular disease)  CAD (coronary artery disease)  Hyperlipidemia  Hypertension  Diabetes  Sepsis  Transaminitis  Severe sepsis  Transition of care performed with sharing of clinical summary  Prophylactic measure  Diabetes  Hypertension  Hyperlipidemia  CAD (coronary artery disease)  Aspiration pneumonia  PVD (peripheral vascular disease)  PVD (peripheral vascular disease)  No significant past surgical history  No significant past surgical history  No significant past surgical history  WEAKNESS        FAMILY HISTORY:      MEDICATIONS  (STANDING):  ampicillin/sulbactam  IVPB 3 Gram(s) IV Intermittent every 12 hours  clopidogrel Tablet 75 milliGRAM(s) Oral daily  dextrose 5% + sodium chloride 0.45%. 1000 milliLiter(s) (75 mL/Hr) IV Continuous <Continuous>  dextrose 5%. 1000 milliLiter(s) (50 mL/Hr) IV Continuous <Continuous>  dextrose 50% Injectable 12.5 Gram(s) IV Push once  dextrose 50% Injectable 25 Gram(s) IV Push once  dextrose 50% Injectable 25 Gram(s) IV Push once  heparin  Injectable 5000 Unit(s) SubCutaneous every 12 hours  insulin glargine Injectable (LANTUS) 10 Unit(s) SubCutaneous at bedtime  insulin lispro (HumaLOG) corrective regimen sliding scale   SubCutaneous Before meals and at bedtime  levothyroxine Injectable 90 MICROGram(s) IV Push <User Schedule>  povidone iodine 10% Solution 1 Application(s) Topical once  simvastatin 20 milliGRAM(s) Oral at bedtime    MEDICATIONS  (PRN):  dextrose 40% Gel 15 Gram(s) Oral once PRN Blood Glucose LESS THAN 70 milliGRAM(s)/deciliter  glucagon  Injectable 1 milliGRAM(s) IntraMuscular once PRN Glucose LESS THAN 70 milligrams/deciliter      PAST MEDICAL & SURGICAL HISTORY:  PVD (peripheral vascular disease)  CAD (coronary artery disease)  Hyperlipidemia  Hypertension  Diabetes  PVD (peripheral vascular disease): s/p stent placement in left leg 3 months ago      Labs                          8.6    20.28 )-----------( 191      ( 08 Jan 2020 05:52 )             28.1     01-08    153<H>  |  123<H>  |  54<H>  ----------------------------<  266<H>  3.6   |  19<L>  |  1.46<H>    Ca    8.7      08 Jan 2020 05:52  Phos  2.7     01-07  Mg     2.1     01-08        Radiology:    Physical Exam    MENTAL STATUS  -Level of Consciousness- lethargic    poorly response to commands       Cranial Nerve 1- 12  Pupils- equal and reactive  Eye movements- dolls eyes   Facial - no asymmetry       Gait and Station- n/a    MOTOR  Upper- severe diffuse weakness  Lower- diffuse weakness    Reflexes- decreased    Sensation n/a    Cerebellar- no abnormal movements     vascular - no bruits    Assessment- Toxic metabolic encephalopathy    Plan  CT head - no contrast   Rx multivitamins

## 2020-01-09 LAB
ANION GAP SERPL CALC-SCNC: 10 MMOL/L — SIGNIFICANT CHANGE UP (ref 5–17)
BUN SERPL-MCNC: 53 MG/DL — HIGH (ref 7–23)
CALCIUM SERPL-MCNC: 8.2 MG/DL — LOW (ref 8.4–10.5)
CHLORIDE SERPL-SCNC: 123 MMOL/L — HIGH (ref 96–108)
CO2 SERPL-SCNC: 18 MMOL/L — LOW (ref 22–31)
CREAT SERPL-MCNC: 1.42 MG/DL — HIGH (ref 0.5–1.3)
GLUCOSE BLDC GLUCOMTR-MCNC: 228 MG/DL — HIGH (ref 70–99)
GLUCOSE BLDC GLUCOMTR-MCNC: 243 MG/DL — HIGH (ref 70–99)
GLUCOSE BLDC GLUCOMTR-MCNC: 257 MG/DL — HIGH (ref 70–99)
GLUCOSE BLDC GLUCOMTR-MCNC: 310 MG/DL — HIGH (ref 70–99)
GLUCOSE SERPL-MCNC: 252 MG/DL — HIGH (ref 70–99)
HCT VFR BLD CALC: 31.6 % — LOW (ref 39–50)
HGB BLD-MCNC: 9.5 G/DL — LOW (ref 13–17)
MAGNESIUM SERPL-MCNC: 2.1 MG/DL — SIGNIFICANT CHANGE UP (ref 1.6–2.6)
MCHC RBC-ENTMCNC: 25.9 PG — LOW (ref 27–34)
MCHC RBC-ENTMCNC: 30.1 GM/DL — LOW (ref 32–36)
MCV RBC AUTO: 86.1 FL — SIGNIFICANT CHANGE UP (ref 80–100)
NRBC # BLD: 0 /100 WBCS — SIGNIFICANT CHANGE UP (ref 0–0)
PLATELET # BLD AUTO: 178 K/UL — SIGNIFICANT CHANGE UP (ref 150–400)
POTASSIUM SERPL-MCNC: 4 MMOL/L — SIGNIFICANT CHANGE UP (ref 3.5–5.3)
POTASSIUM SERPL-SCNC: 4 MMOL/L — SIGNIFICANT CHANGE UP (ref 3.5–5.3)
RBC # BLD: 3.67 M/UL — LOW (ref 4.2–5.8)
RBC # FLD: 17.4 % — HIGH (ref 10.3–14.5)
SODIUM SERPL-SCNC: 151 MMOL/L — HIGH (ref 135–145)
WBC # BLD: 21.71 K/UL — HIGH (ref 3.8–10.5)
WBC # FLD AUTO: 21.71 K/UL — HIGH (ref 3.8–10.5)

## 2020-01-09 PROCEDURE — 99232 SBSQ HOSP IP/OBS MODERATE 35: CPT

## 2020-01-09 RX ORDER — PIPERACILLIN AND TAZOBACTAM 4; .5 G/20ML; G/20ML
2.25 INJECTION, POWDER, LYOPHILIZED, FOR SOLUTION INTRAVENOUS EVERY 6 HOURS
Refills: 0 | Status: DISCONTINUED | OUTPATIENT
Start: 2020-01-09 | End: 2020-01-13

## 2020-01-09 RX ORDER — VANCOMYCIN HCL 1 G
500 VIAL (EA) INTRAVENOUS ONCE
Refills: 0 | Status: COMPLETED | OUTPATIENT
Start: 2020-01-09 | End: 2020-01-09

## 2020-01-09 RX ADMIN — HEPARIN SODIUM 5000 UNIT(S): 5000 INJECTION INTRAVENOUS; SUBCUTANEOUS at 06:45

## 2020-01-09 RX ADMIN — Medication 100 MILLIGRAM(S): at 16:41

## 2020-01-09 RX ADMIN — CLOPIDOGREL BISULFATE 75 MILLIGRAM(S): 75 TABLET, FILM COATED ORAL at 13:12

## 2020-01-09 RX ADMIN — Medication 4: at 17:49

## 2020-01-09 RX ADMIN — INSULIN GLARGINE 10 UNIT(S): 100 INJECTION, SOLUTION SUBCUTANEOUS at 22:45

## 2020-01-09 RX ADMIN — Medication 90 MICROGRAM(S): at 06:45

## 2020-01-09 RX ADMIN — Medication 2: at 13:29

## 2020-01-09 RX ADMIN — HEPARIN SODIUM 5000 UNIT(S): 5000 INJECTION INTRAVENOUS; SUBCUTANEOUS at 17:33

## 2020-01-09 RX ADMIN — Medication 3: at 09:23

## 2020-01-09 RX ADMIN — AMPICILLIN SODIUM AND SULBACTAM SODIUM 200 GRAM(S): 250; 125 INJECTION, POWDER, FOR SUSPENSION INTRAMUSCULAR; INTRAVENOUS at 06:45

## 2020-01-09 RX ADMIN — SODIUM CHLORIDE 100 MILLILITER(S): 9 INJECTION, SOLUTION INTRAVENOUS at 17:49

## 2020-01-09 RX ADMIN — SIMVASTATIN 20 MILLIGRAM(S): 20 TABLET, FILM COATED ORAL at 21:48

## 2020-01-09 RX ADMIN — PIPERACILLIN AND TAZOBACTAM 200 GRAM(S): 4; .5 INJECTION, POWDER, LYOPHILIZED, FOR SOLUTION INTRAVENOUS at 17:48

## 2020-01-09 RX ADMIN — Medication 2: at 22:46

## 2020-01-09 NOTE — PROGRESS NOTE ADULT - PROBLEM SELECTOR PLAN 8
F: 1/2 NS + D5 75cc/hr  E: Replete K<4, Mg<2  N: NPO  A: Plavix 75mg F: D5 100cc/hr  E: Replete K<4, Mg<2  N: NPO  A: Plavix 75mg

## 2020-01-09 NOTE — PROGRESS NOTE ADULT - SUBJECTIVE AND OBJECTIVE BOX
CC/ HPI Patient is a 89 year old male with CAD, HTN, PVD, LLE vascular procedure admitted with severe sepsis secondary to aspiration pneumonia, seen this morning the patient denies acute respiratory complaint.    PAST MEDICAL & SURGICAL HISTORY:  PVD (peripheral vascular disease)  CAD (coronary artery disease)  Hyperlipidemia  Hypertension  Diabetes  PVD (peripheral vascular disease): s/p stent placement in left leg 3 months ago    SOCHX:   -  alcohol    FMHX: FA/MO  - contributory     ROS reviewed below with positive findings marked (+) :  GEN:  fever, chills ENT: tracheostomy,   epistaxis,  sinusitis COR: +CAD, CHF,  +HTN, dysrhythmia PUL: COPD, ILD, asthma, pneumonia GI: PEG, dysphagia, hemorrhage, other MANDO: kidney disease, electrolyte disorder HEM:  anemia, thrombus, coagulopathy, cancer ENDO:  thyroid disease,+ diabetes mellitus CNS:  dementia, stroke, seizure, PSY:  depression, anxiety, other        MEDICATIONS  (STANDING):  ampicillin/sulbactam  IVPB 3 Gram(s) IV Intermittent every 12 hours  clopidogrel Tablet 75 milliGRAM(s) Oral daily  dextrose 5%. 1000 milliLiter(s) (100 mL/Hr) IV Continuous <Continuous>  dextrose 5%. 1000 milliLiter(s) (50 mL/Hr) IV Continuous <Continuous>  dextrose 50% Injectable 12.5 Gram(s) IV Push once  dextrose 50% Injectable 25 Gram(s) IV Push once  dextrose 50% Injectable 25 Gram(s) IV Push once  heparin  Injectable 5000 Unit(s) SubCutaneous every 12 hours  insulin glargine Injectable (LANTUS) 10 Unit(s) SubCutaneous at bedtime  insulin lispro (HumaLOG) corrective regimen sliding scale   SubCutaneous Before meals and at bedtime  levothyroxine Injectable 90 MICROGram(s) IV Push <User Schedule>  povidone iodine 10% Solution 1 Application(s) Topical once  simvastatin 20 milliGRAM(s) Oral at bedtime    MEDICATIONS  (PRN):  dextrose 40% Gel 15 Gram(s) Oral once PRN Blood Glucose LESS THAN 70 milliGRAM(s)/deciliter  glucagon  Injectable 1 milliGRAM(s) IntraMuscular once PRN Glucose LESS THAN 70 milligrams/deciliter      Vital Signs Last 24 Hrs  T(C): 37.1 (08 Jan 2020 13:02), Max: 37.1 (08 Jan 2020 13:02)  T(F): 98.7 (08 Jan 2020 13:02), Max: 98.7 (08 Jan 2020 13:02)  HR: 72 (08 Jan 2020 13:02) (65 - 72)  BP: 86/47 (08 Jan 2020 13:02) (86/47 - 103/64)  RR: 18 (08 Jan 2020 13:02) (16 - 18)  SpO2: 93% (08 Jan 2020 13:02) (93% - 100%)    GENERAL:         comfortable,  - distress.  HEENT:            - trauma,  - icterus,  - injection,  - nasal discharge.  NECK:              - jugular venous distention, - thyromegaly.  LYMPH:           - lymphadenopathy, - masses.  RESP:               + crackles,   - rhonchi,   - wheezes.   COR:                S1S2   - gallops,  - rubs.  ABD:                bowel sounds,   soft, - tender, - distended.  EXT/MSC:         - cyanosis,  - clubbing, - edema.    NEURO:           + alert and oriented                             8.6    20.28 )-----------( 191      ( 08 Jan 2020 05:52 ) 21 K             28.1     01-08    153<H>  |  123<H>  |  54<H>  ----------------------------<  266<H>  3.6   |  19<L>  |  1.46<H>    X-ray Chest (01.06.20)  Lung pathology, worsened. Heart size within normal limits, thoracic calcification. Stable bony structures.    X-ray Chest  (01.05.20)  Congestion and/or infiltrates. Right effusion. Small left effusion cannot be excluded      INTERPRETATION:  CT OF THE CHEST without contrast dated 1/8/2020 3:30 PM    INDICATION: Aspiration pneumonia secondary to dysphagia. Bilateral rhonchi on exam.    TECHNIQUE: CT of the chest was performed without intravenous contrast.  Intravenous contrast was not used , as requested. Axial and coronal images were produced and reviewed.    PRIOR STUDIES: None.    FINDINGS:   The heart is normal in size. Aortic valve calcifications. No pericardial effusion is seen.      Evaluation of the vasculature is limited without intravenous contrast, but there is no evidence of thoracic aortic aneurysm.  Severe atherosclerotic calcifications throughout the thoracic aorta. 4 cmproximal descending thoracic aortic aneurysm.    Evaluation for adenopathy is limited without intravenous contrast. Within that limitation, no mediastinal or hilar lymphadenopathy is seen.    8.8 x 5.9 x 5.2 cm left lower lobe ovoid area of air/fluidwith surrounding consolidation. Large bilateral pleural effusions right greater than left. Areas of nodular pleural thickening in the left lung base consistent with empyema versus malignant pleural effusion. Near complete left lower lobe and completeright lower lobe collapse. There are areas of low attenuation within the right lower lobe collapse consistent with pulmonary nodules versus pneumonia. Small groundglass infiltrates in the upper lobes right greater than left. Punctate calcified granuloma in the right upper lobe. A few bilateral pulmonary micronodules. Biapical pleural thickening, left greater than right with left apical pleural calcifications.    Limited evaluation of the upper abdomen demonstrates no significant abnormality.    Evaluation of the osseous structures demonstrates severe degenerative changes throughout the spine. Mild scoliosis. Respiratory motion artifacts obscuring detail. There is calcification noted superior to the right humeral head which may represent calcific bursitis, tendinitis, and/or chondrocalcinosis.    IMPRESSION:    8.8 x 5.9 x 5.2 cm left lower lobe ovoid area of air/fluid with surrounding consolidation compatible with a pulmonary abscess versus necrotizing pneumonia. Aspiration pneumonia, pulmonary infarction or necrotic neoplasm are included in the differential diagnosis.    Large bilateral pleural effusions right greater than left. Areas of nodular pleural thickening in the left lung base consistent with empyema versus malignant pleural effusion. Near complete left lower lobe and complete right lower lobe collapse. There are areas of low attenuation within the right lower lobe collapse consistent with pulmonary nodules versus pneumonia. Small groundglass infiltrates in the upper lobesright greater than left.     Aortic valve calcifications which may be associated with aortic valve stenosis, echocardiogram is recommended.     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      INTERPRETATION:  PROCEDURE: CT head without intravenous contrast.    INDICATION: Toxic encephalopathy    TECHNIQUE: Multiple axial sections were obtained at 5 mm intervals. The images were reviewed in brain and bone windows. Sagittal and coronal reformations are provided.    COMPARISON: None    FINDINGS:    There is moderate diffuse parenchymal volume loss, though likely age-appropriate, without evidence of hydrocephalus, mass effect or midline shift. There is no extra-axial fluid collection or acute intracranial hemorrhage.    There is confluent hypoattenuation throughout periventricular and scattered subcortical cerebral white matter compatible with small vesselischemic change. Chronic lacunar infarction involves the genu of the right internal capsule and additional round hypoattenuation in the right caudal putamen could be additional lacunar or dilated perivascular space.    Bone window images demonstrate no calvarial fracture or destructive lesion. Mastoid air cells are clear. There is scattered mucosal thickening in the ethmoid air cells. There is a somewhat radiodense and rounded soft tissue focus in the right anterior nasal cavity for which direct inspection is suggested. There is no overt bone destruction of the lesion is likely benign, perhaps a polyp despite its somewhat dense appearance. There is partially imaged abundant fatty tissue, likely lipoma, in the posterior neck. The native ocularlenses are absent.      IMPRESSION:     1. No acute intracranial abnormality on CT. There is no acute intracranial hemorrhage or mass effect.    2. There is age-related parenchymal volume loss as well as moderate small vessel ischemic change.    3. Round masslike lesion in the right anterior nasal cavity for which direct inspection is advised.    ASSESSMENT/PLAN    1) Pneumonia  2) Pleural effusions  3) CVD/HTN  4) Nasal abnormality    Oxygen as needed for a satisfactory SpO2  CT scan of the chest reviewed  Bronchodilators:  Atrovent/ albuterol q 4 – 6 hours as needed  ID/Antibiotics: vanco, pip/tazo  Cardiac/HTN:  hemodynamically stable  GI: Rx/ prophylaxis c PPI/H2B  Heme: Rx/VT prophylaxis  Discussed with medical team  Discussed with Dr. Augustin, bedside aid

## 2020-01-09 NOTE — PROGRESS NOTE ADULT - PROBLEM SELECTOR PLAN 1
Pt continuing to improve  - c/w unasyn 3g q12 hours per ID  - ID following  - S+S, FEES exam found dysphagia  - GI Dr. Wright did not feel pt good canditate for PEG    - pulm recommended CT chest, which found CT chest found a large air/fluid ovoid area compatible with pulmonary abscess. Found large bilateral pleural effusions R >L. 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     #Aspiration  -S+S found severe dysphagia, would need PEG or palliative  -pt to be NPO during time, and on D5 100 cc/hr (as pt became hypernatremic and hyperchloremic on 1/2 NS) for maintenance fluids Pt continuing to improve  - stop unasyn 3g q12 hours  - start vancomycin 500 mg with vanc trough tomorrow at 2 pm on 01/10   - start zosyn 2.25 mg IV q 6 hrs  - ID following  - S+S, FEES exam found dysphagia  - GI Dr. Wright did not feel pt good canditate for PEG    - pulm recommended CT chest, which found CT chest found a large air/fluid ovoid area compatible with pulmonary abscess. Found large bilateral pleural effusions R >L. 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     #Aspiration  -S+S found severe dysphagia, would need PEG or palliative  -pt to be NPO during time, and on D5 100 cc/hr (as pt became hypernatremic and hyperchloremic on 1/2 NS) for maintenance fluids

## 2020-01-09 NOTE — PROGRESS NOTE ADULT - SUBJECTIVE AND OBJECTIVE BOX
INTERVAL HPI/OVERNIGHT EVENTS:        MEDICATIONS  (STANDING):  ampicillin/sulbactam  IVPB 3 Gram(s) IV Intermittent every 12 hours  clopidogrel Tablet 75 milliGRAM(s) Oral daily  dextrose 5%. 1000 milliLiter(s) (100 mL/Hr) IV Continuous <Continuous>  dextrose 5%. 1000 milliLiter(s) (50 mL/Hr) IV Continuous <Continuous>  dextrose 50% Injectable 12.5 Gram(s) IV Push once  dextrose 50% Injectable 25 Gram(s) IV Push once  dextrose 50% Injectable 25 Gram(s) IV Push once  heparin  Injectable 5000 Unit(s) SubCutaneous every 12 hours  insulin glargine Injectable (LANTUS) 10 Unit(s) SubCutaneous at bedtime  insulin lispro (HumaLOG) corrective regimen sliding scale   SubCutaneous Before meals and at bedtime  levothyroxine Injectable 90 MICROGram(s) IV Push <User Schedule>  povidone iodine 10% Solution 1 Application(s) Topical once  simvastatin 20 milliGRAM(s) Oral at bedtime    MEDICATIONS  (PRN):  dextrose 40% Gel 15 Gram(s) Oral once PRN Blood Glucose LESS THAN 70 milliGRAM(s)/deciliter  glucagon  Injectable 1 milliGRAM(s) IntraMuscular once PRN Glucose LESS THAN 70 milligrams/deciliter      Allergies    No Known Allergies    EXAM  Vital Signs Last 24 Hrs  T(C): 37 (09 Jan 2020 05:39), Max: 37.1 (08 Jan 2020 13:02)  T(F): 98.6 (09 Jan 2020 05:39), Max: 98.7 (08 Jan 2020 13:02)  HR: 61 (09 Jan 2020 09:16) (61 - 84)  BP: 107/66 (09 Jan 2020 05:39) (86/47 - 108/73)  BP(mean): --  RR: 20 (09 Jan 2020 09:16) (18 - 20)  SpO2: 94% (09 Jan 2020 09:16) (93% - 97%)          LABS:                        9.5    21.71 )-----------( 178      ( 09 Jan 2020 06:41 )             31.6     01-09    151<H>  |  123<H>  |  53<H>  ----------------------------<  252<H>  4.0   |  18<L>  |  1.42<H>    Ca    8.2<L>      09 Jan 2020 06:41  Mg     2.1     01-09            MICROBIOLOGY:    Culture - Blood (01.05.20 @ 13:10)    Specimen Source: .Blood Blood    Culture Results:   No growth at 3 days.    Culture - Blood (01.05.20 @ 13:10)    Specimen Source: .Blood Blood    Culture Results:   No growth at 3 days.        RADIOLOGY & ADDITIONAL STUDIES:    CT Chest No Cont (01.08.20 @ 15:30) >  EXAM:  CT CHEST                          PROCEDURE DATE:  01/08/2020          INTERPRETATION:  CT OF THE CHEST without contrast dated 1/8/2020 3:30 PM    INDICATION: Aspiration pneumonia secondary to dysphagia. Bilateral rhonchi on exam.    TECHNIQUE: CT of the chest was performed without intravenous contrast.  Intravenous contrast was not used , as requested. Axial and coronal images were produced and reviewed.    PRIOR STUDIES: None.    FINDINGS:   The heart is normal in size. Aortic valve calcifications. No pericardial effusion is seen.      Evaluation of the vasculature is limited without intravenous contrast, but there is no evidence of thoracic aortic aneurysm.  Severe atherosclerotic calcifications throughout the thoracic aorta. 4 cmproximal descending thoracic aortic aneurysm.    Evaluation for adenopathy is limited without intravenous contrast. Within that limitation, no mediastinal or hilar lymphadenopathy is seen.    8.8 x 5.9 x 5.2 cm left lower lobe ovoid area of air/fluidwith surrounding consolidation. Large bilateral pleural effusions right greater than left. Areas of nodular pleural thickening in the left lung base consistent with empyema versus malignant pleural effusion. Near complete left lower lobe and completeright lower lobe collapse. There are areas of low attenuation within the right lower lobe collapse consistent with pulmonary nodules versus pneumonia. Small groundglass infiltrates in the upper lobes right greater than left. Punctate calcified granuloma in the right upper lobe. A few bilateral pulmonary micronodules. Biapical pleural thickening, left greater than right with left apical pleural calcifications.    Limited evaluation of the upper abdomen demonstrates no significant abnormality.    Evaluation of the osseous structures demonstrates severe degenerative changes throughout the spine. Mild scoliosis. Respiratory motion artifacts obscuring detail. There is calcification noted superior to the right humeral head which may represent calcific bursitis, tendinitis, and/or chondrocalcinosis.    IMPRESSION:  8.8 x 5.9 x 5.2 cm left lower lobe ovoid area of air/fluid with surrounding consolidation compatible with a pulmonary abscess versus necrotizing pneumonia. Aspiration pneumonia, pulmonary infarction or necrotic neoplasm are included in the differential diagnosis.    Large bilateral pleural effusions right greater than left. Areas of nodular pleural thickening in the left lung base consistent with empyema versus malignant pleural effusion. Near complete left lower lobe and complete right lower lobe collapse. There are areas of low attenuation within the right lower lobe collapse consistent with pulmonary nodules versus pneumonia. Small groundglass infiltrates in the upper lobesright greater than left.     Aortic valve calcifications which may be associated with aortic valve stenosis, echocardiogram is recommended.     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 INTERVAL HPI/OVERNIGHT EVENTS:    Reviewed  Remains clinically stable    MEDICATIONS  (STANDING):  ampicillin/sulbactam  IVPB 3 Gram(s) IV Intermittent every 12 hours  clopidogrel Tablet 75 milliGRAM(s) Oral daily  dextrose 5%. 1000 milliLiter(s) (100 mL/Hr) IV Continuous <Continuous>  dextrose 5%. 1000 milliLiter(s) (50 mL/Hr) IV Continuous <Continuous>  dextrose 50% Injectable 12.5 Gram(s) IV Push once  dextrose 50% Injectable 25 Gram(s) IV Push once  dextrose 50% Injectable 25 Gram(s) IV Push once  heparin  Injectable 5000 Unit(s) SubCutaneous every 12 hours  insulin glargine Injectable (LANTUS) 10 Unit(s) SubCutaneous at bedtime  insulin lispro (HumaLOG) corrective regimen sliding scale   SubCutaneous Before meals and at bedtime  levothyroxine Injectable 90 MICROGram(s) IV Push <User Schedule>  povidone iodine 10% Solution 1 Application(s) Topical once  simvastatin 20 milliGRAM(s) Oral at bedtime    MEDICATIONS  (PRN):  dextrose 40% Gel 15 Gram(s) Oral once PRN Blood Glucose LESS THAN 70 milliGRAM(s)/deciliter  glucagon  Injectable 1 milliGRAM(s) IntraMuscular once PRN Glucose LESS THAN 70 milligrams/deciliter      Allergies    No Known Allergies    EXAM  Vital Signs Last 24 Hrs  T(C): 37 (09 Jan 2020 05:39), Max: 37.1 (08 Jan 2020 13:02)  T(F): 98.6 (09 Jan 2020 05:39), Max: 98.7 (08 Jan 2020 13:02)  HR: 61 (09 Jan 2020 09:16) (61 - 84)  BP: 107/66 (09 Jan 2020 05:39) (86/47 - 108/73)  BP(mean): --  RR: 20 (09 Jan 2020 09:16) (18 - 20)  SpO2: 94% (09 Jan 2020 09:16) (93% - 97%)  On NC O2 at present  Awakens and nods to question appropriately  No rash  RR  Chest with bilateral rhonchi and decreased BSs at bases  Abd soft NT      LABS:                        9.5    21.71 )-----------( 178      ( 09 Jan 2020 06:41 )             31.6     01-09    151<H>  |  123<H>  |  53<H>  ----------------------------<  252<H>  4.0   |  18<L>  |  1.42<H>    Ca    8.2<L>      09 Jan 2020 06:41  Mg     2.1     01-09            MICROBIOLOGY:    Culture - Blood (01.05.20 @ 13:10)    Specimen Source: .Blood Blood    Culture Results:   No growth at 3 days.    Culture - Blood (01.05.20 @ 13:10)    Specimen Source: .Blood Blood    Culture Results:   No growth at 3 days.        RADIOLOGY & ADDITIONAL STUDIES:    CT Chest No Cont (01.08.20 @ 15:30) >  EXAM:  CT CHEST                          PROCEDURE DATE:  01/08/2020          INTERPRETATION:  CT OF THE CHEST without contrast dated 1/8/2020 3:30 PM    INDICATION: Aspiration pneumonia secondary to dysphagia. Bilateral rhonchi on exam.    TECHNIQUE: CT of the chest was performed without intravenous contrast.  Intravenous contrast was not used , as requested. Axial and coronal images were produced and reviewed.    PRIOR STUDIES: None.    FINDINGS:   The heart is normal in size. Aortic valve calcifications. No pericardial effusion is seen.      Evaluation of the vasculature is limited without intravenous contrast, but there is no evidence of thoracic aortic aneurysm.  Severe atherosclerotic calcifications throughout the thoracic aorta. 4 cmproximal descending thoracic aortic aneurysm.    Evaluation for adenopathy is limited without intravenous contrast. Within that limitation, no mediastinal or hilar lymphadenopathy is seen.    8.8 x 5.9 x 5.2 cm left lower lobe ovoid area of air/fluidwith surrounding consolidation. Large bilateral pleural effusions right greater than left. Areas of nodular pleural thickening in the left lung base consistent with empyema versus malignant pleural effusion. Near complete left lower lobe and completeright lower lobe collapse. There are areas of low attenuation within the right lower lobe collapse consistent with pulmonary nodules versus pneumonia. Small groundglass infiltrates in the upper lobes right greater than left. Punctate calcified granuloma in the right upper lobe. A few bilateral pulmonary micronodules. Biapical pleural thickening, left greater than right with left apical pleural calcifications.    Limited evaluation of the upper abdomen demonstrates no significant abnormality.    Evaluation of the osseous structures demonstrates severe degenerative changes throughout the spine. Mild scoliosis. Respiratory motion artifacts obscuring detail. There is calcification noted superior to the right humeral head which may represent calcific bursitis, tendinitis, and/or chondrocalcinosis.    IMPRESSION:  8.8 x 5.9 x 5.2 cm left lower lobe ovoid area of air/fluid with surrounding consolidation compatible with a pulmonary abscess versus necrotizing pneumonia. Aspiration pneumonia, pulmonary infarction or necrotic neoplasm are included in the differential diagnosis.    Large bilateral pleural effusions right greater than left. Areas of nodular pleural thickening in the left lung base consistent with empyema versus malignant pleural effusion. Near complete left lower lobe and complete right lower lobe collapse. There are areas of low attenuation within the right lower lobe collapse consistent with pulmonary nodules versus pneumonia. Small groundglass infiltrates in the upper lobesright greater than left.     Aortic valve calcifications which may be associated with aortic valve stenosis, echocardiogram is recommended.     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

## 2020-01-09 NOTE — ADVANCED PRACTICE NURSE CONSULT - RECOMMEDATIONS
Triad cream to be applied over pressure injury, turn with Z-edin pillow. Spoke with RAYA Nuñez and house staff.

## 2020-01-09 NOTE — PROGRESS NOTE ADULT - SUBJECTIVE AND OBJECTIVE BOX
INTERVAL HPI/OVERNIGHT EVENTS:    OVERNIGHT: No overnight events.  SUBJECTIVE: Patient seen and examined at bedside. Found to  Pt with Stage 2 pressure injury to sacrum measuring approx 7x5 cm, pink wound bed with darker center.     ROS:  CV: Denies chest pain  Resp: Denies SOB  GI: Denies abdominal pain, constipation, diarrhea, nausea, vomiting  : Denies dysuria  ID: Denies fevers, chills  MSK: Denies joint pain     OBJECTIVE:    VITAL SIGNS:  ICU Vital Signs Last 24 Hrs  T(C): 37 (09 Jan 2020 05:39), Max: 37 (09 Jan 2020 05:39)  T(F): 98.6 (09 Jan 2020 05:39), Max: 98.6 (09 Jan 2020 05:39)  HR: 61 (09 Jan 2020 09:16) (61 - 84)  BP: 107/66 (09 Jan 2020 05:39) (107/66 - 108/73)  BP(mean): --  ABP: --  ABP(mean): --  RR: 20 (09 Jan 2020 09:16) (18 - 20)  SpO2: 94% (09 Jan 2020 09:16) (94% - 97%)        01-08 @ 07:01  -  01-09 @ 07:00  --------------------------------------------------------  IN: 0 mL / OUT: 100 mL / NET: -100 mL      CAPILLARY BLOOD GLUCOSE      POCT Blood Glucose.: 243 mg/dL (09 Jan 2020 13:25)      PHYSICAL EXAM:  General: NAD, comfortable  HEENT: NCAT, PERRL, clear conjunctiva, no scleral icterus  Neck: supple, no JVD  Respiratory: CTA b/l, no wheezing, rhonchi, rales  Cardiovascular: RRR, normal S1S2, no M/R/G  Vascular: 2+ radial and DP pulses  Abdomen: soft, NT/ND, bowel sounds in all four quadrants, no palpable masses  Extremities: WWP, no clubbing, cyanosis, or edema  Skin: No rashes present  Neuro:     MEDICATIONS:  MEDICATIONS  (STANDING):  clopidogrel Tablet 75 milliGRAM(s) Oral daily  dextrose 5%. 1000 milliLiter(s) (100 mL/Hr) IV Continuous <Continuous>  dextrose 5%. 1000 milliLiter(s) (50 mL/Hr) IV Continuous <Continuous>  dextrose 50% Injectable 12.5 Gram(s) IV Push once  dextrose 50% Injectable 25 Gram(s) IV Push once  dextrose 50% Injectable 25 Gram(s) IV Push once  heparin  Injectable 5000 Unit(s) SubCutaneous every 12 hours  insulin glargine Injectable (LANTUS) 10 Unit(s) SubCutaneous at bedtime  insulin lispro (HumaLOG) corrective regimen sliding scale   SubCutaneous Before meals and at bedtime  levothyroxine Injectable 90 MICROGram(s) IV Push <User Schedule>  piperacillin/tazobactam IVPB.. 2.25 Gram(s) IV Intermittent every 6 hours  povidone iodine 10% Solution 1 Application(s) Topical once  simvastatin 20 milliGRAM(s) Oral at bedtime  vancomycin  IVPB 500 milliGRAM(s) IV Intermittent once    MEDICATIONS  (PRN):  dextrose 40% Gel 15 Gram(s) Oral once PRN Blood Glucose LESS THAN 70 milliGRAM(s)/deciliter  glucagon  Injectable 1 milliGRAM(s) IntraMuscular once PRN Glucose LESS THAN 70 milligrams/deciliter      ALLERGIES:  Allergies    No Known Allergies    Intolerances        LABS:                        9.5    21.71 )-----------( 178      ( 09 Jan 2020 06:41 )             31.6     01-09    151<H>  |  123<H>  |  53<H>  ----------------------------<  252<H>  4.0   |  18<L>  |  1.42<H>    Ca    8.2<L>      09 Jan 2020 06:41  Mg     2.1     01-09            RADIOLOGY & ADDITIONAL TESTS: Reviewed. INTERVAL HPI/OVERNIGHT EVENTS:    OVERNIGHT: No overnight events.  SUBJECTIVE: Patient seen and examined at bedside. Found to have Stage 2 pressure injury to sacrum measuring approx 7x5 cm, pink wound bed with darker center.     ROS:  CV: Denies chest pain  Resp: Denies SOB  GI: Denies abdominal pain, constipation, diarrhea, nausea, vomiting  : Denies dysuria  ID: Denies fevers, chills  MSK: Denies joint pain     OBJECTIVE:    VITAL SIGNS:  ICU Vital Signs Last 24 Hrs  T(C): 37 (09 Jan 2020 05:39), Max: 37 (09 Jan 2020 05:39)  T(F): 98.6 (09 Jan 2020 05:39), Max: 98.6 (09 Jan 2020 05:39)  HR: 61 (09 Jan 2020 09:16) (61 - 84)  BP: 107/66 (09 Jan 2020 05:39) (107/66 - 108/73)  BP(mean): --  ABP: --  ABP(mean): --  RR: 20 (09 Jan 2020 09:16) (18 - 20)  SpO2: 94% (09 Jan 2020 09:16) (94% - 97%)        01-08 @ 07:01  -  01-09 @ 07:00  --------------------------------------------------------  IN: 0 mL / OUT: 100 mL / NET: -100 mL      CAPILLARY BLOOD GLUCOSE      POCT Blood Glucose.: 243 mg/dL (09 Jan 2020 13:25)      PHYSICAL EXAM:  General: NAD, comfortable, cachectic   HEENT: NCAT, PERRL, clear conjunctiva, no scleral icterus  Neck: supple, no JVD  Respiratory: CTA b/l, no wheezing, rhonchi, rales  Cardiovascular: RRR, normal S1S2, no M/R/G  Vascular: 2+ radial and DP pulses  Abdomen: soft, NT/ND, bowel sounds in all four quadrants, no palpable masses  Extremities: WWP, no clubbing, cyanosis, or edema  Skin: Stage 2 pressure injury to sacrum measuring approx 7x5 cm, pink wound bed with darker center.   Neuro: NFD    MEDICATIONS:  MEDICATIONS  (STANDING):  clopidogrel Tablet 75 milliGRAM(s) Oral daily  dextrose 5%. 1000 milliLiter(s) (100 mL/Hr) IV Continuous <Continuous>  dextrose 5%. 1000 milliLiter(s) (50 mL/Hr) IV Continuous <Continuous>  dextrose 50% Injectable 12.5 Gram(s) IV Push once  dextrose 50% Injectable 25 Gram(s) IV Push once  dextrose 50% Injectable 25 Gram(s) IV Push once  heparin  Injectable 5000 Unit(s) SubCutaneous every 12 hours  insulin glargine Injectable (LANTUS) 10 Unit(s) SubCutaneous at bedtime  insulin lispro (HumaLOG) corrective regimen sliding scale   SubCutaneous Before meals and at bedtime  levothyroxine Injectable 90 MICROGram(s) IV Push <User Schedule>  piperacillin/tazobactam IVPB.. 2.25 Gram(s) IV Intermittent every 6 hours  povidone iodine 10% Solution 1 Application(s) Topical once  simvastatin 20 milliGRAM(s) Oral at bedtime  vancomycin  IVPB 500 milliGRAM(s) IV Intermittent once    MEDICATIONS  (PRN):  dextrose 40% Gel 15 Gram(s) Oral once PRN Blood Glucose LESS THAN 70 milliGRAM(s)/deciliter  glucagon  Injectable 1 milliGRAM(s) IntraMuscular once PRN Glucose LESS THAN 70 milligrams/deciliter      ALLERGIES:  Allergies    No Known Allergies    Intolerances        LABS:                        9.5    21.71 )-----------( 178      ( 09 Jan 2020 06:41 )             31.6     01-09    151<H>  |  123<H>  |  53<H>  ----------------------------<  252<H>  4.0   |  18<L>  |  1.42<H>    Ca    8.2<L>      09 Jan 2020 06:41  Mg     2.1     01-09            RADIOLOGY & ADDITIONAL TESTS: Reviewed.

## 2020-01-09 NOTE — PROGRESS NOTE ADULT - SUBJECTIVE AND OBJECTIVE BOX
Pt seen and examined coverage for Dr Monsalve  no change  no sob  seems comfortable    REVIEW OF SYSTEMS:  unable      MEDICATIONS:  MEDICATIONS  (STANDING):  ampicillin/sulbactam  IVPB 3 Gram(s) IV Intermittent every 12 hours  clopidogrel Tablet 75 milliGRAM(s) Oral daily  dextrose 5%. 1000 milliLiter(s) (100 mL/Hr) IV Continuous <Continuous>  dextrose 5%. 1000 milliLiter(s) (50 mL/Hr) IV Continuous <Continuous>  dextrose 50% Injectable 12.5 Gram(s) IV Push once  dextrose 50% Injectable 25 Gram(s) IV Push once  dextrose 50% Injectable 25 Gram(s) IV Push once  heparin  Injectable 5000 Unit(s) SubCutaneous every 12 hours  insulin glargine Injectable (LANTUS) 10 Unit(s) SubCutaneous at bedtime  insulin lispro (HumaLOG) corrective regimen sliding scale   SubCutaneous Before meals and at bedtime  levothyroxine Injectable 90 MICROGram(s) IV Push <User Schedule>  povidone iodine 10% Solution 1 Application(s) Topical once  simvastatin 20 milliGRAM(s) Oral at bedtime    MEDICATIONS  (PRN):  dextrose 40% Gel 15 Gram(s) Oral once PRN Blood Glucose LESS THAN 70 milliGRAM(s)/deciliter  glucagon  Injectable 1 milliGRAM(s) IntraMuscular once PRN Glucose LESS THAN 70 milligrams/deciliter      Allergies    No Known Allergies    Intolerances        Vital Signs Last 24 Hrs  T(C): 37 (09 Jan 2020 05:39), Max: 37 (09 Jan 2020 05:39)  T(F): 98.6 (09 Jan 2020 05:39), Max: 98.6 (09 Jan 2020 05:39)  HR: 61 (09 Jan 2020 09:16) (61 - 84)  BP: 107/66 (09 Jan 2020 05:39) (107/66 - 108/73)  BP(mean): --  RR: 20 (09 Jan 2020 09:16) (18 - 20)  SpO2: 94% (09 Jan 2020 09:16) (94% - 97%)    01-08 @ 07:01  -  01-09 @ 07:00  --------------------------------------------------------  IN: 0 mL / OUT: 100 mL / NET: -100 mL        PHYSICAL EXAM:    General: thin; in no acute distress  HEENT: MMM, conjunctiva and sclera clear  Lungs: poor insp  Heart: regular  Gastrointestinal: Soft non-tender non-distended; Normal bowel sounds;   Skin: Warm and dry. No obvious rash    LABS:      CBC Full  -  ( 09 Jan 2020 06:41 )  WBC Count : 21.71 K/uL  RBC Count : 3.67 M/uL  Hemoglobin : 9.5 g/dL  Hematocrit : 31.6 %  Platelet Count - Automated : 178 K/uL  Mean Cell Volume : 86.1 fl  Mean Cell Hemoglobin : 25.9 pg  Mean Cell Hemoglobin Concentration : 30.1 gm/dL  Auto Neutrophil # : x  Auto Lymphocyte # : x  Auto Monocyte # : x  Auto Eosinophil # : x  Auto Basophil # : x  Auto Neutrophil % : x  Auto Lymphocyte % : x  Auto Monocyte % : x  Auto Eosinophil % : x  Auto Basophil % : x    01-09    151<H>  |  123<H>  |  53<H>  ----------------------------<  252<H>  4.0   |  18<L>  |  1.42<H>    Ca    8.2<L>      09 Jan 2020 06:41  Mg     2.1     01-09                        RADIOLOGY & ADDITIONAL STUDIES (The following images were personally reviewed):

## 2020-01-09 NOTE — PROGRESS NOTE ADULT - ASSESSMENT
RECOMMEND  OK to escalate/change the antibiotics, if within the goals of care, to:  Pip-Tazo 2,25 gm IV q 6 hours and IV Vancomycin 500 mg once and then check the level the following morning  Pulmonary plan Necrotizing pneumonia, possibly evolving abscess and atelectasis/lung collapse  Suspected aspirations    RECOMMEND  OK to escalate/change the antibiotics, if within the goals of care, to:  Pip-Tazo 2,25 gm IV q 6 hours and IV Vancomycin 500 mg once and then check the level the following morning  Aggressive chest PT

## 2020-01-09 NOTE — GOALS OF CARE CONVERSATION - ADVANCED CARE PLANNING - CONVERSATION DETAILS
Had discussion with the patient's daughter (Jorge Alberto) regarding the patient's current medical condition and goals of care.  Jorge Alberto states that she has had conversations about code status with her father in the past; states that after the natural death of a close friend, her Father disagreed and stated that he would want full resuscitative attempts if he was ever in that position.  Therefore, Jorge Alberto wants to honor her Father's requests to remain full code, even if the attempt would most likely be futile and cause harm. She understands that he is likely not a candidate for any aggressive or surgical interventions, however, would still like interventions that are reasonable like IV antibiotics and IVF.  Her ultimate goal is to get her Father well enough to go home, so that he can be in the comforts of his own home and be around family.  His dysphagia was also discussed, as he has failed S&S evaluations including FEES and came to the hospital as a result of aspiration pneumonia.  When he is able to, she would like to feed her Father (with safest option possible) and accepts the risk of aspiration.  She understands that PEG is not an option as the risks of harm outweigh the benefits and it is not going to prolong life.  Home hospice was discussed as an option if patient were to become DNR/DNI but Jorge Alberto again wanted to honor her Father's decision for full code.  She was not interested in speaking to the palliative medicine team.

## 2020-01-09 NOTE — PROGRESS NOTE ADULT - PROBLEM SELECTOR PLAN 7
hx DM2. holding home doses of januvia 50mg and nateglinide 60mg   - ISS  -c/w lantus 10 U  -on D5+1/2 NS 75cc/hr for nutrition while NPO  -monitor sugars hx DM2. holding home doses of januvia 50mg and nateglinide 60mg   - ISS  -c/w lantus 10 U  -on D5 100cc/hr for nutrition while NPO  -monitor sugars

## 2020-01-09 NOTE — ADVANCED PRACTICE NURSE CONSULT - ASSESSMENT
89M PMH DM, HTN, HLD, surgical hx of uncertain vascular procedure in left leg (presumed arthrectomy or angioplasty) presents with generalized weakness admitted with severe sepsis secondary to aspiration pneumonia. Pt with Stage 2 pressure injury to sacrum measuring approx 7x5 cm, pink wound bed with darker center. Bilat feet with intact dressings, Z-edin boots bilaterally. Pt currently being turned with wedges, Z-edin pillow to be obtained along with waffle cushion for when pt is up in chair at home. Spoke with family at bedside re: turning and repositioning every 2 hours, not leaving pt up in chair too long.

## 2020-01-09 NOTE — PROGRESS NOTE ADULT - PROBLEM SELECTOR PLAN 2
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

## 2020-01-09 NOTE — PROGRESS NOTE ADULT - SUBJECTIVE AND OBJECTIVE BOX
Neurology Follow up note    Name  VINCENT LYNCH    HPI:  89M PMH DM, HTN, HLD, surgical hx of uncertain vascular procedure in left leg (presumed arthrectomy or angioplasty) presents with generalized weakness. The patient is not able to speak when questioned during the encounter and the daughter at bedside is able to provide the history. As per the daughter, the patient was noted to be diagnosed with pneumonia 2 months ago and was treated with antibiotics as an outpatient. Since then, the patient has been having a progressive productive cough with yellow sputum. He has been having difficulty eating in which she notes that he coughs up his food upon swallowing. This past month he has been feeling progressively weaker and today had trouble moving from the car to the wheelchair which prompted his ER visit. Of note, the daughter notes that 3 months ago, the patient had a stent recently placed in the left leg for peripheral vascular disease. The patient is not able to give a history given his mental status and review of systems cannot be assessed.    ED Course: Vitals: T 100.6F HR 83 BP 97/53 RR 18 O2 96% 1L NC  Chest xray: Right effusion. Small left effusion cannot be excluded.   Labs: Wbc 36.83 Hb; 11.8 Plt 262 Cl 110 CO2 19 BUN/Cr 61/1.96 Glucose 413 Lactate 3.3 elevated alk phos and LFTs, trops 0.23 UA neg, RSV neg  Given Vanc 1g, zosyn 3.375g, 1L NS Bolus, acetaminophen 650mg suppository, insulin regular 4mg IVP, blood and urine cultures drawn (05 Jan 2020 17:25)      Interval History - no change in neuro status - no new focal weakness - no new tremors        REVIEW OF SYSTEMS    Vital Signs Last 24 Hrs  T(C): 37 (09 Jan 2020 05:39), Max: 37.1 (08 Jan 2020 13:02)  T(F): 98.6 (09 Jan 2020 05:39), Max: 98.7 (08 Jan 2020 13:02)  HR: 68 (09 Jan 2020 06:03) (64 - 84)  BP: 107/66 (09 Jan 2020 05:39) (86/47 - 108/73)  BP(mean): --  RR: 20 (09 Jan 2020 06:03) (18 - 20)  SpO2: 96% (09 Jan 2020 06:03) (93% - 97%)    Physical Exam-     Mental Status- awake and alert- poor memory    Cranial Nerves- full EOM    Gait and station- n/a    Motor- generalized weakness    Reflexes- decreased    Sensation- no sensory level    Coordination- no tremors    Vascular - no bruits    Medications  ampicillin/sulbactam  IVPB 3 Gram(s) IV Intermittent every 12 hours  clopidogrel Tablet 75 milliGRAM(s) Oral daily  dextrose 40% Gel 15 Gram(s) Oral once PRN  dextrose 5%. 1000 milliLiter(s) IV Continuous <Continuous>  dextrose 5%. 1000 milliLiter(s) IV Continuous <Continuous>  dextrose 50% Injectable 12.5 Gram(s) IV Push once  dextrose 50% Injectable 25 Gram(s) IV Push once  dextrose 50% Injectable 25 Gram(s) IV Push once  glucagon  Injectable 1 milliGRAM(s) IntraMuscular once PRN  heparin  Injectable 5000 Unit(s) SubCutaneous every 12 hours  insulin glargine Injectable (LANTUS) 10 Unit(s) SubCutaneous at bedtime  insulin lispro (HumaLOG) corrective regimen sliding scale   SubCutaneous Before meals and at bedtime  levothyroxine Injectable 90 MICROGram(s) IV Push <User Schedule>  povidone iodine 10% Solution 1 Application(s) Topical once  simvastatin 20 milliGRAM(s) Oral at bedtime      Lab      Radiology    Assessment- No acute neuro deficit    Plan supportive care - MRI head - no contrast

## 2020-01-10 LAB
ALBUMIN SERPL ELPH-MCNC: 1.3 G/DL — LOW (ref 3.3–5)
ALP SERPL-CCNC: 93 U/L — SIGNIFICANT CHANGE UP (ref 40–120)
ALT FLD-CCNC: 34 U/L — SIGNIFICANT CHANGE UP (ref 10–45)
ANION GAP SERPL CALC-SCNC: 10 MMOL/L — SIGNIFICANT CHANGE UP (ref 5–17)
AST SERPL-CCNC: 37 U/L — SIGNIFICANT CHANGE UP (ref 10–40)
BILIRUB SERPL-MCNC: 1.2 MG/DL — SIGNIFICANT CHANGE UP (ref 0.2–1.2)
BUN SERPL-MCNC: 51 MG/DL — HIGH (ref 7–23)
CALCIUM SERPL-MCNC: 7.7 MG/DL — LOW (ref 8.4–10.5)
CHLORIDE SERPL-SCNC: 117 MMOL/L — HIGH (ref 96–108)
CO2 SERPL-SCNC: 17 MMOL/L — LOW (ref 22–31)
CREAT SERPL-MCNC: 1.32 MG/DL — HIGH (ref 0.5–1.3)
CULTURE RESULTS: SIGNIFICANT CHANGE UP
CULTURE RESULTS: SIGNIFICANT CHANGE UP
GLUCOSE BLDC GLUCOMTR-MCNC: 198 MG/DL — HIGH (ref 70–99)
GLUCOSE BLDC GLUCOMTR-MCNC: 228 MG/DL — HIGH (ref 70–99)
GLUCOSE BLDC GLUCOMTR-MCNC: 228 MG/DL — HIGH (ref 70–99)
GLUCOSE BLDC GLUCOMTR-MCNC: 232 MG/DL — HIGH (ref 70–99)
GLUCOSE SERPL-MCNC: 228 MG/DL — HIGH (ref 70–99)
HCT VFR BLD CALC: 25.1 % — LOW (ref 39–50)
HGB BLD-MCNC: 7.9 G/DL — LOW (ref 13–17)
MAGNESIUM SERPL-MCNC: 1.9 MG/DL — SIGNIFICANT CHANGE UP (ref 1.6–2.6)
MCHC RBC-ENTMCNC: 26.1 PG — LOW (ref 27–34)
MCHC RBC-ENTMCNC: 31.5 GM/DL — LOW (ref 32–36)
MCV RBC AUTO: 82.8 FL — SIGNIFICANT CHANGE UP (ref 80–100)
NRBC # BLD: 0 /100 WBCS — SIGNIFICANT CHANGE UP (ref 0–0)
PHOSPHATE SERPL-MCNC: 2.9 MG/DL — SIGNIFICANT CHANGE UP (ref 2.5–4.5)
PLATELET # BLD AUTO: 162 K/UL — SIGNIFICANT CHANGE UP (ref 150–400)
POTASSIUM SERPL-MCNC: 3.5 MMOL/L — SIGNIFICANT CHANGE UP (ref 3.5–5.3)
POTASSIUM SERPL-SCNC: 3.5 MMOL/L — SIGNIFICANT CHANGE UP (ref 3.5–5.3)
PROT SERPL-MCNC: 4.6 G/DL — LOW (ref 6–8.3)
RBC # BLD: 3.03 M/UL — LOW (ref 4.2–5.8)
RBC # FLD: 17.6 % — HIGH (ref 10.3–14.5)
SODIUM SERPL-SCNC: 144 MMOL/L — SIGNIFICANT CHANGE UP (ref 135–145)
SPECIMEN SOURCE: SIGNIFICANT CHANGE UP
SPECIMEN SOURCE: SIGNIFICANT CHANGE UP
VANCOMYCIN TROUGH SERPL-MCNC: 5.4 UG/ML — LOW (ref 10–20)
WBC # BLD: 14.81 K/UL — HIGH (ref 3.8–10.5)
WBC # FLD AUTO: 14.81 K/UL — HIGH (ref 3.8–10.5)

## 2020-01-10 PROCEDURE — 99232 SBSQ HOSP IP/OBS MODERATE 35: CPT

## 2020-01-10 RX ORDER — POTASSIUM CHLORIDE 20 MEQ
10 PACKET (EA) ORAL
Refills: 0 | Status: COMPLETED | OUTPATIENT
Start: 2020-01-10 | End: 2020-01-10

## 2020-01-10 RX ORDER — VANCOMYCIN HCL 1 G
1000 VIAL (EA) INTRAVENOUS ONCE
Refills: 0 | Status: COMPLETED | OUTPATIENT
Start: 2020-01-10 | End: 2020-01-10

## 2020-01-10 RX ADMIN — PIPERACILLIN AND TAZOBACTAM 200 GRAM(S): 4; .5 INJECTION, POWDER, LYOPHILIZED, FOR SOLUTION INTRAVENOUS at 12:17

## 2020-01-10 RX ADMIN — Medication 2: at 17:03

## 2020-01-10 RX ADMIN — Medication 250 MILLIGRAM(S): at 17:03

## 2020-01-10 RX ADMIN — Medication 90 MICROGRAM(S): at 05:33

## 2020-01-10 RX ADMIN — Medication 2: at 09:33

## 2020-01-10 RX ADMIN — PIPERACILLIN AND TAZOBACTAM 200 GRAM(S): 4; .5 INJECTION, POWDER, LYOPHILIZED, FOR SOLUTION INTRAVENOUS at 00:32

## 2020-01-10 RX ADMIN — SODIUM CHLORIDE 100 MILLILITER(S): 9 INJECTION, SOLUTION INTRAVENOUS at 23:28

## 2020-01-10 RX ADMIN — Medication 1 APPLICATION(S): at 19:38

## 2020-01-10 RX ADMIN — Medication 100 MILLIEQUIVALENT(S): at 12:17

## 2020-01-10 RX ADMIN — Medication 100 MILLIEQUIVALENT(S): at 13:03

## 2020-01-10 RX ADMIN — CLOPIDOGREL BISULFATE 75 MILLIGRAM(S): 75 TABLET, FILM COATED ORAL at 13:02

## 2020-01-10 RX ADMIN — SODIUM CHLORIDE 100 MILLILITER(S): 9 INJECTION, SOLUTION INTRAVENOUS at 12:21

## 2020-01-10 RX ADMIN — Medication 1: at 13:21

## 2020-01-10 RX ADMIN — HEPARIN SODIUM 5000 UNIT(S): 5000 INJECTION INTRAVENOUS; SUBCUTANEOUS at 05:33

## 2020-01-10 RX ADMIN — PIPERACILLIN AND TAZOBACTAM 200 GRAM(S): 4; .5 INJECTION, POWDER, LYOPHILIZED, FOR SOLUTION INTRAVENOUS at 05:34

## 2020-01-10 RX ADMIN — PIPERACILLIN AND TAZOBACTAM 200 GRAM(S): 4; .5 INJECTION, POWDER, LYOPHILIZED, FOR SOLUTION INTRAVENOUS at 17:03

## 2020-01-10 RX ADMIN — SIMVASTATIN 20 MILLIGRAM(S): 20 TABLET, FILM COATED ORAL at 22:41

## 2020-01-10 RX ADMIN — INSULIN GLARGINE 10 UNIT(S): 100 INJECTION, SOLUTION SUBCUTANEOUS at 22:40

## 2020-01-10 RX ADMIN — Medication 100 MILLIEQUIVALENT(S): at 14:13

## 2020-01-10 RX ADMIN — HEPARIN SODIUM 5000 UNIT(S): 5000 INJECTION INTRAVENOUS; SUBCUTANEOUS at 17:15

## 2020-01-10 RX ADMIN — Medication 2: at 22:40

## 2020-01-10 NOTE — CHART NOTE - NSCHARTNOTEFT_GEN_A_CORE
Admitting Diagnosis:   Patient is a 89y old  Male who presents with a chief complaint of sepsis secondary to aspiration pneumonia (09 Jan 2020 15:31)      PAST MEDICAL & SURGICAL HISTORY:  PVD (peripheral vascular disease)  CAD (coronary artery disease)  Hyperlipidemia  Hypertension  Diabetes  PVD (peripheral vascular disease): s/p stent placement in left leg 3 months ago      Current Nutrition Order: NPO except meds    GI Issues: pt with no recent s/s n/v/d/c     Pain: pt with no recent s/s pain     Skin Integrity: kaushik 11  left 1st toe - unstageable  sacral - stage II  left 3rd toe - unstageable  left foot - DTI  left ankle - unstageable  left heel - unstageable  right 1st toe - unstageable  right buttock - unstageable  right heel - DTI  right ankle - stage I     Labs:   01-10    144  |  117<H>  |  51<H>  ----------------------------<  228<H>  3.5   |  17<L>  |  1.32<H>    Ca    7.7<L>      10 Cedric 2020 06:36  Phos  2.9     01-10  Mg     1.9     01-10    TPro  4.6<L>  /  Alb  1.3<L>  /  TBili  1.2  /  DBili  x   /  AST  37  /  ALT  34  /  AlkPhos  93  01-10    CAPILLARY BLOOD GLUCOSE      POCT Blood Glucose.: 198 mg/dL (10 Cedric 2020 13:13)  POCT Blood Glucose.: 232 mg/dL (10 Cedric 2020 09:10)  POCT Blood Glucose.: 228 mg/dL (09 Jan 2020 22:08)  POCT Blood Glucose.: 310 mg/dL (09 Jan 2020 17:28)      Medications:  MEDICATIONS  (STANDING):  clopidogrel Tablet 75 milliGRAM(s) Oral daily  dextrose 5%. 1000 milliLiter(s) (100 mL/Hr) IV Continuous <Continuous>  dextrose 5%. 1000 milliLiter(s) (50 mL/Hr) IV Continuous <Continuous>  dextrose 50% Injectable 12.5 Gram(s) IV Push once  dextrose 50% Injectable 25 Gram(s) IV Push once  dextrose 50% Injectable 25 Gram(s) IV Push once  heparin  Injectable 5000 Unit(s) SubCutaneous every 12 hours  insulin glargine Injectable (LANTUS) 10 Unit(s) SubCutaneous at bedtime  insulin lispro (HumaLOG) corrective regimen sliding scale   SubCutaneous Before meals and at bedtime  levothyroxine Injectable 90 MICROGram(s) IV Push <User Schedule>  piperacillin/tazobactam IVPB.. 2.25 Gram(s) IV Intermittent every 6 hours  potassium chloride  10 mEq/100 mL IVPB 10 milliEquivalent(s) IV Intermittent every 1 hour  povidone iodine 10% Solution 1 Application(s) Topical once  simvastatin 20 milliGRAM(s) Oral at bedtime    MEDICATIONS  (PRN):  dextrose 40% Gel 15 Gram(s) Oral once PRN Blood Glucose LESS THAN 70 milliGRAM(s)/deciliter  glucagon  Injectable 1 milliGRAM(s) IntraMuscular once PRN Glucose LESS THAN 70 milligrams/deciliter      Weight: (1/6) 55kg    Weight Change: no new weight documented     Estimated energy needs:   Wt (1/6) 55kg; Ht 165.1cm; BMI 20.2; IBW 61.8kg; %IBW 89%   Nutrient needs calculated for advanced age; increased kcal/pro requirements d/t multiple pressure ulcers and infection  Aim for upper end of ranges   Energy: 1375-1650kcal (25-30cal/kg)  Protein: 88-99g pro (1.6-1.8g pro/kg)    Fluid: 1650-1925ml (30-35ml/kg)     Subjective:   89M PMH DM, HTN, HLD, surgical hx of uncertain vascular procedure in left leg (presumed arthrectomy or angioplasty) presents with generalized weakness admitted with severe sepsis (bp 85/43, leukocytosis, acute respiratory failure) secondary to aspiration pneumonia now on on vancomycin and zosyn in setting of new empyema on CT chest.  Pt is s/p SLP eval 1/6; high aspiration risk - recommending FEES.  S/p FEES 1/7; failed - recommending Palliative consult.  Family is refusing to speak with Palliative Care.  [facility] discussion 1/9: pt is full code.  Daughter wishes to feed patient as able and accepts the risk of aspiration.  Pt is not a candidate for PEG placement; daughter is amenable.  Pt seen in bed on NC.  HHA at bedside.  Pt remains NPO with D5 IVF running.        Previous Nutrition Diagnosis:    Active [   ]  Resolved [   ]    If resolved, new PES:     Goal:    Recommendations:    Education:     Risk Level: High [   ] Moderate [   ] Low [   ] Admitting Diagnosis:   Patient is a 89y old  Male who presents with a chief complaint of sepsis secondary to aspiration pneumonia (09 Jan 2020 15:31)      PAST MEDICAL & SURGICAL HISTORY:  PVD (peripheral vascular disease)  CAD (coronary artery disease)  Hyperlipidemia  Hypertension  Diabetes  PVD (peripheral vascular disease): s/p stent placement in left leg 3 months ago      Current Nutrition Order: NPO except meds    GI Issues: pt with no recent s/s n/v/d/c     Pain: pt with no recent s/s pain     Skin Integrity: kaushik 11  left 1st toe - unstageable  sacral - stage II  left 3rd toe - unstageable  left foot - DTI  left ankle - unstageable  left heel - unstageable  right 1st toe - unstageable  right buttock - unstageable  right heel - DTI  right ankle - stage I     Labs:   01-10    144  |  117<H>  |  51<H>  ----------------------------<  228<H>  3.5   |  17<L>  |  1.32<H>    Ca    7.7<L>      10 Cedric 2020 06:36  Phos  2.9     01-10  Mg     1.9     01-10    TPro  4.6<L>  /  Alb  1.3<L>  /  TBili  1.2  /  DBili  x   /  AST  37  /  ALT  34  /  AlkPhos  93  01-10    CAPILLARY BLOOD GLUCOSE      POCT Blood Glucose.: 198 mg/dL (10 Cedric 2020 13:13)  POCT Blood Glucose.: 232 mg/dL (10 Cedric 2020 09:10)  POCT Blood Glucose.: 228 mg/dL (09 Jan 2020 22:08)  POCT Blood Glucose.: 310 mg/dL (09 Jan 2020 17:28)      Medications:  MEDICATIONS  (STANDING):  clopidogrel Tablet 75 milliGRAM(s) Oral daily  dextrose 5%. 1000 milliLiter(s) (100 mL/Hr) IV Continuous <Continuous>  dextrose 5%. 1000 milliLiter(s) (50 mL/Hr) IV Continuous <Continuous>  dextrose 50% Injectable 12.5 Gram(s) IV Push once  dextrose 50% Injectable 25 Gram(s) IV Push once  dextrose 50% Injectable 25 Gram(s) IV Push once  heparin  Injectable 5000 Unit(s) SubCutaneous every 12 hours  insulin glargine Injectable (LANTUS) 10 Unit(s) SubCutaneous at bedtime  insulin lispro (HumaLOG) corrective regimen sliding scale   SubCutaneous Before meals and at bedtime  levothyroxine Injectable 90 MICROGram(s) IV Push <User Schedule>  piperacillin/tazobactam IVPB.. 2.25 Gram(s) IV Intermittent every 6 hours  potassium chloride  10 mEq/100 mL IVPB 10 milliEquivalent(s) IV Intermittent every 1 hour  povidone iodine 10% Solution 1 Application(s) Topical once  simvastatin 20 milliGRAM(s) Oral at bedtime    MEDICATIONS  (PRN):  dextrose 40% Gel 15 Gram(s) Oral once PRN Blood Glucose LESS THAN 70 milliGRAM(s)/deciliter  glucagon  Injectable 1 milliGRAM(s) IntraMuscular once PRN Glucose LESS THAN 70 milligrams/deciliter      Weight: (1/6) 55kg    Weight Change: no new weight documented     Estimated energy needs:   Wt (1/6) 55kg; Ht 165.1cm; BMI 20.2; IBW 61.8kg; %IBW 89%   Nutrient needs calculated for advanced age; increased kcal/pro requirements d/t multiple pressure ulcers and infection  Aim for upper end of ranges   Energy: 1375-1650kcal (25-30cal/kg)  Protein: 88-99g pro (1.6-1.8g pro/kg)    Fluid: 1650-1925ml (30-35ml/kg)     Subjective:   89M PMH DM, HTN, HLD, surgical hx of uncertain vascular procedure in left leg (presumed arthrectomy or angioplasty) presents with generalized weakness admitted with severe sepsis (bp 85/43, leukocytosis, acute respiratory failure) secondary to aspiration pneumonia now on on vancomycin and zosyn in setting of new empyema on CT chest.  Pt is s/p SLP eval 1/6; high aspiration risk - recommending FEES.  S/p FEES 1/7; failed - recommending Palliative consult.  Family is refusing to speak with Palliative Care.  [facility] discussion 1/9: pt is full code.  Daughter wishes to feed patient as able and accepts the risk of aspiration.  Pt is not a candidate for PEG placement; daughter is amenable.  Pt seen in bed on NC.  HHA at bedside.  Pt remains NPO per SLP recommendations  with D5 IVF running.  Antibiotics continued.      Previous Nutrition Diagnosis: Inadequate oral intake r/t inability to meet EER AEB NPO on Bipap     Active [   ]  Resolved [  x ]    If resolved, new PES: Inadequate energy intake r/t h/o dysphagia; high aspiration risk AEB NPO status and not PEG candidate    Goal: Optimize nutrition and hydration status as medically appropriate and within the goals of care     Recommendations:  1. Keep nutrition interventions in line with goals of care  2. Continue to monitor recommendations from SLP  3. Encourage Palliative Care consult     Education: not appropriate at this time.     Risk Level: High [  x ] Moderate [   ] Low [   ]

## 2020-01-10 NOTE — PROGRESS NOTE ADULT - PROBLEM SELECTOR PLAN 7
hx DM2. holding home doses of januvia 50mg and nateglinide 60mg   - ISS  -c/w lantus 10 U  -on D5 100cc/hr for nutrition while NPO  -monitor sugars

## 2020-01-10 NOTE — PROGRESS NOTE ADULT - PROBLEM SELECTOR PROBLEM 2
PVD (peripheral vascular disease) Follow up with your orthopedic in 1-2 days    Foot Fracture in Adults    WHAT YOU NEED TO KNOW:    A foot fracture is a break in one or more of the bones in your foot. Foot fractures are commonly caused by trauma, falls, or repeated stress injuries. Foot Anatomy         DISCHARGE INSTRUCTIONS:    Medicines:     Antibiotics: This medicine is given to help treat or prevent an infection caused by bacteria.       NSAIDs: These medicines decrease swelling and pain. NSAIDs are available without a doctor's order. Ask which medicine is right for you. Ask how much to take and when to take it. Take as directed. NSAIDs can cause stomach bleeding and kidney problems if not taken correctly.      Pain medicine: You may be given a prescription medicine to decrease pain. Do not wait until the pain is severe before you take this medicine.      Take your medicine as directed. Contact your healthcare provider if you think your medicine is not helping or if you have side effects. Tell him of her if you are allergic to any medicine. Keep a list of the medicines, vitamins, and herbs you take. Include the amounts, and when and why you take them. Bring the list or the pill bottles to follow-up visits. Carry your medicine list with you in case of an emergency.    Follow up with your healthcare provider or bone specialist as directed: You may need to return to have your splint or stitches removed. You may also need to return for tests to make sure your foot is healing. Write down your questions so you remember to ask them during your visits.    Wound care: Carefully wash the wound with soap and water. Dry the area and put on new, clean bandages as directed. Change your bandages when they get wet or dirty.    Self-care:     Rest: You may need to rest your foot and avoid activities that cause pain. For stress fractures, you will need to avoid the activity that caused the fracture until it heals. Ask when you can return to your normal activities such as work and sports.      Ice: Ice helps decrease swelling and pain. Ice may also help prevent tissue damage. Use an ice pack or put crushed ice in a plastic bag. Cover it with a towel, and place it on your foot for 15 to 20 minutes every hour as directed.      Elevate your foot: Raise your foot at or above the level of your heart as often as you can. This will help decrease swelling and pain. Prop your foot on pillows or blankets to keep it elevated comfortably.       Physical therapy: Once your foot has healed, a physical therapist can teach you exercises to help improve movement and strength, and to decrease pain.    Splint care:     Check the skin around your splint daily for any redness or open areas.      Do not use a sharp or pointed object to scratch your skin under the splint.      Do not remove your splint unless your healthcare provider or orthopedic surgeon says it is okay.    Bathing with a splint: Do not let your splint get wet. Before bathing, cover the splint with a plastic bag. Tape the bag to your skin above the splint to seal out the water. Keep your foot out of the water in case the bag leaks. Ask when it is okay to take a bath or shower.    Assistive devices: You may be given a hard-soled shoe to wear while your foot is healing. You also may need to use crutches to help you walk while your foot heals. It is important to use your crutches correctly. Ask for more information about how to use crutches.    Contact your healthcare provider or bone specialist if:     You have a fever.      You have new sores around your boot or splint.      You have new or worsening trouble moving your foot.      You notice a foul smell coming from under your splint.      Your boot or splint gets damaged.      You have questions or concerns about your condition or care.    Return to the emergency department if:     The pain in your injured foot gets worse even after you rest and take pain medicine.      The skin or toes of your foot become numb, swollen, cold, white, or blue.      You have more pain or swelling than you did before the splint was put on.      Your wound is draining fluid or pus.      Blood soaks through your bandage.      Your leg feels warm, tender, and painful. It may look swollen and red.      You suddenly feel lightheaded and short of breath.      You have chest pain when you take a deep breath or cough. You may cough up blood.         © Copyright [employer] 2019 All illustrations and images included in CareNotes are the copyrighted property of [employer] or [employer].

## 2020-01-10 NOTE — PROGRESS NOTE ADULT - PROBLEM SELECTOR PLAN 1
Pt continuing to improve  - stop unasyn 3g q12 hours  - increase vancomycin to 1000 mg due to vanc trough today at 5.4; will have another vanc trough tomorrow  - c/w zosyn 2.25 mg IV q 6 hrs  - ID following  - S+S, FEES exam found dysphagia  - GI Dr. Wright did not feel pt good canditate for PEG    - pulm recommended CT chest, which found CT chest found a large air/fluid ovoid area compatible with pulmonary abscess. Found large bilateral pleural effusions R >L. 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     #Aspiration  -S+S found severe dysphagia, would need PEG or palliative  -pt to be NPO during time, and on D5 100 cc/hr (as pt became hypernatremic and hyperchloremic on 1/2 NS) for maintenance fluids Pt continuing to improve  - increase vancomycin to 1000 mg due to vanc trough today at 5.4; will have another vanc trough tomorrow  - c/w zosyn 2.25 mg IV q 6 hrs  - ID following  - S+S, FEES exam found dysphagia  - GI Dr. Wright did not feel pt good canditate for PEG    - pulm recommended CT chest, which found CT chest found a large air/fluid ovoid area compatible with pulmonary abscess. Found large bilateral pleural effusions R >L. 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     #Aspiration  -S+S found severe dysphagia, would need PEG or palliative  -pt to be NPO during time, and on D5 100 cc/hr (as pt became hypernatremic and hyperchloremic on 1/2 NS) for maintenance fluids

## 2020-01-10 NOTE — PROGRESS NOTE ADULT - SUBJECTIVE AND OBJECTIVE BOX
Pt seen and examined   on cpap  opens eyes when called    REVIEW OF SYSTEMS:  unable      MEDICATIONS:  MEDICATIONS  (STANDING):  clopidogrel Tablet 75 milliGRAM(s) Oral daily  dextrose 5%. 1000 milliLiter(s) (100 mL/Hr) IV Continuous <Continuous>  dextrose 5%. 1000 milliLiter(s) (50 mL/Hr) IV Continuous <Continuous>  dextrose 50% Injectable 12.5 Gram(s) IV Push once  dextrose 50% Injectable 25 Gram(s) IV Push once  dextrose 50% Injectable 25 Gram(s) IV Push once  heparin  Injectable 5000 Unit(s) SubCutaneous every 12 hours  insulin glargine Injectable (LANTUS) 10 Unit(s) SubCutaneous at bedtime  insulin lispro (HumaLOG) corrective regimen sliding scale   SubCutaneous Before meals and at bedtime  levothyroxine Injectable 90 MICROGram(s) IV Push <User Schedule>  piperacillin/tazobactam IVPB.. 2.25 Gram(s) IV Intermittent every 6 hours  potassium chloride  10 mEq/100 mL IVPB 10 milliEquivalent(s) IV Intermittent every 1 hour  povidone iodine 10% Solution 1 Application(s) Topical once  simvastatin 20 milliGRAM(s) Oral at bedtime    MEDICATIONS  (PRN):  dextrose 40% Gel 15 Gram(s) Oral once PRN Blood Glucose LESS THAN 70 milliGRAM(s)/deciliter  glucagon  Injectable 1 milliGRAM(s) IntraMuscular once PRN Glucose LESS THAN 70 milligrams/deciliter      Allergies    No Known Allergies    Intolerances        Vital Signs Last 24 Hrs  T(C): 36.7 (10 Cedric 2020 05:36), Max: 37.2 (09 Jan 2020 16:25)  T(F): 98 (10 Cedric 2020 05:36), Max: 98.9 (09 Jan 2020 16:25)  HR: 62 (10 Cedric 2020 06:17) (62 - 78)  BP: 98/58 (10 Cedric 2020 05:36) (98/58 - 133/84)  BP(mean): --  RR: 30 (10 Cedric 2020 06:17) (17 - 30)  SpO2: 100% (10 Cedric 2020 06:17) (92% - 100%)    01-09 @ 07:01  -  01-10 @ 07:00  --------------------------------------------------------  IN: 0 mL / OUT: 400 mL / NET: -400 mL        PHYSICAL EXAM:    General:  in no acute distress  HEENT: MMM, conjunctiva and sclera clear  Lungs: decreased BS RLL  Heart: regular  Gastrointestinal: Soft non-tender non-distended; Normal bowel sounds; No hepatosplenomegaly  Skin: Warm and dry. No obvious rash    LABS:      CBC Full  -  ( 10 Cedric 2020 06:36 )  WBC Count : 14.81 K/uL  RBC Count : 3.03 M/uL  Hemoglobin : 7.9 g/dL  Hematocrit : 25.1 %  Platelet Count - Automated : 162 K/uL  Mean Cell Volume : 82.8 fl  Mean Cell Hemoglobin : 26.1 pg  Mean Cell Hemoglobin Concentration : 31.5 gm/dL  Auto Neutrophil # : x  Auto Lymphocyte # : x  Auto Monocyte # : x  Auto Eosinophil # : x  Auto Basophil # : x  Auto Neutrophil % : x  Auto Lymphocyte % : x  Auto Monocyte % : x  Auto Eosinophil % : x  Auto Basophil % : x    01-10    144  |  117<H>  |  51<H>  ----------------------------<  228<H>  3.5   |  17<L>  |  1.32<H>    Ca    7.7<L>      10 Cedric 2020 06:36  Phos  2.9     01-10  Mg     1.9     01-10    TPro  4.6<L>  /  Alb  1.3<L>  /  TBili  1.2  /  DBili  x   /  AST  37  /  ALT  34  /  AlkPhos  93  01-10                      RADIOLOGY & ADDITIONAL STUDIES (The following images were personally reviewed):

## 2020-01-10 NOTE — PROGRESS NOTE ADULT - SUBJECTIVE AND OBJECTIVE BOX
Hospital Course   89M PMH DM, HTN, HLD, surgical hx of uncertain vascular procedure in left leg (presumed arthrectomy or angioplasty) presents with generalized weakness admitted with severe sepsis (bp 85/43, leukocytosis, acute respiratory failure) secondary to aspiration pneumonia. He was [id number], hypotensive SBPs 90s, elevated wbc 36, lactate 3.3. Given vanc and zosyn in the ED. Blood/urine cultures sent. Given 2 L IV NS. Right pleural effusion on chest xray, troponins elevated and peaked. Pt evaluated in ED by vascular for gangrenous toes and ulcers by vascular. ICU consulted for hemodynamic instability, and they deemed his stable for the [facility]. Pt again experienced BPs to 60s/30s, tachypnea to RR 30, pt put on bipap. Repeat CXR looked the same vs more congested. Pt improved on BIPAP, tranisitoned to 4L NC. Switched to unasyn for aspiration pneumonia. S+S FEES found pt w/ severe dysphagia, and recommended NPO. GI consuled for PEG per family. started fluids with D5 for nutrition + hypernatremia/hyperchloremia. 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 Ordered CTH for Toxic metabolic encepholopathy per neuro, which found no acute pathological cahnges. CT chest showed empyema v abscess, large bilateral effusions with consolidation. As per ID, started vancomycin and zosyn on 01/09. Found a large stage 2 pressure ulcer on sacrum. On 01/10, vancomycin trough 5.4 and increased vancomycin dose to 1000 mg with additional vanc trough to be assessed the next day.     INTERVAL HPI/OVERNIGHT EVENTS:    OVERNIGHT: No overnight events.  SUBJECTIVE: Patient seen and examined at bedside. Not fully responding to questions. Unable to complete ROS. Was on bipap and saturating well.     OBJECTIVE:    VITAL SIGNS:  ICU Vital Signs Last 24 Hrs  T(C): 37.1 (10 Cedric 2020 13:11), Max: 37.1 (10 Cedric 2020 13:11)  T(F): 98.7 (10 Cedric 2020 13:11), Max: 98.7 (10 Cedric 2020 13:11)  HR: 77 (10 Cedric 2020 13:11) (62 - 78)  BP: 107/63 (10 Cedric 2020 13:11) (98/58 - 133/84)  BP(mean): --  ABP: --  ABP(mean): --  RR: 32 (10 Cedric 2020 13:11) (17 - 32)  SpO2: 100% (10 Cedric 2020 13:11) (92% - 100%)        01-09 @ 07:01  -  01-10 @ 07:00  --------------------------------------------------------  IN: 0 mL / OUT: 400 mL / NET: -400 mL      CAPILLARY BLOOD GLUCOSE      POCT Blood Glucose.: 228 mg/dL (10 Cedric 2020 17:01)      PHYSICAL EXAM:  General: NAD, comfortable, cachectic   HEENT: NCAT, PERRL, clear conjunctiva, no scleral icterus  Neck: supple, no JVD  Respiratory: CTA b/l, no wheezing, rhonchi, rales  Cardiovascular: RRR, normal S1S2, no M/R/G  Vascular: 2+ radial and DP pulses  Abdomen: soft, NT/ND, bowel sounds in all four quadrants, no palpable masses  Extremities: WWP, no clubbing, cyanosis, or edema  Skin: Stage 2 pressure injury to sacrum measuring approx 7x5 cm, pink wound bed with darker center.   Neuro: Patient on BiPAP and not answering questions to assess orientation; moves limbs spontaneously; opens and closes eyes     MEDICATIONS:  MEDICATIONS  (STANDING):  clopidogrel Tablet 75 milliGRAM(s) Oral daily  dextrose 5%. 1000 milliLiter(s) (100 mL/Hr) IV Continuous <Continuous>  dextrose 5%. 1000 milliLiter(s) (50 mL/Hr) IV Continuous <Continuous>  dextrose 50% Injectable 12.5 Gram(s) IV Push once  dextrose 50% Injectable 25 Gram(s) IV Push once  dextrose 50% Injectable 25 Gram(s) IV Push once  heparin  Injectable 5000 Unit(s) SubCutaneous every 12 hours  insulin glargine Injectable (LANTUS) 10 Unit(s) SubCutaneous at bedtime  insulin lispro (HumaLOG) corrective regimen sliding scale   SubCutaneous Before meals and at bedtime  levothyroxine Injectable 90 MICROGram(s) IV Push <User Schedule>  piperacillin/tazobactam IVPB.. 2.25 Gram(s) IV Intermittent every 6 hours  povidone iodine 10% Solution 1 Application(s) Topical once  simvastatin 20 milliGRAM(s) Oral at bedtime    MEDICATIONS  (PRN):  dextrose 40% Gel 15 Gram(s) Oral once PRN Blood Glucose LESS THAN 70 milliGRAM(s)/deciliter  glucagon  Injectable 1 milliGRAM(s) IntraMuscular once PRN Glucose LESS THAN 70 milligrams/deciliter      ALLERGIES:  Allergies    No Known Allergies    Intolerances        LABS:                        7.9    14.81 )-----------( 162      ( 10 Cedric 2020 06:36 )             25.1     01-10    144  |  117<H>  |  51<H>  ----------------------------<  228<H>  3.5   |  17<L>  |  1.32<H>    Ca    7.7<L>      10 Cedric 2020 06:36  Phos  2.9     01-10  Mg     1.9     01-10    TPro  4.6<L>  /  Alb  1.3<L>  /  TBili  1.2  /  DBili  x   /  AST  37  /  ALT  34  /  AlkPhos  93  01-10          RADIOLOGY & ADDITIONAL TESTS: Reviewed.

## 2020-01-10 NOTE — PROGRESS NOTE ADULT - ASSESSMENT
89M PMH DM, HTN, HLD, surgical hx of uncertain vascular procedure in left leg (presumed arthrectomy or angioplasty) presents with generalized weakness admitted with severe sepsis (bp 85/43, leukocytosis, acute respiratory failure) secondary to aspiration pneumonia now on on vancomycin and zosyn in setting of new empyema on CT chest.

## 2020-01-10 NOTE — PROGRESS NOTE ADULT - SUBJECTIVE AND OBJECTIVE BOX
CC/ HPI Patient is a 89 year old male with CAD, HTN, PVD, LLE vascular procedure admitted with severe sepsis secondary to aspiration pneumonia, dysphagia, seen this morning the patient denies acute respiratory complaint.    PAST MEDICAL & SURGICAL HISTORY:  PVD (peripheral vascular disease)  CAD (coronary artery disease)  Hyperlipidemia  Hypertension  Diabetes  PVD (peripheral vascular disease): s/p stent placement in left leg 3 months ago    SOCHX:   -  alcohol    FMHX: FA/MO  - contributory     ROS reviewed below with positive findings marked (+) :  GEN:  fever, chills ENT: tracheostomy,   epistaxis,  sinusitis COR: +CAD, CHF,  +HTN, dysrhythmia PUL: COPD, ILD, asthma, pneumonia GI: PEG, dysphagia, hemorrhage, other MANDO: kidney disease, electrolyte disorder HEM:  anemia, thrombus, coagulopathy, cancer ENDO:  thyroid disease,+ diabetes mellitus CNS:  dementia, stroke, seizure, PSY:  depression, anxiety, other          MEDICATIONS  (STANDING):  clopidogrel Tablet 75 milliGRAM(s) Oral daily  dextrose 5%. 1000 milliLiter(s) (100 mL/Hr) IV Continuous <Continuous>  dextrose 5%. 1000 milliLiter(s) (50 mL/Hr) IV Continuous <Continuous>  dextrose 50% Injectable 12.5 Gram(s) IV Push once  dextrose 50% Injectable 25 Gram(s) IV Push once  dextrose 50% Injectable 25 Gram(s) IV Push once  heparin  Injectable 5000 Unit(s) SubCutaneous every 12 hours  insulin glargine Injectable (LANTUS) 10 Unit(s) SubCutaneous at bedtime  insulin lispro (HumaLOG) corrective regimen sliding scale   SubCutaneous Before meals and at bedtime  levothyroxine Injectable 90 MICROGram(s) IV Push <User Schedule>  piperacillin/tazobactam IVPB.. 2.25 Gram(s) IV Intermittent every 6 hours  simvastatin 20 milliGRAM(s) Oral at bedtime    MEDICATIONS  (PRN):  dextrose 40% Gel 15 Gram(s) Oral once PRN Blood Glucose LESS THAN 70 milliGRAM(s)/deciliter  glucagon  Injectable 1 milliGRAM(s) IntraMuscular once PRN Glucose LESS THAN 70 milligrams/deciliter      Vital Signs Last 24 Hrs  T(C): 37.1 (10 Cedric 2020 13:11), Max: 37.1 (10 Cedric 2020 13:11)  T(F): 98.7 (10 Cedric 2020 13:11), Max: 98.7 (10 Cedric 2020 13:11)  HR: 64 (10 Cedric 2020 15:30) (61 - 78)  BP: 107/63 (10 Cedric 2020 13:11) (98/58 - 133/84)  RR: 32 (10 Cedric 2020 13:11) (17 - 32)  SpO2: 93% (10 Cedric 2020 17:42) (92% - 100%)    GENERAL:         comfortable,  - distress.  HEENT:            - trauma,  - icterus,  - injection,  - nasal discharge.  NECK:              - jugular venous distention, - thyromegaly.  LYMPH:           - lymphadenopathy, - masses.  RESP:             + crackles,   - rhonchi,   - wheezes.   COR:                S1S2   - gallops,  - rubs.  ABD:                bowel sounds,   soft, - tender, - distended.  EXT/MSC:         - cyanosis,  - clubbing, - edema.    NEURO:           + alert                             7.9    14.81 )-----------( 162      ( 10 Cedric 2020 06:36 )             25.1     01-10    144  |  117<H>  |  51<H>  ----------------------------<  228<H>  3.5   |  17<L>  |  1.32<H>        CT Chest No Cont (01.08.20)  8.8 x 5.9 x 5.2 cm left lower lobe ovoid area of air/fluid with surrounding consolidation compatible with a pulmonary abscess versus necrotizing pneumonia. Aspiration pneumonia, pulmonary infarction or necrotic neoplasm are included in the differential diagnosis.  Large bilateral pleural effusions right greater than left. Areas of nodular pleural thickening in the left lung base consistent with empyema versus malignant pleural effusion. Near complete left lower lobe and complete right lower lobe collapse. There are areas of low attenuation within the right lower lobe collapse consistent with pulmonary nodules versus pneumonia. Small ground-glass infiltrates in the upper lobes right greater than left.       ASSESSMENT/PLAN    1) Pneumonia  2) Pleural effusion  3) Atelectasis    Leukocytosis improved, satisfactory SpO2.  Bronchodilators:  Atrovent/ albuterol q 4 – 6 hours as needed  ID/Antibiotics:  piperacillin/tazobactam, vancomycin  Cardiac/HTN:  hemodynamically stable  GI: Rx/ prophylaxis c PPI/H2B  Heme: Rx/VT prophylaxis  Discussed with medical team  Discussed with Dr. Monsalve.  Discussed with Dr. Bean who will cover  Jan 11-12.

## 2020-01-10 NOTE — ADVANCED PRACTICE NURSE CONSULT - ASSESSMENT
Per pt's family, he sleeps in a hospital bed with no speciality mattress. Recommendation is a Group 1 support surface, such as a gel overlay, to help relieve pressure and prevent additional breakdown.

## 2020-01-10 NOTE — PROGRESS NOTE ADULT - SUBJECTIVE AND OBJECTIVE BOX
Neurology Follow up note    Name  VINCENT LYNCH    HPI:  89M PMH DM, HTN, HLD, surgical hx of uncertain vascular procedure in left leg (presumed arthrectomy or angioplasty) presents with generalized weakness. The patient is not able to speak when questioned during the encounter and the daughter at bedside is able to provide the history. As per the daughter, the patient was noted to be diagnosed with pneumonia 2 months ago and was treated with antibiotics as an outpatient. Since then, the patient has been having a progressive productive cough with yellow sputum. He has been having difficulty eating in which she notes that he coughs up his food upon swallowing. This past month he has been feeling progressively weaker and today had trouble moving from the car to the wheelchair which prompted his ER visit. Of note, the daughter notes that 3 months ago, the patient had a stent recently placed in the left leg for peripheral vascular disease. The patient is not able to give a history given his mental status and review of systems cannot be assessed.    ED Course: Vitals: T 100.6F HR 83 BP 97/53 RR 18 O2 96% 1L NC  Chest xray: Right effusion. Small left effusion cannot be excluded.   Labs: Wbc 36.83 Hb; 11.8 Plt 262 Cl 110 CO2 19 BUN/Cr 61/1.96 Glucose 413 Lactate 3.3 elevated alk phos and LFTs, trops 0.23 UA neg, RSV neg  Given Vanc 1g, zosyn 3.375g, 1L NS Bolus, acetaminophen 650mg suppository, insulin regular 4mg IVP, blood and urine cultures drawn (05 Jan 2020 17:25)      Interval History - generalized weakness - no new tremors - dysphagia        REVIEW OF SYSTEMS    Vital Signs Last 24 Hrs  T(C): 37.1 (10 Cedric 2020 13:11), Max: 37.2 (09 Jan 2020 16:25)  T(F): 98.7 (10 Cedric 2020 13:11), Max: 98.9 (09 Jan 2020 16:25)  HR: 77 (10 Cedric 2020 13:11) (62 - 78)  BP: 107/63 (10 Cedric 2020 13:11) (98/58 - 133/84)  BP(mean): --  RR: 32 (10 Cedric 2020 13:11) (17 - 32)  SpO2: 100% (10 Cedric 2020 13:11) (92% - 100%)    Physical Exam-     Mental Status- responds to simple commands     Cranial Nerves- full EOM    Gait and station- n/a    Motor- moves all 4 extremities    Reflexes- decreased    Sensation- no sensory level    Coordination- no tremors    Vascular - no bruits    Medications  clopidogrel Tablet 75 milliGRAM(s) Oral daily  dextrose 40% Gel 15 Gram(s) Oral once PRN  dextrose 5%. 1000 milliLiter(s) IV Continuous <Continuous>  dextrose 5%. 1000 milliLiter(s) IV Continuous <Continuous>  dextrose 50% Injectable 12.5 Gram(s) IV Push once  dextrose 50% Injectable 25 Gram(s) IV Push once  dextrose 50% Injectable 25 Gram(s) IV Push once  glucagon  Injectable 1 milliGRAM(s) IntraMuscular once PRN  heparin  Injectable 5000 Unit(s) SubCutaneous every 12 hours  insulin glargine Injectable (LANTUS) 10 Unit(s) SubCutaneous at bedtime  insulin lispro (HumaLOG) corrective regimen sliding scale   SubCutaneous Before meals and at bedtime  levothyroxine Injectable 90 MICROGram(s) IV Push <User Schedule>  piperacillin/tazobactam IVPB.. 2.25 Gram(s) IV Intermittent every 6 hours  povidone iodine 10% Solution 1 Application(s) Topical once  simvastatin 20 milliGRAM(s) Oral at bedtime      Lab      Radiology    Assessment- No acute intracranial pathology     Plan supportive care

## 2020-01-11 LAB
ALBUMIN SERPL ELPH-MCNC: 1.3 G/DL — LOW (ref 3.3–5)
ALP SERPL-CCNC: 101 U/L — SIGNIFICANT CHANGE UP (ref 40–120)
ALT FLD-CCNC: 38 U/L — SIGNIFICANT CHANGE UP (ref 10–45)
ANION GAP SERPL CALC-SCNC: 15 MMOL/L — SIGNIFICANT CHANGE UP (ref 5–17)
AST SERPL-CCNC: 50 U/L — HIGH (ref 10–40)
BILIRUB SERPL-MCNC: 1.3 MG/DL — HIGH (ref 0.2–1.2)
BUN SERPL-MCNC: 39 MG/DL — HIGH (ref 7–23)
CALCIUM SERPL-MCNC: 7.2 MG/DL — LOW (ref 8.4–10.5)
CHLORIDE SERPL-SCNC: 108 MMOL/L — SIGNIFICANT CHANGE UP (ref 96–108)
CO2 SERPL-SCNC: 19 MMOL/L — LOW (ref 22–31)
CREAT SERPL-MCNC: 1.26 MG/DL — SIGNIFICANT CHANGE UP (ref 0.5–1.3)
GLUCOSE BLDC GLUCOMTR-MCNC: 138 MG/DL — HIGH (ref 70–99)
GLUCOSE BLDC GLUCOMTR-MCNC: 161 MG/DL — HIGH (ref 70–99)
GLUCOSE BLDC GLUCOMTR-MCNC: 215 MG/DL — HIGH (ref 70–99)
GLUCOSE BLDC GLUCOMTR-MCNC: 220 MG/DL — HIGH (ref 70–99)
GLUCOSE SERPL-MCNC: 221 MG/DL — HIGH (ref 70–99)
HCT VFR BLD CALC: 23.5 % — LOW (ref 39–50)
HGB BLD-MCNC: 7.4 G/DL — LOW (ref 13–17)
MAGNESIUM SERPL-MCNC: 1.8 MG/DL — SIGNIFICANT CHANGE UP (ref 1.6–2.6)
MCHC RBC-ENTMCNC: 26 PG — LOW (ref 27–34)
MCHC RBC-ENTMCNC: 31.5 GM/DL — LOW (ref 32–36)
MCV RBC AUTO: 82.5 FL — SIGNIFICANT CHANGE UP (ref 80–100)
NRBC # BLD: 0 /100 WBCS — SIGNIFICANT CHANGE UP (ref 0–0)
PHOSPHATE SERPL-MCNC: 2.6 MG/DL — SIGNIFICANT CHANGE UP (ref 2.5–4.5)
PLATELET # BLD AUTO: 153 K/UL — SIGNIFICANT CHANGE UP (ref 150–400)
POTASSIUM SERPL-MCNC: 3.5 MMOL/L — SIGNIFICANT CHANGE UP (ref 3.5–5.3)
POTASSIUM SERPL-SCNC: 3.5 MMOL/L — SIGNIFICANT CHANGE UP (ref 3.5–5.3)
PROT SERPL-MCNC: 4.3 G/DL — LOW (ref 6–8.3)
RBC # BLD: 2.85 M/UL — LOW (ref 4.2–5.8)
RBC # FLD: 17.5 % — HIGH (ref 10.3–14.5)
SODIUM SERPL-SCNC: 142 MMOL/L — SIGNIFICANT CHANGE UP (ref 135–145)
VANCOMYCIN TROUGH SERPL-MCNC: 12.4 UG/ML — SIGNIFICANT CHANGE UP (ref 10–20)
WBC # BLD: 11.37 K/UL — HIGH (ref 3.8–10.5)
WBC # FLD AUTO: 11.37 K/UL — HIGH (ref 3.8–10.5)

## 2020-01-11 RX ORDER — MAGNESIUM SULFATE 500 MG/ML
1 VIAL (ML) INJECTION ONCE
Refills: 0 | Status: COMPLETED | OUTPATIENT
Start: 2020-01-11 | End: 2020-01-11

## 2020-01-11 RX ORDER — FUROSEMIDE 40 MG
20 TABLET ORAL ONCE
Refills: 0 | Status: COMPLETED | OUTPATIENT
Start: 2020-01-11 | End: 2020-01-11

## 2020-01-11 RX ORDER — VANCOMYCIN HCL 1 G
1000 VIAL (EA) INTRAVENOUS ONCE
Refills: 0 | Status: COMPLETED | OUTPATIENT
Start: 2020-01-11 | End: 2020-01-11

## 2020-01-11 RX ORDER — POTASSIUM CHLORIDE 20 MEQ
10 PACKET (EA) ORAL
Refills: 0 | Status: COMPLETED | OUTPATIENT
Start: 2020-01-11 | End: 2020-01-11

## 2020-01-11 RX ADMIN — Medication 100 MILLIEQUIVALENT(S): at 11:56

## 2020-01-11 RX ADMIN — PIPERACILLIN AND TAZOBACTAM 200 GRAM(S): 4; .5 INJECTION, POWDER, LYOPHILIZED, FOR SOLUTION INTRAVENOUS at 00:27

## 2020-01-11 RX ADMIN — Medication 1: at 18:23

## 2020-01-11 RX ADMIN — PIPERACILLIN AND TAZOBACTAM 200 GRAM(S): 4; .5 INJECTION, POWDER, LYOPHILIZED, FOR SOLUTION INTRAVENOUS at 05:57

## 2020-01-11 RX ADMIN — HEPARIN SODIUM 5000 UNIT(S): 5000 INJECTION INTRAVENOUS; SUBCUTANEOUS at 17:25

## 2020-01-11 RX ADMIN — Medication 2: at 13:51

## 2020-01-11 RX ADMIN — PIPERACILLIN AND TAZOBACTAM 200 GRAM(S): 4; .5 INJECTION, POWDER, LYOPHILIZED, FOR SOLUTION INTRAVENOUS at 17:24

## 2020-01-11 RX ADMIN — INSULIN GLARGINE 10 UNIT(S): 100 INJECTION, SOLUTION SUBCUTANEOUS at 22:10

## 2020-01-11 RX ADMIN — Medication 250 MILLIGRAM(S): at 17:37

## 2020-01-11 RX ADMIN — SODIUM CHLORIDE 50 MILLILITER(S): 9 INJECTION, SOLUTION INTRAVENOUS at 17:37

## 2020-01-11 RX ADMIN — Medication 100 MILLIEQUIVALENT(S): at 13:53

## 2020-01-11 RX ADMIN — Medication 100 GRAM(S): at 11:56

## 2020-01-11 RX ADMIN — HEPARIN SODIUM 5000 UNIT(S): 5000 INJECTION INTRAVENOUS; SUBCUTANEOUS at 05:57

## 2020-01-11 RX ADMIN — Medication 2: at 09:25

## 2020-01-11 RX ADMIN — Medication 90 MICROGRAM(S): at 05:57

## 2020-01-11 RX ADMIN — Medication 100 MILLIEQUIVALENT(S): at 14:30

## 2020-01-11 RX ADMIN — Medication 20 MILLIGRAM(S): at 14:31

## 2020-01-11 RX ADMIN — CLOPIDOGREL BISULFATE 75 MILLIGRAM(S): 75 TABLET, FILM COATED ORAL at 09:20

## 2020-01-11 RX ADMIN — PIPERACILLIN AND TAZOBACTAM 200 GRAM(S): 4; .5 INJECTION, POWDER, LYOPHILIZED, FOR SOLUTION INTRAVENOUS at 11:57

## 2020-01-11 RX ADMIN — SIMVASTATIN 20 MILLIGRAM(S): 20 TABLET, FILM COATED ORAL at 22:19

## 2020-01-11 NOTE — PROGRESS NOTE ADULT - SUBJECTIVE AND OBJECTIVE BOX
[facility] FOR DR SAINI    CC/ HPI Patient is a 89 year old [place]ae[place] male with CAD, HTN, PVD, LLE vascular procedure admitted with severe sepsis secondary to aspiration pneumonia, dysphagia  He is in bed on vancomycin and pip/tazo. His breathing is not comfortable. labored, and he uses accessory muscles.  Only 500 mL of urine since 7 am. He is receiving 100 mL/h D5W.     PAST MEDICAL & SURGICAL HISTORY:  PVD (peripheral vascular disease)  CAD (coronary artery disease)  Hyperlipidemia  Hypertension  Diabetes  PVD (peripheral vascular disease): s/p stent placement in left leg 3 months ago    SOCHX:   -  alcohol - tob    FMHX: FA/MO  - contributory     ROS reviewed below with positive findings marked (+) :  GEN:  fever, chills ENT: tracheostomy,   epistaxis,  sinusitis COR: +CAD, CHF,  +HTN, dysrhythmia PUL: COPD, ILD, asthma, pneumonia GI: PEG, dysphagia, hemorrhage, other MANDO: kidney disease, electrolyte disorder HEM:  anemia, thrombus, coagulopathy, cancer ENDO:  thyroid disease,+ diabetes mellitus CNS:  dementia, stroke, seizure, PSY:  depression, anxiety, other    MEDICATIONS  (STANDING):  clopidogrel Tablet 75 milliGRAM(s) Oral daily  dextrose 5%. 1000 milliLiter(s) (100 mL/Hr) IV Continuous <Continuous>  dextrose 5%. 1000 milliLiter(s) (50 mL/Hr) IV Continuous <Continuous>  dextrose 50% Injectable 12.5 Gram(s) IV Push once  dextrose 50% Injectable 25 Gram(s) IV Push once  dextrose 50% Injectable 25 Gram(s) IV Push once  heparin  Injectable 5000 Unit(s) SubCutaneous every 12 hours  insulin glargine Injectable (LANTUS) 10 Unit(s) SubCutaneous at bedtime  insulin lispro (HumaLOG) corrective regimen sliding scale   SubCutaneous Before meals and at bedtime  levothyroxine Injectable 90 MICROGram(s) IV Push <User Schedule>  piperacillin/tazobactam IVPB.. 2.25 Gram(s) IV Intermittent every 6 hours  simvastatin 20 milliGRAM(s) Oral at bedtime    MEDICATIONS  (PRN):  dextrose 40% Gel 15 Gram(s) Oral once PRN Blood Glucose LESS THAN 70 milliGRAM(s)/deciliter  glucagon  Injectable 1 milliGRAM(s) IntraMuscular once PRN Glucose LESS THAN 70 milligrams/deciliter      Vital Signs Last 24 Hrs  T(C): 37.1 (10 Cedric 2020 13:11), Max: 37.1 (10 Cedric 2020 13:11)  T(F): 98.7 (10 Cedric 2020 13:11), Max: 98.7 (10 Cedric 2020 13:11)  HR: 64 (10 Cedric 2020 15:30) (61 - 78)  BP: 107/63 (10 Cedric 2020 13:11) (98/58 - 133/84)  RR: 32 (10 Cedric 2020 13:11) (17 - 32)  SpO2: 93% (10 Cedric 2020 17:42) (92% - 100%)    GENERAL:         uncomfortable,  + distress, aid at bedside.  HEENT:            - trauma,  - icterus,  - injection,  - nasal discharge.  NECK:              - jugular venous distention, - thyromegaly.  LYMPH:           - lymphadenopathy, - masses.  RESP:             + crackles, decreased breath sounds bilateral, accessory muscle use and tachypnea,  - rhonchi,   - wheezes.   COR:                S1S2   - gallops,  - rubs.  ABD:                bowel sounds,   soft, - tender, - distended, + BOSE.  EXT/MSC:         - cyanosis,  - clubbing, - edema, feet with gauze.    NEURO:           + awake earlier, now sleeping                     7.9    14.81 )-----------( 162      ( 10 Cedric 2020 06:36 )             25.1     01-10    144  |  117<H>  |  51<H>  ----------------------------<  228<H>  3.5   |  17<L>  |  1.32<H>        CT Chest No Cont (01.08.20)  8.8 x 5.9 x 5.2 cm left lower lobe ovoid area of air/fluid with surrounding consolidation compatible with a pulmonary abscess versus necrotizing pneumonia. Aspiration pneumonia, pulmonary infarction or necrotic neoplasm are included in the differential diagnosis.  Large bilateral pleural effusions right greater than left. Areas of nodular pleural thickening in the left lung base consistent with empyema versus malignant pleural effusion. Near complete left lower lobe and complete right lower lobe collapse. There are areas of low attenuation within the right lower lobe collapse consistent with pulmonary nodules versus pneumonia. Small ground-glass infiltrates in the upper lobes right greater than left.     ASSESSMENT/PLAN    1) Pneumonia, necrotizing versus abscess  2) Pleural effusion  3) Atelectasis  4) Respiratory failure, impending  5) Aspiration is presumed  6) Decubiti  7) Malnourishment, cachexia, hypoalbuminemia    Leukocytosis improved, satisfactory SpO2 on nasal cannula  Bronchodilators:  Atrovent/ albuterol q 4 – 6 hours as needed  Pigtail catheters at his age would have more risks than benefit  ID/Antibiotics:  piperacillin/tazobactam, vancomycin  Cardiac/HTN: STAT lasix, monitor response  GI: Rx/ prophylaxis c PPI/H2B  Heme: Rx/VT prophylaxis  Discussed with medical team, ID, PGY, aid.  BiPAP at bedside  DNR/DNI to be discussed  Feeding tube to be discussed

## 2020-01-11 NOTE — PROGRESS NOTE ADULT - SUBJECTIVE AND OBJECTIVE BOX
INTERVAL HPI/OVERNIGHT EVENTS:    ANTIBIOTICS/RELEVANT:    MEDICATIONS  (STANDING):  clopidogrel Tablet 75 milliGRAM(s) Oral daily  dextrose 5%. 1000 milliLiter(s) (100 mL/Hr) IV Continuous <Continuous>  dextrose 5%. 1000 milliLiter(s) (50 mL/Hr) IV Continuous <Continuous>  dextrose 50% Injectable 12.5 Gram(s) IV Push once  dextrose 50% Injectable 25 Gram(s) IV Push once  dextrose 50% Injectable 25 Gram(s) IV Push once  heparin  Injectable 5000 Unit(s) SubCutaneous every 12 hours  insulin glargine Injectable (LANTUS) 10 Unit(s) SubCutaneous at bedtime  insulin lispro (HumaLOG) corrective regimen sliding scale   SubCutaneous Before meals and at bedtime  levothyroxine Injectable 90 MICROGram(s) IV Push <User Schedule>  magnesium sulfate  IVPB 1 Gram(s) IV Intermittent once  piperacillin/tazobactam IVPB.. 2.25 Gram(s) IV Intermittent every 6 hours  potassium chloride  10 mEq/100 mL IVPB 10 milliEquivalent(s) IV Intermittent every 1 hour  simvastatin 20 milliGRAM(s) Oral at bedtime    MEDICATIONS  (PRN):  dextrose 40% Gel 15 Gram(s) Oral once PRN Blood Glucose LESS THAN 70 milliGRAM(s)/deciliter  glucagon  Injectable 1 milliGRAM(s) IntraMuscular once PRN Glucose LESS THAN 70 milligrams/deciliter      Allergies    No Known Allergies    Intolerances        Vital Signs Last 24 Hrs  T(C): 36.6 (11 Jan 2020 09:16), Max: 37.1 (10 Cedric 2020 13:11)  T(F): 97.9 (11 Jan 2020 09:16), Max: 98.7 (10 Cedric 2020 13:11)  HR: 73 (11 Jan 2020 09:16) (64 - 79)  BP: 95/53 (11 Jan 2020 09:16) (95/53 - 108/63)  BP(mean): --  RR: 16 (11 Jan 2020 09:16) (16 - 32)  SpO2: 94% (11 Jan 2020 09:16) (90% - 100%)    PHYSICAL EXAM:      Constitutional:    Eyes:    ENMT:    Neck:    Breasts:    Back:    Respiratory:    Cardiovascular:    Gastrointestinal:    Genitourinary:    Rectal:    Extremities:    Vascular:    Neurological:      LABS:                        7.4    11.37 )-----------( 153      ( 11 Jan 2020 08:31 )             23.5     01-11    142  |  108  |  39<H>  ----------------------------<  221<H>  3.5   |  19<L>  |  1.26    Ca    7.2<L>      11 Jan 2020 08:31  Phos  2.6     01-11  Mg     1.8     01-11    TPro  4.3<L>  /  Alb  1.3<L>  /  TBili  1.3<H>  /  DBili  x   /  AST  50<H>  /  ALT  38  /  AlkPhos  101  01-11    Vancomycin Level, Trough (01.10.20 @ 15:27)    Vancomycin Level, Trough: 5.4: Vancomycin trough levels should be rapidly reached and maintained at  15-20 ug/ml for life threatening MRSA  infections such as sepsis, endocarditis, osteomyelitis and pneumonia. A  first trough level should be drawn  before the 3rd or 4th dose.  Risk of renal toxicity is increased for levels >15 ug/ml, in patients on  other nephrotoxic drugs, who are  hemodynamically unstable, have unstable renal function, or are on  Vancomycin therapy for >14 days. Renal function with  creatinine levels should be monitored for those patients. ug/mL          MICROBIOLOGY:    RADIOLOGY & ADDITIONAL STUDIES: INTERVAL HPI/OVERNIGHT EVENTS:    Clinically improved  NPO, though    MEDICATIONS  (STANDING):  clopidogrel Tablet 75 milliGRAM(s) Oral daily  dextrose 5%. 1000 milliLiter(s) (100 mL/Hr) IV Continuous <Continuous>  dextrose 5%. 1000 milliLiter(s) (50 mL/Hr) IV Continuous <Continuous>  dextrose 50% Injectable 12.5 Gram(s) IV Push once  dextrose 50% Injectable 25 Gram(s) IV Push once  dextrose 50% Injectable 25 Gram(s) IV Push once  heparin  Injectable 5000 Unit(s) SubCutaneous every 12 hours  insulin glargine Injectable (LANTUS) 10 Unit(s) SubCutaneous at bedtime  insulin lispro (HumaLOG) corrective regimen sliding scale   SubCutaneous Before meals and at bedtime  levothyroxine Injectable 90 MICROGram(s) IV Push <User Schedule>  magnesium sulfate  IVPB 1 Gram(s) IV Intermittent once  piperacillin/tazobactam IVPB.. 2.25 Gram(s) IV Intermittent every 6 hours  potassium chloride  10 mEq/100 mL IVPB 10 milliEquivalent(s) IV Intermittent every 1 hour  simvastatin 20 milliGRAM(s) Oral at bedtime    MEDICATIONS  (PRN):  dextrose 40% Gel 15 Gram(s) Oral once PRN Blood Glucose LESS THAN 70 milliGRAM(s)/deciliter  glucagon  Injectable 1 milliGRAM(s) IntraMuscular once PRN Glucose LESS THAN 70 milligrams/deciliter      Allergies    No Known Allergies      Vital Signs Last 24 Hrs  T(C): 36.6 (11 Jan 2020 09:16), Max: 37.1 (10 Cedric 2020 13:11)  T(F): 97.9 (11 Jan 2020 09:16), Max: 98.7 (10 Cedric 2020 13:11)  HR: 73 (11 Jan 2020 09:16) (64 - 79)  BP: 95/53 (11 Jan 2020 09:16) (95/53 - 108/63)  BP(mean): --  RR: 16 (11 Jan 2020 09:16) (16 - 32)  SpO2: 94% (11 Jan 2020 09:16) (90% - 100%)  On NC O2  More awake and alert  No rash  RR  Chest decreased BSs at bases  Abd soft NT    LABS:                        7.4    11.37 )-----------( 153      ( 11 Jan 2020 08:31 )             23.5     01-11    142  |  108  |  39<H>  ----------------------------<  221<H>  3.5   |  19<L>  |  1.26    Ca    7.2<L>      11 Jan 2020 08:31  Phos  2.6     01-11  Mg     1.8     01-11    TPro  4.3<L>  /  Alb  1.3<L>  /  TBili  1.3<H>  /  DBili  x   /  AST  50<H>  /  ALT  38  /  AlkPhos  101  01-11    Vancomycin Level, Trough (01.10.20 @ 15:27)    Vancomycin Level, Trough: 5.4: Vancomycin trough levels should be rapidly reached and maintained at  15-20 ug/ml for life threatening MRSA  infections such as sepsis, endocarditis, osteomyelitis and pneumonia. A  first trough level should be drawn  before the 3rd or 4th dose.  Risk of renal toxicity is increased for levels >15 ug/ml, in patients on  other nephrotoxic drugs, who are  hemodynamically unstable, have unstable renal function, or are on  Vancomycin therapy for >14 days. Renal function with  creatinine levels should be monitored for those patients. ug/mL

## 2020-01-11 NOTE — PROVIDER CONTACT NOTE (OTHER) - SITUATION
Name: Lam Viramontes ADMIT: 10/10/2018   : 1930  PCP: Suman Jaimes III, MD    MRN: [id number] LOS: 1 days   AGE/SEX: 88 y.o. male  ROOM: [facility]   Subjective   CC: abdominal discomfort  No acute events.  Abdominal pain has improved.  Dyspnea is chronic and stable.  Not getting much from suctioning per RT.  No f/c/n/v/d.  Tolerating TF.      Objective   Vital Signs  Temp:  [97.9 °F (36.6 °C)-98.4 °F (36.9 °C)] 98.1 °F (36.7 °C)  Heart Rate:  [] 89  Resp:  [16-20] 16  BP: (107-141)/(66-87) 107/75  SpO2:  [96 %-100 %] 99 %  on  Flow (L/min):  [7-8] 8;   Device (Oxygen Therapy): T - piece  Body mass index is 21.68 kg/m².    Physical Exam   Constitutional: No distress.   HENT:   Head: Normocephalic and atraumatic.   Mouth/Throat: Oropharynx is clear and moist.   Tracheostomy in place with t-piece   Eyes: Pupils are equal, round, and reactive to light. Conjunctivae and EOM are normal.   Neck: Normal range of motion. Neck supple. No JVD present.   Cardiovascular: Normal rate, regular rhythm and intact distal pulses.    Pulmonary/Chest: Effort normal. He has decreased breath sounds in the right lower field and the left lower field.   Coarse breath sounds   Abdominal: Soft. Bowel sounds are normal. There is no tenderness.   PEG tube in place   Musculoskeletal: He exhibits no edema or tenderness.   Neurological: He is alert.   Skin: Skin is warm and dry. He is not diaphoretic.   Psychiatric: He has a normal mood and affect. His behavior is normal.   Nursing note and vitals reviewed.      Results Review:       I reviewed the patient's new clinical results.    Results from last 7 days  Lab Units 10/11/18  0759 10/10/18  1334   WBC 10*3/mm3 7.68 6.71   HEMOGLOBIN g/dL 9.5* 11.0*   PLATELETS 10*3/mm3 333 402     Results from last 7 days  Lab Units 10/11/18  0759 10/10/18  1334   SODIUM mmol/L 137 137   POTASSIUM mmol/L 4.1 4.4   CHLORIDE mmol/L 100 94*   CO2 mmol/L 29.3* 34.8*   BUN mg/dL 20 27*   CREATININE  mg/dL 0.80 0.85   GLUCOSE mg/dL 151* 88   Estimated Creatinine Clearance: 60.1 mL/min (by C-G formula based on SCr of 0.8 mg/dL).  Results from last 7 days  Lab Units 10/11/18  0759 10/10/18  1334   CALCIUM mg/dL 8.9 9.9   ALBUMIN g/dL  --  3.40*   MAGNESIUM mg/dL  --  2.4         apixaban 5 mg Per G Tube Q12H   atorvastatin 40 mg Per G Tube Daily   budesonide 0.5 mg Nebulization BID - RT   doxazosin 2 mg Per G Tube Q24H   insulin aspart 0-7 Units Subcutaneous 4x Daily With Meals & Nightly   ipratropium-albuterol 3 mL Nebulization 4x Daily - RT   lacosamide 100 mg Per G Tube Q12H   levETIRAcetam 1,500 mg Per G Tube Q12H   levothyroxine 75 mcg Per G Tube Q AM   linagliptin 5 mg Per G Tube Daily   midodrine 10 mg Per G Tube TID   piperacillin-tazobactam 4.5 g Intravenous Q8H   polyethylene glycol 17 g Per G Tube Daily   sodium chloride 3 mL Intravenous Q12H   vancomycin 750 mg Intravenous Q12H       Pharmacy to dose vancomycin     Pharmacy to Dose Zosyn     sodium chloride 125 mL/hr Last Rate: 125 mL/hr (10/11/18 1623)   NPO Diet      Assessment/Plan      Active Hospital Problems    Diagnosis Date Noted   • Healthcare-associated pneumonia [J18.9] 10/10/2018   • Chronic indwelling Chandler catheter [Z92.89] 10/10/2018   • Cystitis [N30.90] 10/10/2018   • Tracheostomy status (CMS/[facility]) [Z93.0] 09/15/2018   • Seizures (CMS/[facility]) [R56.9] 09/15/2018   • PAF (paroxysmal atrial fibrillation) (CMS/[facility]) [I48.0] 09/15/2018   • Neuromuscular dysfunction of bladder [N31.9] 09/15/2018   • Hypothyroidism [E03.9] 09/15/2018   • Diabetes type 2, controlled (CMS/[facility]) [E11.9] 09/15/2018   • Anticoagulated [Z79.01] 09/15/2018   • PEG (percutaneous endoscopic gastrostomy) status (CMS/[facility]) [Z93.1] 12/29/2017   • Hypertension [I10] 12/29/2017      Resolved Hospital Problems    Diagnosis Date Noted Date Resolved   No resolved problems to display.   Abdominal Discomfort  - likely related to constipation, no other significant abdominal findings on  CT  - start on bowel regimen  - tolerating TF well    Abnormal Chest Imaging  - ?atelectasis vs pneumonia in LLL which is new from last month  - procalcitonin is low, WBC normal, and patient is afebrile  - send respiratory culture and MRSA screen, RVP is negative  - will de-escalate abx soon if above is negative    Neurogenic Bladder  - has chronic clarke catheter  - no urinary complaints, no urine cx indicated  - urology has seen, appreciate recs    Type 2 DM  - a1c 6.50%  - continue linagliptin   - cover with ssi    DVT Prophylaxis  - on eliquis for afib      Adiel Burrell MD  [facility]  10/11/18  5:59 PM   Pt BP is 100/48. Pt is asymptomatic

## 2020-01-11 NOTE — PROGRESS NOTE ADULT - ASSESSMENT
Continue Pip-Tazo  Continue IV Vanco per level.  Target trough around 15 Necrotizing pneumonia vs evolving abscess  Suspect repeated aspirations  Leukocytosis improving with escalated antibiotics      RECOMMEND  Continue Pip-Tazo  Continue IV Vanco per level.  Target trough around 15  Nutritional support

## 2020-01-11 NOTE — PROGRESS NOTE ADULT - SUBJECTIVE AND OBJECTIVE BOX
Pt seen and examined   looks comfortable  WBC better  responds but unable to communicate    REVIEW OF SYSTEMS:  unable      MEDICATIONS:  MEDICATIONS  (STANDING):  clopidogrel Tablet 75 milliGRAM(s) Oral daily  dextrose 5%. 1000 milliLiter(s) (100 mL/Hr) IV Continuous <Continuous>  dextrose 5%. 1000 milliLiter(s) (50 mL/Hr) IV Continuous <Continuous>  dextrose 50% Injectable 12.5 Gram(s) IV Push once  dextrose 50% Injectable 25 Gram(s) IV Push once  dextrose 50% Injectable 25 Gram(s) IV Push once  heparin  Injectable 5000 Unit(s) SubCutaneous every 12 hours  insulin glargine Injectable (LANTUS) 10 Unit(s) SubCutaneous at bedtime  insulin lispro (HumaLOG) corrective regimen sliding scale   SubCutaneous Before meals and at bedtime  levothyroxine Injectable 90 MICROGram(s) IV Push <User Schedule>  magnesium sulfate  IVPB 1 Gram(s) IV Intermittent once  piperacillin/tazobactam IVPB.. 2.25 Gram(s) IV Intermittent every 6 hours  potassium chloride  10 mEq/100 mL IVPB 10 milliEquivalent(s) IV Intermittent every 1 hour  simvastatin 20 milliGRAM(s) Oral at bedtime    MEDICATIONS  (PRN):  dextrose 40% Gel 15 Gram(s) Oral once PRN Blood Glucose LESS THAN 70 milliGRAM(s)/deciliter  glucagon  Injectable 1 milliGRAM(s) IntraMuscular once PRN Glucose LESS THAN 70 milligrams/deciliter      Allergies    No Known Allergies    Intolerances        Vital Signs Last 24 Hrs  T(C): 36.6 (11 Jan 2020 09:16), Max: 37.1 (10 Cedric 2020 13:11)  T(F): 97.9 (11 Jan 2020 09:16), Max: 98.7 (10 Cedric 2020 13:11)  HR: 73 (11 Jan 2020 09:16) (64 - 79)  BP: 95/53 (11 Jan 2020 09:16) (95/53 - 108/63)  BP(mean): --  RR: 16 (11 Jan 2020 09:16) (16 - 32)  SpO2: 94% (11 Jan 2020 09:16) (90% - 100%)    01-10 @ 07:01  -  01-11 @ 07:00  --------------------------------------------------------  IN: 0 mL / OUT: 300 mL / NET: -300 mL        PHYSICAL EXAM:    General: thin  HEENT: MMM, conjunctiva and sclera clear  Lungs: dec RLL or absent, rest is clear  Heart: regular  Gastrointestinal: Soft non-tender non-distended; Normal bowel sounds;  Skin: Warm and dry. No obvious rash    LABS:      CBC Full  -  ( 11 Jan 2020 08:31 )  WBC Count : 11.37 K/uL  RBC Count : 2.85 M/uL  Hemoglobin : 7.4 g/dL  Hematocrit : 23.5 %  Platelet Count - Automated : 153 K/uL  Mean Cell Volume : 82.5 fl  Mean Cell Hemoglobin : 26.0 pg  Mean Cell Hemoglobin Concentration : 31.5 gm/dL  Auto Neutrophil # : x  Auto Lymphocyte # : x  Auto Monocyte # : x  Auto Eosinophil # : x  Auto Basophil # : x  Auto Neutrophil % : x  Auto Lymphocyte % : x  Auto Monocyte % : x  Auto Eosinophil % : x  Auto Basophil % : x    01-11    142  |  108  |  39<H>  ----------------------------<  221<H>  3.5   |  19<L>  |  1.26    Ca    7.2<L>      11 Jan 2020 08:31  Phos  2.6     01-11  Mg     1.8     01-11    TPro  4.3<L>  /  Alb  1.3<L>  /  TBili  1.3<H>  /  DBili  x   /  AST  50<H>  /  ALT  38  /  AlkPhos  101  01-11                      RADIOLOGY & ADDITIONAL STUDIES (The following images were personally reviewed):

## 2020-01-12 LAB
ANION GAP SERPL CALC-SCNC: 11 MMOL/L — SIGNIFICANT CHANGE UP (ref 5–17)
ANION GAP SERPL CALC-SCNC: 9 MMOL/L — SIGNIFICANT CHANGE UP (ref 5–17)
BLD GP AB SCN SERPL QL: NEGATIVE — SIGNIFICANT CHANGE UP
BUN SERPL-MCNC: 27 MG/DL — HIGH (ref 7–23)
BUN SERPL-MCNC: 29 MG/DL — HIGH (ref 7–23)
CALCIUM SERPL-MCNC: 7 MG/DL — LOW (ref 8.4–10.5)
CALCIUM SERPL-MCNC: 7.2 MG/DL — LOW (ref 8.4–10.5)
CHLORIDE SERPL-SCNC: 105 MMOL/L — SIGNIFICANT CHANGE UP (ref 96–108)
CHLORIDE SERPL-SCNC: 108 MMOL/L — SIGNIFICANT CHANGE UP (ref 96–108)
CO2 SERPL-SCNC: 19 MMOL/L — LOW (ref 22–31)
CO2 SERPL-SCNC: 20 MMOL/L — LOW (ref 22–31)
CREAT SERPL-MCNC: 1.26 MG/DL — SIGNIFICANT CHANGE UP (ref 0.5–1.3)
CREAT SERPL-MCNC: 1.3 MG/DL — SIGNIFICANT CHANGE UP (ref 0.5–1.3)
GLUCOSE BLDC GLUCOMTR-MCNC: 132 MG/DL — HIGH (ref 70–99)
GLUCOSE BLDC GLUCOMTR-MCNC: 145 MG/DL — HIGH (ref 70–99)
GLUCOSE BLDC GLUCOMTR-MCNC: 149 MG/DL — HIGH (ref 70–99)
GLUCOSE BLDC GLUCOMTR-MCNC: 189 MG/DL — HIGH (ref 70–99)
GLUCOSE SERPL-MCNC: 154 MG/DL — HIGH (ref 70–99)
GLUCOSE SERPL-MCNC: 176 MG/DL — HIGH (ref 70–99)
HCT VFR BLD CALC: 27.3 % — LOW (ref 39–50)
HGB BLD-MCNC: 8.2 G/DL — LOW (ref 13–17)
MAGNESIUM SERPL-MCNC: 1.8 MG/DL — SIGNIFICANT CHANGE UP (ref 1.6–2.6)
MAGNESIUM SERPL-MCNC: 2 MG/DL — SIGNIFICANT CHANGE UP (ref 1.6–2.6)
MCHC RBC-ENTMCNC: 25.2 PG — LOW (ref 27–34)
MCHC RBC-ENTMCNC: 30 GM/DL — LOW (ref 32–36)
MCV RBC AUTO: 84 FL — SIGNIFICANT CHANGE UP (ref 80–100)
NRBC # BLD: 0 /100 WBCS — SIGNIFICANT CHANGE UP (ref 0–0)
PHOSPHATE SERPL-MCNC: 3.2 MG/DL — SIGNIFICANT CHANGE UP (ref 2.5–4.5)
PLATELET # BLD AUTO: 174 K/UL — SIGNIFICANT CHANGE UP (ref 150–400)
POTASSIUM SERPL-MCNC: 3.6 MMOL/L — SIGNIFICANT CHANGE UP (ref 3.5–5.3)
POTASSIUM SERPL-MCNC: 3.8 MMOL/L — SIGNIFICANT CHANGE UP (ref 3.5–5.3)
POTASSIUM SERPL-SCNC: 3.6 MMOL/L — SIGNIFICANT CHANGE UP (ref 3.5–5.3)
POTASSIUM SERPL-SCNC: 3.8 MMOL/L — SIGNIFICANT CHANGE UP (ref 3.5–5.3)
RBC # BLD: 3.25 M/UL — LOW (ref 4.2–5.8)
RBC # FLD: 17.1 % — HIGH (ref 10.3–14.5)
RH IG SCN BLD-IMP: POSITIVE — SIGNIFICANT CHANGE UP
SODIUM SERPL-SCNC: 135 MMOL/L — SIGNIFICANT CHANGE UP (ref 135–145)
SODIUM SERPL-SCNC: 137 MMOL/L — SIGNIFICANT CHANGE UP (ref 135–145)
VANCOMYCIN TROUGH SERPL-MCNC: 14 UG/ML — SIGNIFICANT CHANGE UP (ref 10–20)
WBC # BLD: 12.46 K/UL — HIGH (ref 3.8–10.5)
WBC # FLD AUTO: 12.46 K/UL — HIGH (ref 3.8–10.5)

## 2020-01-12 RX ORDER — FUROSEMIDE 40 MG
20 TABLET ORAL ONCE
Refills: 0 | Status: COMPLETED | OUTPATIENT
Start: 2020-01-12 | End: 2020-01-12

## 2020-01-12 RX ORDER — VANCOMYCIN HCL 1 G
1000 VIAL (EA) INTRAVENOUS ONCE
Refills: 0 | Status: COMPLETED | OUTPATIENT
Start: 2020-01-12 | End: 2020-01-12

## 2020-01-12 RX ORDER — MAGNESIUM SULFATE 500 MG/ML
1 VIAL (ML) INJECTION ONCE
Refills: 0 | Status: COMPLETED | OUTPATIENT
Start: 2020-01-12 | End: 2020-01-12

## 2020-01-12 RX ORDER — POTASSIUM CHLORIDE 20 MEQ
20 PACKET (EA) ORAL ONCE
Refills: 0 | Status: DISCONTINUED | OUTPATIENT
Start: 2020-01-12 | End: 2020-01-13

## 2020-01-12 RX ORDER — POTASSIUM CHLORIDE 20 MEQ
10 PACKET (EA) ORAL
Refills: 0 | Status: COMPLETED | OUTPATIENT
Start: 2020-01-12 | End: 2020-01-12

## 2020-01-12 RX ADMIN — Medication 100 GRAM(S): at 13:01

## 2020-01-12 RX ADMIN — CLOPIDOGREL BISULFATE 75 MILLIGRAM(S): 75 TABLET, FILM COATED ORAL at 11:00

## 2020-01-12 RX ADMIN — PIPERACILLIN AND TAZOBACTAM 200 GRAM(S): 4; .5 INJECTION, POWDER, LYOPHILIZED, FOR SOLUTION INTRAVENOUS at 23:45

## 2020-01-12 RX ADMIN — Medication 90 MICROGRAM(S): at 06:19

## 2020-01-12 RX ADMIN — HEPARIN SODIUM 5000 UNIT(S): 5000 INJECTION INTRAVENOUS; SUBCUTANEOUS at 18:00

## 2020-01-12 RX ADMIN — Medication 1: at 22:02

## 2020-01-12 RX ADMIN — SODIUM CHLORIDE 50 MILLILITER(S): 9 INJECTION, SOLUTION INTRAVENOUS at 17:56

## 2020-01-12 RX ADMIN — HEPARIN SODIUM 5000 UNIT(S): 5000 INJECTION INTRAVENOUS; SUBCUTANEOUS at 06:00

## 2020-01-12 RX ADMIN — PIPERACILLIN AND TAZOBACTAM 200 GRAM(S): 4; .5 INJECTION, POWDER, LYOPHILIZED, FOR SOLUTION INTRAVENOUS at 12:00

## 2020-01-12 RX ADMIN — Medication 100 MILLIEQUIVALENT(S): at 13:01

## 2020-01-12 RX ADMIN — Medication 250 MILLIGRAM(S): at 16:26

## 2020-01-12 RX ADMIN — PIPERACILLIN AND TAZOBACTAM 200 GRAM(S): 4; .5 INJECTION, POWDER, LYOPHILIZED, FOR SOLUTION INTRAVENOUS at 06:00

## 2020-01-12 RX ADMIN — INSULIN GLARGINE 10 UNIT(S): 100 INJECTION, SOLUTION SUBCUTANEOUS at 22:03

## 2020-01-12 RX ADMIN — Medication 20 MILLIGRAM(S): at 13:01

## 2020-01-12 RX ADMIN — SIMVASTATIN 20 MILLIGRAM(S): 20 TABLET, FILM COATED ORAL at 22:04

## 2020-01-12 RX ADMIN — Medication 100 MILLIEQUIVALENT(S): at 14:00

## 2020-01-12 RX ADMIN — PIPERACILLIN AND TAZOBACTAM 200 GRAM(S): 4; .5 INJECTION, POWDER, LYOPHILIZED, FOR SOLUTION INTRAVENOUS at 18:00

## 2020-01-12 RX ADMIN — PIPERACILLIN AND TAZOBACTAM 200 GRAM(S): 4; .5 INJECTION, POWDER, LYOPHILIZED, FOR SOLUTION INTRAVENOUS at 00:54

## 2020-01-12 RX ADMIN — Medication 100 MILLIEQUIVALENT(S): at 15:26

## 2020-01-12 NOTE — PROGRESS NOTE ADULT - PROBLEM SELECTOR PLAN 2
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  - vascular surgery said no surgical intervention, except dressing changes  - wound care: apply betadine to areas of gangrene/necrosis, wrap the feet with kerlex 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  - vascular surgery said no surgical intervention, except dressing changes  - wound care: apply betadine to areas of gangrene/necrosis, wrap the feet with kerlex    #Large Sacral Decub  - patient requires frequent changes in body position to alleviate pain, prevent contractures, and avoid respiratory infections in ways not feasible in an ordinary bed  - patient requires an alternating pressure mattress, the patient has severely limited mobility and cannot independently make changes in body positions

## 2020-01-12 NOTE — PROGRESS NOTE ADULT - PROBLEM SELECTOR PLAN 7
hx DM2. holding home doses of januvia 50mg and nateglinide 60mg   - ISS  -c/w lantus 10 U  -on D5 50cc/hr for nutrition while NPO  -monitor sugars

## 2020-01-12 NOTE — PROGRESS NOTE ADULT - SUBJECTIVE AND OBJECTIVE BOX
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  He is in bed on vancomycin and pip/tazo. His breathing is more comfortable than yesterday.  D5W is at 50 mL/hour.  He is able to say a few words "I am fine".  He is a new condom catheter.    PAST MEDICAL & SURGICAL HISTORY:  PVD (peripheral vascular disease)  CAD (coronary artery disease)  Hyperlipidemia  Hypertension  Diabetes  PVD (peripheral vascular disease): s/p stent placement in left leg 3 months ago    SOCHX:   -  alcohol - tob    FMHX: FA/MO  - contributory     ROS reviewed below with positive findings marked (+) :  GEN:  fever, chills ENT: tracheostomy,   epistaxis,  sinusitis COR: +CAD, CHF,  +HTN, dysrhythmia PUL: COPD, ILD, asthma, pneumonia GI: PEG, dysphagia, hemorrhage, other MANDO: kidney disease, electrolyte disorder HEM:  anemia, thrombus, coagulopathy, cancer ENDO:  thyroid disease,+ diabetes mellitus CNS:  dementia, stroke, seizure, PSY:  depression, anxiety, other    MEDICATIONS  (STANDING):  clopidogrel Tablet 75 milliGRAM(s) Oral daily  dextrose 5%. 1000 milliLiter(s) (100 mL/Hr) IV Continuous <Continuous>  dextrose 5%. 1000 milliLiter(s) (50 mL/Hr) IV Continuous <Continuous>  dextrose 50% Injectable 12.5 Gram(s) IV Push once  dextrose 50% Injectable 25 Gram(s) IV Push once  dextrose 50% Injectable 25 Gram(s) IV Push once  heparin  Injectable 5000 Unit(s) SubCutaneous every 12 hours  insulin glargine Injectable (LANTUS) 10 Unit(s) SubCutaneous at bedtime  insulin lispro (HumaLOG) corrective regimen sliding scale   SubCutaneous Before meals and at bedtime  levothyroxine Injectable 90 MICROGram(s) IV Push <User Schedule>  piperacillin/tazobactam IVPB.. 2.25 Gram(s) IV Intermittent every 6 hours  simvastatin 20 milliGRAM(s) Oral at bedtime    MEDICATIONS  (PRN):  dextrose 40% Gel 15 Gram(s) Oral once PRN Blood Glucose LESS THAN 70 milliGRAM(s)/deciliter  glucagon  Injectable 1 milliGRAM(s) IntraMuscular once PRN Glucose LESS THAN 70 milligrams/deciliter      Vital Signs Last 24 Hrs  T(C): 37.1 (10 Cedric 2020 13:11), Max: 37.1 (10 Cedric 2020 13:11)  T(F): 98.7 (10 Cedric 2020 13:11), Max: 98.7 (10 Cedric 2020 13:11)  HR: 64 (10 Cedric 2020 15:30) (61 - 78)  BP: 107/63 (10 Cedric 2020 13:11) (98/58 - 133/84)  RR: 32 (10 Cedric 2020 13:11) (17 - 32)  SpO2: 93% (10 Cedric 2020 17:42) (92% - 100%)    daughter at bedside, HCP  GENERAL:         more comfortable,  - distress.  HEENT:            - trauma,  - icterus,  - injection,  - nasal discharge.  NECK:              - jugular venous distention, - thyromegaly.  LYMPH:           - lymphadenopathy, - masses.  RESP:             + crackles, decreased breath sounds bilateral, accessory muscle use and tachypnea,  - rhonchi,   - wheezes.   COR:                S1S2   - gallops,  - rubs.  ABD:                bowel sounds,   soft, - tender, - distended, + [place], scrotal edema, sacral decubital ulcer.  EXT/MSC:         - cyanosis,  - clubbing, - edema, feet with gauze.    NEURO:           + awake earlier, now sleeping                     7.9    14.81 )-----------( 162      ( 10 Cedric 2020 06:36 )             25.1     01-10    144  |  117<H>  |  51<H>  ----------------------------<  228<H>  3.5   |  17<L>  |  1.32<H>        CT Chest No Cont (01.08.20)  8.8 x 5.9 x 5.2 cm left lower lobe ovoid area of air/fluid with surrounding consolidation compatible with a pulmonary abscess versus necrotizing pneumonia. Aspiration pneumonia, pulmonary infarction or necrotic neoplasm are included in the differential diagnosis.  Large bilateral pleural effusions right greater than left. Areas of nodular pleural thickening in the left lung base consistent with empyema versus malignant pleural effusion. Near complete left lower lobe and complete right lower lobe collapse. There are areas of low attenuation within the right lower lobe collapse consistent with pulmonary nodules versus pneumonia. Small ground-glass infiltrates in the upper lobes right greater than left.     ASSESSMENT/PLAN    1) Pneumonia, necrotizing versus abscess  2) Pleural effusion  3) Atelectasis  4) Respiratory failure  5) Aspiration is presumed  6) Decubiti  7) Malnourishment, cachexia, hypoalbuminemia    Leukocytosis improving, satisfactory SpO2 on nasal cannula  Bronchodilators:  Atrovent/ albuterol q 4 – 6 hours as needed  Pigtail catheters at his age would have more risks than benefit  ID/Antibiotics:  piperacillin/tazobactam, vancomycin  Cardiac/HTN: continue lasix, monitor response, f/u BNP  GI: Rx/ prophylaxis c PPI/H2B  Heme: Rx/VT prophylaxis  Discussed with medical team, ID, PGY, health care proxy.  BiPAP at bedside  DNR/DNI to be discussed  Feeding tube on hold, hopefully ensure can be spoon fed.

## 2020-01-12 NOTE — PROGRESS NOTE ADULT - SUBJECTIVE AND OBJECTIVE BOX
INTERVAL HPI/OVERNIGHT EVENTS:    OVERNIGHT: No overnight events.  SUBJECTIVE: Patient seen and examined at bedside. No acute events. FOund to have new scrotal edema.       OBJECTIVE:    VITAL SIGNS:  ICU Vital Signs Last 24 Hrs  T(C): 36.9 (12 Jan 2020 06:32), Max: 36.9 (11 Jan 2020 17:37)  T(F): 98.5 (12 Jan 2020 06:32), Max: 98.5 (12 Jan 2020 06:32)  HR: 73 (12 Jan 2020 06:32) (62 - 74)  BP: 90/50 (12 Jan 2020 06:32) (90/50 - 113/56)  BP(mean): --  ABP: --  ABP(mean): --  RR: 19 (12 Jan 2020 06:32) (18 - 20)  SpO2: 96% (12 Jan 2020 06:32) (92% - 98%)        01-11 @ 07:01  -  01-12 @ 07:00  --------------------------------------------------------  IN: 1850 mL / OUT: 800 mL / NET: 1050 mL      CAPILLARY BLOOD GLUCOSE      POCT Blood Glucose.: 149 mg/dL (12 Jan 2020 08:23)      PHYSICAL EXAM:  General: NAD, comfortable, cachectic   HEENT: NCAT, PERRL, clear conjunctiva, no scleral icterus  Neck: supple, no JVD  Respiratory: CTA b/l, no wheezing, rhonchi, rales  Cardiovascular: RRR, normal S1S2, no M/R/G  Vascular: 2+ radial and DP pulses  Abdomen: soft, NT/ND, bowel sounds in all four quadrants, no palpable masses  Extremities: WWP, no clubbing, cyanosis, or edema  Skin: Stage 2 pressure injury to sacrum measuring approx 7x5 cm, pink wound bed with darker center.   Neuro: Patient on BiPAP and not answering questions to assess orientation; moves limbs spontaneously; opens and closes eyes     MEDICATIONS:  MEDICATIONS  (STANDING):  clopidogrel Tablet 75 milliGRAM(s) Oral daily  dextrose 5%. 1000 milliLiter(s) (50 mL/Hr) IV Continuous <Continuous>  dextrose 5%. 1000 milliLiter(s) (50 mL/Hr) IV Continuous <Continuous>  dextrose 50% Injectable 12.5 Gram(s) IV Push once  dextrose 50% Injectable 25 Gram(s) IV Push once  dextrose 50% Injectable 25 Gram(s) IV Push once  heparin  Injectable 5000 Unit(s) SubCutaneous every 12 hours  insulin glargine Injectable (LANTUS) 10 Unit(s) SubCutaneous at bedtime  insulin lispro (HumaLOG) corrective regimen sliding scale   SubCutaneous Before meals and at bedtime  levothyroxine Injectable 90 MICROGram(s) IV Push <User Schedule>  piperacillin/tazobactam IVPB.. 2.25 Gram(s) IV Intermittent every 6 hours  simvastatin 20 milliGRAM(s) Oral at bedtime    MEDICATIONS  (PRN):  dextrose 40% Gel 15 Gram(s) Oral once PRN Blood Glucose LESS THAN 70 milliGRAM(s)/deciliter  glucagon  Injectable 1 milliGRAM(s) IntraMuscular once PRN Glucose LESS THAN 70 milligrams/deciliter      ALLERGIES:  Allergies    No Known Allergies    Intolerances        LABS:                        8.2    12.46 )-----------( 174      ( 12 Jan 2020 07:55 )             27.3     01-12    137  |  108  |  29<H>  ----------------------------<  154<H>  3.6   |  20<L>  |  1.30    Ca    7.0<L>      12 Jan 2020 07:55  Phos  2.6     01-11  Mg     1.8     01-12    TPro  4.3<L>  /  Alb  1.3<L>  /  TBili  1.3<H>  /  DBili  x   /  AST  50<H>  /  ALT  38  /  AlkPhos  101  01-11          RADIOLOGY & ADDITIONAL TESTS: Reviewed.

## 2020-01-12 NOTE — PROGRESS NOTE ADULT - PROBLEM SELECTOR PLAN 1
Pt continuing to improve  - received vancomycin 1000 mg yesterday; will f/u on 2 PM vanc trough today for goal 15.   - c/w zosyn 2.25 mg IV q 6 hrs  - ID following  - S+S, FEES exam found dysphagia  - GI Dr. Wright did not feel pt good canditate for PEG    - pulm recommended CT chest, which found CT chest found a large air/fluid ovoid area compatible with pulmonary abscess. Found large bilateral pleural effusions R >L. 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     #Aspiration  -S+S found severe dysphagia, would need PEG or palliative  -pt to be NPO during time, and on D5 50 cc/hr (as pt became hypernatremic and hyperchloremic on 1/2 NS) for maintenance fluids Pt continuing to improve  - received vancomycin 1000 mg yesterday; will f/u on 2 PM vanc trough today for goal 15.   - c/w zosyn 2.25 mg IV q 6 hrs  - ID following  - S+S, FEES exam found dysphagia  - GI Dr. Wright did not feel pt good canditate for PEG    - pulm recommended CT chest, which found CT chest found a large air/fluid ovoid area compatible with pulmonary abscess. Found large bilateral pleural effusions R >L. 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     #Aspiration  -S+S found severe dysphagia, would need PEG or palliative  -not candidate for PEG per GI  -HCP accepts risk of aspiration and would like to feed patient for comfort  -pt to be NPO during time, and on D5 50 cc/hr (as pt became hypernatremic and hyperchloremic on 1/2 NS) for maintenance fluids

## 2020-01-12 NOTE — PROGRESS NOTE ADULT - REASON FOR ADMISSION
sepsis secondary to aspiration pneumonia

## 2020-01-13 VITALS
OXYGEN SATURATION: 96 % | TEMPERATURE: 97 F | RESPIRATION RATE: 20 BRPM | DIASTOLIC BLOOD PRESSURE: 68 MMHG | HEART RATE: 71 BPM | SYSTOLIC BLOOD PRESSURE: 114 MMHG

## 2020-01-13 LAB
ALBUMIN SERPL ELPH-MCNC: 1.2 G/DL — LOW (ref 3.3–5)
ALP SERPL-CCNC: 113 U/L — SIGNIFICANT CHANGE UP (ref 40–120)
ALT FLD-CCNC: 37 U/L — SIGNIFICANT CHANGE UP (ref 10–45)
ANION GAP SERPL CALC-SCNC: 9 MMOL/L — SIGNIFICANT CHANGE UP (ref 5–17)
AST SERPL-CCNC: 43 U/L — HIGH (ref 10–40)
BILIRUB SERPL-MCNC: 1 MG/DL — SIGNIFICANT CHANGE UP (ref 0.2–1.2)
BUN SERPL-MCNC: 27 MG/DL — HIGH (ref 7–23)
CALCIUM SERPL-MCNC: 7.2 MG/DL — LOW (ref 8.4–10.5)
CHLORIDE SERPL-SCNC: 104 MMOL/L — SIGNIFICANT CHANGE UP (ref 96–108)
CO2 SERPL-SCNC: 21 MMOL/L — LOW (ref 22–31)
CREAT SERPL-MCNC: 1.27 MG/DL — SIGNIFICANT CHANGE UP (ref 0.5–1.3)
GLUCOSE BLDC GLUCOMTR-MCNC: 116 MG/DL — HIGH (ref 70–99)
GLUCOSE BLDC GLUCOMTR-MCNC: 185 MG/DL — HIGH (ref 70–99)
GLUCOSE SERPL-MCNC: 199 MG/DL — HIGH (ref 70–99)
HCT VFR BLD CALC: 27.1 % — LOW (ref 39–50)
HGB BLD-MCNC: 8.6 G/DL — LOW (ref 13–17)
MAGNESIUM SERPL-MCNC: 1.9 MG/DL — SIGNIFICANT CHANGE UP (ref 1.6–2.6)
MCHC RBC-ENTMCNC: 25.8 PG — LOW (ref 27–34)
MCHC RBC-ENTMCNC: 31.7 GM/DL — LOW (ref 32–36)
MCV RBC AUTO: 81.4 FL — SIGNIFICANT CHANGE UP (ref 80–100)
NRBC # BLD: 0 /100 WBCS — SIGNIFICANT CHANGE UP (ref 0–0)
PHOSPHATE SERPL-MCNC: 3.2 MG/DL — SIGNIFICANT CHANGE UP (ref 2.5–4.5)
PLATELET # BLD AUTO: 212 K/UL — SIGNIFICANT CHANGE UP (ref 150–400)
POTASSIUM SERPL-MCNC: 3.5 MMOL/L — SIGNIFICANT CHANGE UP (ref 3.5–5.3)
POTASSIUM SERPL-SCNC: 3.5 MMOL/L — SIGNIFICANT CHANGE UP (ref 3.5–5.3)
PROT SERPL-MCNC: 4.7 G/DL — LOW (ref 6–8.3)
RBC # BLD: 3.33 M/UL — LOW (ref 4.2–5.8)
RBC # FLD: 17.3 % — HIGH (ref 10.3–14.5)
SODIUM SERPL-SCNC: 134 MMOL/L — LOW (ref 135–145)
WBC # BLD: 13.87 K/UL — HIGH (ref 3.8–10.5)
WBC # FLD AUTO: 13.87 K/UL — HIGH (ref 3.8–10.5)

## 2020-01-13 PROCEDURE — 87631 RESP VIRUS 3-5 TARGETS: CPT

## 2020-01-13 PROCEDURE — 82803 BLOOD GASES ANY COMBINATION: CPT

## 2020-01-13 PROCEDURE — 85730 THROMBOPLASTIN TIME PARTIAL: CPT

## 2020-01-13 PROCEDURE — 87040 BLOOD CULTURE FOR BACTERIA: CPT

## 2020-01-13 PROCEDURE — 85610 PROTHROMBIN TIME: CPT

## 2020-01-13 PROCEDURE — 71250 CT THORAX DX C-: CPT

## 2020-01-13 PROCEDURE — 82010 KETONE BODYS QUAN: CPT

## 2020-01-13 PROCEDURE — 80202 ASSAY OF VANCOMYCIN: CPT

## 2020-01-13 PROCEDURE — 85027 COMPLETE CBC AUTOMATED: CPT

## 2020-01-13 PROCEDURE — 93005 ELECTROCARDIOGRAM TRACING: CPT

## 2020-01-13 PROCEDURE — 84443 ASSAY THYROID STIM HORMONE: CPT

## 2020-01-13 PROCEDURE — 73630 X-RAY EXAM OF FOOT: CPT

## 2020-01-13 PROCEDURE — 85045 AUTOMATED RETICULOCYTE COUNT: CPT

## 2020-01-13 PROCEDURE — 94660 CPAP INITIATION&MGMT: CPT

## 2020-01-13 PROCEDURE — 82962 GLUCOSE BLOOD TEST: CPT

## 2020-01-13 PROCEDURE — 92612 ENDOSCOPY SWALLOW (FEES) VID: CPT

## 2020-01-13 PROCEDURE — 82728 ASSAY OF FERRITIN: CPT

## 2020-01-13 PROCEDURE — 83036 HEMOGLOBIN GLYCOSYLATED A1C: CPT

## 2020-01-13 PROCEDURE — 96374 THER/PROPH/DIAG INJ IV PUSH: CPT

## 2020-01-13 PROCEDURE — 83605 ASSAY OF LACTIC ACID: CPT

## 2020-01-13 PROCEDURE — 83550 IRON BINDING TEST: CPT

## 2020-01-13 PROCEDURE — 83735 ASSAY OF MAGNESIUM: CPT

## 2020-01-13 PROCEDURE — 84484 ASSAY OF TROPONIN QUANT: CPT

## 2020-01-13 PROCEDURE — 86850 RBC ANTIBODY SCREEN: CPT

## 2020-01-13 PROCEDURE — 92610 EVALUATE SWALLOWING FUNCTION: CPT

## 2020-01-13 PROCEDURE — 70450 CT HEAD/BRAIN W/O DYE: CPT

## 2020-01-13 PROCEDURE — 71045 X-RAY EXAM CHEST 1 VIEW: CPT

## 2020-01-13 PROCEDURE — 87086 URINE CULTURE/COLONY COUNT: CPT

## 2020-01-13 PROCEDURE — 99285 EMERGENCY DEPT VISIT HI MDM: CPT | Mod: 25

## 2020-01-13 PROCEDURE — 80053 COMPREHEN METABOLIC PANEL: CPT

## 2020-01-13 PROCEDURE — 87449 NOS EACH ORGANISM AG IA: CPT

## 2020-01-13 PROCEDURE — 86901 BLOOD TYPING SEROLOGIC RH(D): CPT

## 2020-01-13 PROCEDURE — 36415 COLL VENOUS BLD VENIPUNCTURE: CPT

## 2020-01-13 PROCEDURE — 80048 BASIC METABOLIC PNL TOTAL CA: CPT

## 2020-01-13 PROCEDURE — 83540 ASSAY OF IRON: CPT

## 2020-01-13 PROCEDURE — 81003 URINALYSIS AUTO W/O SCOPE: CPT

## 2020-01-13 PROCEDURE — 84466 ASSAY OF TRANSFERRIN: CPT

## 2020-01-13 PROCEDURE — 85025 COMPLETE CBC W/AUTO DIFF WBC: CPT

## 2020-01-13 PROCEDURE — 96375 TX/PRO/DX INJ NEW DRUG ADDON: CPT

## 2020-01-13 PROCEDURE — 84100 ASSAY OF PHOSPHORUS: CPT

## 2020-01-13 RX ORDER — NATEGLINIDE 60 MG/1
1 TABLET, COATED ORAL
Qty: 0 | Refills: 0 | DISCHARGE

## 2020-01-13 RX ORDER — CLOPIDOGREL BISULFATE 75 MG/1
1 TABLET, FILM COATED ORAL
Qty: 0 | Refills: 0 | DISCHARGE
Start: 2020-01-13

## 2020-01-13 RX ORDER — POTASSIUM CHLORIDE 20 MEQ
10 PACKET (EA) ORAL
Refills: 0 | Status: COMPLETED | OUTPATIENT
Start: 2020-01-13 | End: 2020-01-13

## 2020-01-13 RX ORDER — SIMVASTATIN 20 MG/1
1 TABLET, FILM COATED ORAL
Qty: 0 | Refills: 0 | DISCHARGE
Start: 2020-01-13

## 2020-01-13 RX ORDER — LEVOTHYROXINE SODIUM 125 MCG
1 TABLET ORAL
Qty: 0 | Refills: 0 | DISCHARGE

## 2020-01-13 RX ORDER — SITAGLIPTIN 50 MG/1
1 TABLET, FILM COATED ORAL
Qty: 0 | Refills: 0 | DISCHARGE

## 2020-01-13 RX ADMIN — HEPARIN SODIUM 5000 UNIT(S): 5000 INJECTION INTRAVENOUS; SUBCUTANEOUS at 06:07

## 2020-01-13 RX ADMIN — CLOPIDOGREL BISULFATE 75 MILLIGRAM(S): 75 TABLET, FILM COATED ORAL at 12:50

## 2020-01-13 RX ADMIN — Medication 100 MILLIEQUIVALENT(S): at 09:13

## 2020-01-13 RX ADMIN — Medication 100 MILLIEQUIVALENT(S): at 11:26

## 2020-01-13 RX ADMIN — Medication 100 MILLIEQUIVALENT(S): at 10:27

## 2020-01-13 RX ADMIN — PIPERACILLIN AND TAZOBACTAM 200 GRAM(S): 4; .5 INJECTION, POWDER, LYOPHILIZED, FOR SOLUTION INTRAVENOUS at 06:06

## 2020-01-13 RX ADMIN — Medication 90 MICROGRAM(S): at 09:13

## 2020-01-13 RX ADMIN — PIPERACILLIN AND TAZOBACTAM 200 GRAM(S): 4; .5 INJECTION, POWDER, LYOPHILIZED, FOR SOLUTION INTRAVENOUS at 12:50

## 2020-01-13 RX ADMIN — Medication 1: at 09:12

## 2020-01-13 NOTE — SWALLOW BEDSIDE ASSESSMENT ADULT - SPECIFY REASON(S)
Determine safest diet for comfort feeds. Pt is not a candidate for alternative nutrition/hydration per GI.

## 2020-01-13 NOTE — SWALLOW BEDSIDE ASSESSMENT ADULT - SWALLOW EVAL: DIAGNOSIS
Mild oral and suspected pharyngeal dysphagia
Moderate oral and suspected pharyngeal dysphagia. Pt is at high aspiration risk. PO diet is recommended as comfort feeds with the understanding of the risks by Pt daughter

## 2020-01-13 NOTE — PROGRESS NOTE ADULT - SUBJECTIVE AND OBJECTIVE BOX
CC/ HPI Patient is a 89 year old male with CAD, HTN, PVD, LLE vascular procedure admitted with severe sepsis secondary to aspiration pneumonia, dysphagia, seen this morning the patient is more alert and is denies respiratory complaint.    PAST MEDICAL & SURGICAL HISTORY:  PVD (peripheral vascular disease)  CAD (coronary artery disease)  Hyperlipidemia  Hypertension  Diabetes  PVD (peripheral vascular disease): s/p stent placement in left leg 3 months ago    SOCHX:   -  alcohol    FMHX: FA/MO  - contributory     ROS reviewed below with positive findings marked (+) :  GEN:  fever, chills ENT: tracheostomy,   epistaxis,  sinusitis COR: +CAD, CHF,  +HTN, dysrhythmia PUL: COPD, ILD, asthma, pneumonia GI: PEG, dysphagia, hemorrhage, other MANDO: kidney disease, electrolyte disorder HEM:  anemia, thrombus, coagulopathy, cancer ENDO:  thyroid disease,+ diabetes mellitus CNS:  dementia, stroke, seizure, PSY:  depression, anxiety, other          MEDICATIONS  (STANDING):  clopidogrel Tablet 75 milliGRAM(s) Oral daily  dextrose 5%. 1000 milliLiter(s) (50 mL/Hr) IV Continuous <Continuous>  heparin  Injectable 5000 Unit(s) SubCutaneous every 12 hours  insulin glargine Injectable (LANTUS) 10 Unit(s) SubCutaneous at bedtime  insulin lispro (HumaLOG) corrective regimen sliding scale   SubCutaneous Before meals and at bedtime  levothyroxine Injectable 90 MICROGram(s) IV Push <User Schedule>  piperacillin/tazobactam IVPB.. 2.25 Gram(s) IV Intermittent every 6 hours  potassium chloride  10 mEq/100 mL IVPB 10 milliEquivalent(s) IV Intermittent every 1 hour  potassium chloride  20 mEq/100 mL IVPB 20 milliEquivalent(s) IV Intermittent once  simvastatin 20 milliGRAM(s) Oral at bedtime    MEDICATIONS  (PRN):  dextrose 40% Gel 15 Gram(s) Oral once PRN Blood Glucose LESS THAN 70 milliGRAM(s)/deciliter  glucagon  Injectable 1 milliGRAM(s) IntraMuscular once PRN Glucose LESS THAN 70 milligrams/deciliter      Vital Signs Last 24 Hrs  T(C): 36.4 (13 Jan 2020 05:27), Max: 36.9 (12 Jan 2020 14:05)  T(F): 97.6 (13 Jan 2020 05:27), Max: 98.4 (12 Jan 2020 14:05)  HR: 64 (13 Jan 2020 05:27) (59 - 70)  BP: 95/48 (13 Jan 2020 05:27) (95/48 - 118/63)  RR: 19 (13 Jan 2020 05:27) (18 - 22)  SpO2: 98% (13 Jan 2020 05:27) (96% - 98%)    GENERAL:         comfortable,  - distress.  HEENT:            - trauma,  - icterus,  - injection,  - nasal discharge.  NECK:              - jugular venous distention, - thyromegaly.  LYMPH:           - lymphadenopathy, - masses.  RESP:              + crackles   - rhonchi,   - wheezes.   COR:                S1S2   - gallops,  - rubs.  ABD:                bowel sounds,   soft, - tender, - distended, - organomegaly.  EXT/MSC:         - cyanosis,  - clubbing,  edema.    NEURO:           + alert and oriented                             8.6    13.87 )-----------( 212      ( 13 Jan 2020 07:17 )             27.1     01-13    134<L>  |  104  |  27<H>  ----------------------------<  199<H>  3.5   |  21<L>  |  1.27        CT Chest No Cont (01.08.20)  8.8 x 5.9 x 5.2 cm left lower lobe ovoid area of air/fluid with surrounding consolidation compatible with a pulmonary abscess versus necrotizing pneumonia. Aspiration pneumonia, pulmonary infarction or necrotic neoplasm are included in the differential diagnosis.  Large bilateral pleural effusions right greater than left. Areas of nodular pleural thickening in the left lung base consistent with empyema versus malignant pleural effusion. Near complete left lower lobe and complete right lower lobe collapse. There are areas of low attenuation within the right lower lobe collapse consistent with pulmonary nodules versus pneumonia. Small ground-glass infiltrates in the upper lobes right greater than left.       ASSESSMENT/PLAN    1) Pneumonia, necrotizing pneumonia/abscess.  2) Pleural effusion  3) Atelectasis  4) Hypoalbuminemia   5) Dysphagia    Satisfactory SpO2   Bronchodilators:  Atrovent/ albuterol q 4 – 6 hours as needed  ID/Antibiotics:  piperacillin/tazobactam, vancomycin  Cardiac/HTN:  hemodynamically stable, diuresis as needed  GI: Rx/ prophylaxis c PPI/H2B  Heme: Rx/VT prophylaxis  Discussed with medical team  Discussed with Dr. Bean who covered  Jan 11-12.

## 2020-01-13 NOTE — SWALLOW BEDSIDE ASSESSMENT ADULT - SWALLOW EVAL: RECOMMENDED FEEDING/EATING TECHNIQUES
position upright (90 degrees)/crush medication (when feasible)/maintain upright posture during/after eating for 30 mins/liquids via tsp

## 2020-01-13 NOTE — SWALLOW BEDSIDE ASSESSMENT ADULT - ASR SWALLOW ASPIRATION MONITOR
pneumonia/upper respiratory infection/gurgly voice/change of breathing pattern/position upright (90Y)/fever/cough/throat clearing/oral hygiene
pneumonia/upper respiratory infection/oral hygiene/fever/gurgly voice/change of breathing pattern/position upright (90Y)/cough/throat clearing

## 2020-01-13 NOTE — DISCHARGE NOTE PROVIDER - NSDCMRMEDTOKEN_GEN_ALL_CORE_FT
Hospital Bed:   Low air loss mattress.: clopidogrel 75 mg oral tablet: 1 tab(s) orally once a day  Januvia 50 mg oral tablet: 1 tab(s) orally once a day  levoFLOXacin 250 mg oral tablet: 1 tab(s) orally once a day   nateglinide 60 mg oral tablet: 1 tab(s) orally 3 times a day (before meals)  simvastatin 20 mg oral tablet: 1 tab(s) orally once a day (at bedtime)  Synthroid 125 mcg (0.125 mg) oral tablet: 1 tab(s) orally once a day

## 2020-01-13 NOTE — DISCHARGE NOTE NURSING/CASE MANAGEMENT/SOCIAL WORK - PATIENT PORTAL LINK FT
You can access the FollowMyHealth Patient Portal offered by [facility] by registering at the following website: [email or web address]. By joining [facility]’s FollowMyHealth portal, you will also be able to view your health information using other applications (apps) compatible with our system.

## 2020-01-13 NOTE — SWALLOW BEDSIDE ASSESSMENT ADULT - NS SPL SWALLOW CLINIC TRIAL FT
Oral stage is significant for decreased bolus containment resulting in anterior spillage. Pharyngeal stage is significant for suspected delay in swallow initiation, overall weak swallow with sluggish laryngeal movement to palpation and overt signs of airway protection deficits - increased work of breathing, throat clear and cough. Cough was weak and not productive. Above noted deficits combined with h/o dysphagia as reported by family and current suspected aspiration PNA place Pt at high aspiration risk. Daughter is interested in instrumental assessment to further assess swallow function. Recommend FEES.
Oral stage is significant for decreased bolus stripping efficiency and reduced oral grading with anterior bolus loss, and prolonged bolus manipulation and transport. Pharyngeal stage is significant for overt signs of aspiration with nectar thick liquids  - wet cough post swallow, and soft signs with other trials - increased work of breathing.  Pt would benefit from slow rate of intake with frequent rest breaks to facilitate optimal coordination of respiration and deglutition. Pt daughter was educated regarding role of comfort feeds and aspiration risks and verbalized understanding.

## 2020-01-13 NOTE — SWALLOW BEDSIDE ASSESSMENT ADULT - ADDITIONAL RECOMMENDATIONS
Pt daughter made it clear that Pt and family wishes are to take all measures to minimize aspiration risk. If that warrants a feeding tube placement due to high aspiration risk, that is the desired route at this time.
All nutritional supplements have to be thickened. Pt benefits from "swallow" as verbal cue (in Yiddish) to initiate a swallow.

## 2020-01-13 NOTE — SWALLOW BEDSIDE ASSESSMENT ADULT - PHARYNGEAL PHASE
Decreased laryngeal elevation/Cough post oral intake/Delayed pharyngeal swallow/Throat clear post oral intake
Increased work of breathing/Cough post oral intake

## 2020-01-13 NOTE — DISCHARGE NOTE PROVIDER - CARE PROVIDER_API CALL
Tim Monsalve)  Medicine  [address] [place] [address]  Phone: [phone]  Fax: [phone]  Follow Up Time:

## 2020-01-13 NOTE — SWALLOW BEDSIDE ASSESSMENT ADULT - COMMENTS
Daughter at the bedside and reports Pt having difficulty swallowing at home with noted coughing with liquids and solids. Pt was taken off BIPAP ~ 2.5 hours ago
Daughter was present at the bedside for the duration of evaluation. Pt is awake, alert, responsive when addressed in [facility]

## 2020-01-13 NOTE — DISCHARGE NOTE PROVIDER - NSDCCPTREATMENT_GEN_ALL_CORE_FT
PRINCIPAL PROCEDURE  Procedure: CT chest wo con  Findings and Treatment: EXAM:  CT CHEST                        PROCEDURE DATE:  01/08/2020    IMPRESSION:  8.8 x 5.9 x 5.2 cm left lower lobe ovoid area of air/fluid with surrounding consolidation compatible with a pulmonary abscess versus necrotizing pneumonia. Aspiration pneumonia, pulmonary infarction or necrotic neoplasm are included in the differential diagnosis.  Large bilateral pleural effusions right greater than left. Areas of nodular pleural thickening in the left lung base consistent with empyema versus malignant pleural effusion. Near complete left lower lobe and complete right lower lobe collapse. There are areas of low attenuation within the right lower lobe collapse consistent with pulmonary nodules versus pneumonia. Small groundglass infiltrates in the upper lobes right greater than left.   Aortic valve calcifications which may be associated with aortic valve stenosis, echocardiogram is recommended.   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

## 2020-01-13 NOTE — DISCHARGE NOTE PROVIDER - NSDCCPCAREPLAN_GEN_ALL_CORE_FT
PRINCIPAL DISCHARGE DIAGNOSIS  Diagnosis: Aspiration pneumonia  Assessment and Plan of Treatment: You presented with aspiration pneumonia. Speech and swallow evaluated you and said that you were at high risk for further episodes of food getting stuck in the lungs. Because this can lead to respiratory distress/failure, we decided to keep you wihtout food and gave you nutrition through an IV. Speech and swallow re-evaluted you on 01/13, and they said that you would need to swallow a little bit at a time and very slowly foods that are purreed and nectar-like in consistency. Please adhere to these instructions. There is a high risk of you aspirating on this diet still. Your wife was informed of the risks, and she accepted them. We attempted to find other mechanisms to feed him, like a PEG tube; however, this was not a viable option. Please follow-up with Dr. Monsalve when you leave the hospital.      SECONDARY DISCHARGE DIAGNOSES  Diagnosis: Hypertension  Assessment and Plan of Treatment: Please continue to take your honme medication when you leave the hospital.    Diagnosis: Diabetes  Assessment and Plan of Treatment: Please continue taking your home medication.    Diagnosis: Hypothyroidism  Assessment and Plan of Treatment: Please continue with your home medication.    Diagnosis: Hyperlipidemia  Assessment and Plan of Treatment: Please continue with your home medications.    Diagnosis: CAD (coronary artery disease)  Assessment and Plan of Treatment: Please continue with your home medications.    Diagnosis: PVD (peripheral vascular disease)  Assessment and Plan of Treatment: We wrapped the legs and performed wound care. Please continue to dress the wounds at home. Please follow-up with Dr. Monsalve when you leave the hosptial. PRINCIPAL DISCHARGE DIAGNOSIS  Diagnosis: Aspiration pneumonia  Assessment and Plan of Treatment: You presented with aspiration pneumonia. Speech and swallow evaluated you and said that you were at high risk for further episodes of food getting stuck in the lungs. Because this can lead to respiratory distress/failure, we decided to keep you wihtout food and gave you nutrition through an IV. Speech and swallow re-evaluted you on 01/13, and they said that you would need to swallow a little bit at a time and very slowly foods that are purreed and nectar-like in consistency. Please adhere to these instructions. There is a high risk of you aspirating on this diet still. Your daughter was informed of the risks, and she accepted them. We attempted to find other mechanisms to feed him, like a PEG tube; however, this was not a viable option. In addition, as per infectious diseases, please start a 7-day course of Levaquin. This is not the most ideal choice of antibiotics, but given the wish to elave the hospital and not take intravenous medications, please start and finish this course of medication. Please follow-up with Dr. Monsalve when you leave the hospital.      SECONDARY DISCHARGE DIAGNOSES  Diagnosis: Hypertension  Assessment and Plan of Treatment: Please continue to take your honme medication when you leave the hospital.    Diagnosis: Diabetes  Assessment and Plan of Treatment: Please continue taking your home medication.    Diagnosis: Hypothyroidism  Assessment and Plan of Treatment: Please continue with your home medication.    Diagnosis: Hyperlipidemia  Assessment and Plan of Treatment: Please continue with your home medications.    Diagnosis: CAD (coronary artery disease)  Assessment and Plan of Treatment: Please continue with your home medications.    Diagnosis: PVD (peripheral vascular disease)  Assessment and Plan of Treatment: We wrapped the legs and performed wound care. Please continue to dress the wounds at home. Please follow-up with Dr. Monsalve when you leave the hosptial.

## 2020-01-13 NOTE — DISCHARGE NOTE PROVIDER - HOSPITAL COURSE
89M PMH DM, HTN, HLD, surgical hx of uncertain vascular procedure in left leg (presumed arthrectomy or angioplasty) presents with generalized weakness admitted with severe sepsis (bp 85/43, leukocytosis, acute respiratory failure) secondary to aspiration pneumonia was on vancomycin and zosyn in setting of new empyema on CT chest. Now to be started on Levaquin for 7 days.         # Aspiration pneumonia. Was having difficulty swallowing. Speech and swallow initially evaluated and found that he had failed. Was on vancomyucin and zosyn. ID followed. Patient not a candidate for PEG tube. CT chest found a large air/fluid ovoid area compatible with pulmonary abscess. Found large bilateral pleural effusions R >L. 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     -start 7-day course of Levaquin 250 mg daily         #Aspiration. Speech and swallow found severe dysphagia. Repeat exam by speech and swallow found that he have a slow rate of intake with frequent rest breaks to facilitate swallowing; they recommend nectar thick liquids. While here, he received D5.         # PVD. Has 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.     - vascular surgery said no surgical intervention, except dressing changes    - wound care: apply betadine to areas of gangrene/necrosis, wrap the feet with kerlex.         # CAD (coronary artery disease).  Has a history of CAD    - takes plavix 75mg daily at home         # Transaminitis. Has elevated alk phos and LFTs likely reactive in the setting of severe sepsis    - trend cmp.         # Hyperlipidemia.  Plan: history of hyperlipidemia    - on home home simvastatin 20mg daily        #Hypothyroidism    - history of hypothyroidism     - TSH 1.391    - continued on IV synthroid 90 mcg        # Hypertension. Plan: - past medical history of hypertension    - takes lisinopril/HCTZ 10/12.5mg and coreg 6.25mg BID at home    - holding home antihypertensives in the setting of sepsis.        # Diabetes.  Plan: hx DM2. holding home doses of januvia 50mg and nateglinide 60mg     - ISS    -c/w lantus 10 U    -on D5 50cc/hr for nutrition while NPO    -monitor sugars.         Inpatient treatment course:     89M PMH DM, HTN, HLD, surgical hx of uncertain vascular procedure in left leg (presumed arthrectomy or angioplasty) presents with generalized weakness admitted with severe sepsis (bp 85/43, leukocytosis, acute respiratory failure) secondary to aspiration pneumonia. He was [id number], hypotensive SBPs 90s, elevated wbc 36, lactate 3.3. Given vanc and zosyn in the ED. Blood/urine cultures sent. Given 2 L IV NS. Right pleural effusion on chest xray, troponins elevated and peaked. Pt evaluated in ED by vascular for gangrenous toes and ulcers by vascular. ICU consulted for hemodynamic instability, and they deemed his stable for the [facility]. Pt again experienced BPs to 60s/30s, tachypnea to RR 30, pt put on bipap. Repeat CXR looked the same vs more congested. Pt improved on BIPAP, tranisitoned to 4L NC. Switched to unasyn for aspiration pneumonia. S+S FEES found pt w/ severe dysphagia, and recommended NPO. GI consuled for PEG per family. started fluids with D5 for nutrition + hypernatremia/hyperchloremia. 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 Ordered CTH for Toxic metabolic encepholopathy per neuro, which found no acute pathological cahnges. CT chest showed empyema v abscess, large bilateral effusions with consolidation. As per ID, started vancomycin and zosyn on 01/09. Found a large stage 2 pressure ulcer on sacrum. On 01/10, vancomycin trough 5.4 and increased vancomycin dose to 1000 mg with additional vanc trough to be assessed the next day. Another speech and swallow assessment was made on 01/13, and they recommended a slow rate of intake with frequent rest breaks to facilitate swallowing; they recommend nectar thick liquids.         New medications: Levaquin 250 mg PO daily for 7 days    Labs to be followed outpatient: None    Exam to be followed outpatient: None 89M PMH DM, HTN, HLD, surgical hx of uncertain vascular procedure in left leg (presumed arthrectomy or angioplasty) presents with generalized weakness admitted with severe sepsis (bp 85/43, leukocytosis, acute respiratory failure) secondary to aspiration pneumonia was on vancomycin and zosyn in setting of new empyema on CT chest. Now to be started on Levaquin for 7 days.         # Aspiration pneumonia. Was having difficulty swallowing. Speech and swallow initially evaluated and found that he had failed. Was on vancomyucin and zosyn. ID followed. Patient not a candidate for PEG tube. CT chest found a large air/fluid ovoid area compatible with pulmonary abscess. Found large bilateral pleural effusions R >L. 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     -start 7-day course of Levaquin 250 mg daily, note, these antibiotics are not ideal, but given decision to go home, it was decided with ID to start this course.         #Aspiration. Speech and swallow found severe dysphagia. Repeat exam by speech and swallow found that he have a slow rate of intake with frequent rest breaks to facilitate swallowing; they recommend nectar thick liquids. While here, he received D5.         # PVD. Has 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.     - vascular surgery said no surgical intervention, except dressing changes    - wound care: apply betadine to areas of gangrene/necrosis, wrap the feet with kerlex.         # CAD (coronary artery disease).  Has a history of CAD    - takes plavix 75mg daily at home         # Transaminitis. Has elevated alk phos and LFTs likely reactive in the setting of severe sepsis    - trend cmp.         # Hyperlipidemia.  Plan: history of hyperlipidemia    - on home home simvastatin 20mg daily        #Hypothyroidism    - history of hypothyroidism     - TSH 1.391    - continued on IV synthroid 90 mcg        # Hypertension. Plan: - past medical history of hypertension    - takes lisinopril/HCTZ 10/12.5mg and coreg 6.25mg BID at home    - holding home antihypertensives in the setting of sepsis.        # Diabetes.  Plan: hx DM2. holding home doses of januvia 50mg and nateglinide 60mg     - no lantus at home             Inpatient treatment course:     89M PMH DM, HTN, HLD, surgical hx of uncertain vascular procedure in left leg (presumed arthrectomy or angioplasty) presents with generalized weakness admitted with severe sepsis (bp 85/43, leukocytosis, acute respiratory failure) secondary to aspiration pneumonia. He was [id number], hypotensive SBPs 90s, elevated wbc 36, lactate 3.3. Given vanc and zosyn in the ED. Blood/urine cultures sent. Given 2 L IV NS. Right pleural effusion on chest xray, troponins elevated and peaked. Pt evaluated in ED by vascular for gangrenous toes and ulcers by vascular. ICU consulted for hemodynamic instability, and they deemed his stable for the [facility]. Pt again experienced BPs to 60s/30s, tachypnea to RR 30, pt put on bipap. Repeat CXR looked the same vs more congested. Pt improved on BIPAP, tranisitoned to 4L NC. Switched to unasyn for aspiration pneumonia. S+S FEES found pt w/ severe dysphagia, and recommended NPO. GI consuled for PEG per family. started fluids with D5 for nutrition + hypernatremia/hyperchloremia. 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 Ordered CTH for Toxic metabolic encepholopathy per neuro, which found no acute pathological cahnges. CT chest showed empyema v abscess, large bilateral effusions with consolidation. As per ID, started vancomycin and zosyn on 01/09. Found a large stage 2 pressure ulcer on sacrum. On 01/10, vancomycin trough 5.4 and increased vancomycin dose to 1000 mg with additional vanc trough to be assessed the next day. Another speech and swallow assessment was made on 01/13, and they recommended a slow rate of intake with frequent rest breaks to facilitate swallowing; they recommend nectar thick liquids. The daughter understands that the patient has a poor prognosis, and she wishes to take him out of the hospital so that he can be comfortable at home. The daughter also udnerstands the patient has a risk of aspirating even on the new speech and swallow recommendations made on 01/13/2019.         New medications: Levaquin 250 mg PO daily for 7 days    Labs to be followed outpatient: None    Exam to be followed outpatient: None 89M PMH DM, HTN, HLD, surgical hx of uncertain vascular procedure in left leg (presumed arthrectomy or angioplasty) presents with generalized weakness admitted with severe sepsis (bp 85/43, leukocytosis, acute respiratory failure) secondary to aspiration pneumonia was on vancomycin and zosyn in setting of new empyema on CT chest. Now to be started on Levaquin for 7 days.         # Aspiration pneumonia. Reported difficulty swallowing. Speech and swallow evaluation found patient had severe dysphagia which was likely cause of large aspiration pneumonia. Patient was being treated with Unasyn which was escalated to vanc/Zosyn per ID recs after large effusions with abscess vs empyema was found on CT imaging. Patient was not a candidate for alternative means of nutrition (PEG tube) or any surgical or IR intervention for the pneumonia given his current condition (functional paraplegic, advanced age, tenuous resp status).  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 After much discussion w between the patient, HCP and medical team, it was ultimately decided to discharge the patient home on oral antibiotics. HCP wanted to bring the patient home to be around family, friends and in the comforts of home understanding that the prognosis is poor.  She would rather the patient die at home than in the hospital.  Having said this, HCP would still not make patient DNR/DNI given the patient's own request and therefore would not qualify for home hospice. She also did not want to be aggressive by any means and wanted only PO antibiotics (understanding this was suboptimal treatment and accepting the risk) since patient was able to demonstrate an ability to swallow on day of discharge.     -start 7-day course of Levaquin 250 mg daily, note, these antibiotics are not ideal, but given decision to go home, it was decided with ID to start this course.         #Aspiration. Speech and swallow found severe dysphagia. Repeat exam by speech and swallow found that he have a slow rate of intake with frequent rest breaks to facilitate swallowing; they recommend nectar thick liquids. While here, he received D5.         # PVD. Has 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.     - vascular surgery said no surgical intervention, except dressing changes    - wound care: apply betadine to areas of gangrene/necrosis, wrap the feet with kerlex.         # CAD (coronary artery disease).  Has a history of CAD    - takes plavix 75mg daily at home         # Transaminitis. Has elevated alk phos and LFTs likely reactive in the setting of severe sepsis    - trend cmp.         # Hyperlipidemia.  Plan: history of hyperlipidemia    - on home home simvastatin 20mg daily        #Hypothyroidism    - history of hypothyroidism     - TSH 1.391    - continued on IV synthroid 90 mcg        # Hypertension. Plan: - past medical history of hypertension    - takes lisinopril/HCTZ 10/12.5mg and coreg 6.25mg BID at home    - holding home antihypertensives in the setting of sepsis.        # Diabetes.  Plan: hx DM2. holding home doses of januvia 50mg and nateglinide 60mg     - no lantus at home             Inpatient treatment course:     89M PMH DM, HTN, HLD, surgical hx of uncertain vascular procedure in left leg (presumed arthrectomy or angioplasty) presents with generalized weakness admitted with severe sepsis (bp 85/43, leukocytosis, acute respiratory failure) secondary to aspiration pneumonia. He was [id number], hypotensive SBPs 90s, elevated wbc 36, lactate 3.3. Given vanc and zosyn in the ED. Blood/urine cultures sent. Given 2 L IV NS. Right pleural effusion on chest xray, troponins elevated and peaked. Pt evaluated in ED by vascular for gangrenous toes and ulcers by vascular. ICU consulted for hemodynamic instability, and they deemed his stable for the [facility]. Pt again experienced BPs to 60s/30s, tachypnea to RR 30, pt put on bipap. Repeat CXR looked the same vs more congested. Pt improved on BIPAP, tranisitoned to 4L NC. Switched to unasyn for aspiration pneumonia. S+S FEES found pt w/ severe dysphagia, and recommended NPO. GI consuled for PEG per family. started fluids with D5 for nutrition + hypernatremia/hyperchloremia. 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 Ordered CTH for Toxic metabolic encepholopathy per neuro, which found no acute pathological cahnges. CT chest showed empyema v abscess, large bilateral effusions with consolidation. As per ID, started vancomycin and zosyn on 01/09. Found a large stage 2 pressure ulcer on sacrum. On 01/10, vancomycin trough 5.4 and increased vancomycin dose to 1000 mg with additional vanc trough to be assessed the next day. Another speech and swallow assessment was made on 01/13 for the safest diet possible, and they recommended a slow rate of intake with frequent rest breaks to facilitate swallowing; they recommend nectar thick liquids. The daughter understands that the patient has a poor prognosis, and she wishes to take him out of the hospital so that he can be comfortable at home. The daughter also udnerstands the patient has a risk of aspirating even on the new speech and swallow recommendations made on 01/13/2020.         New medications: Levaquin 250 mg PO daily for 7 days    Labs to be followed outpatient: None    Exam to be followed outpatient: None

## 2020-01-16 DIAGNOSIS — A41.9 SEPSIS, UNSPECIFIED ORGANISM: ICD-10-CM

## 2020-01-16 DIAGNOSIS — I10 ESSENTIAL (PRIMARY) HYPERTENSION: ICD-10-CM

## 2020-01-16 DIAGNOSIS — E11.52 TYPE 2 DIABETES MELLITUS WITH DIABETIC PERIPHERAL ANGIOPATHY WITH GANGRENE: ICD-10-CM

## 2020-01-16 DIAGNOSIS — Z79.84 LONG TERM (CURRENT) USE OF ORAL HYPOGLYCEMIC DRUGS: ICD-10-CM

## 2020-01-16 DIAGNOSIS — L97.529 NON-PRESSURE CHRONIC ULCER OF OTHER PART OF LEFT FOOT WITH UNSPECIFIED SEVERITY: ICD-10-CM

## 2020-01-16 DIAGNOSIS — I70.261 ATHEROSCLEROSIS OF NATIVE ARTERIES OF EXTREMITIES WITH GANGRENE, RIGHT LEG: ICD-10-CM

## 2020-01-16 DIAGNOSIS — E11.621 TYPE 2 DIABETES MELLITUS WITH FOOT ULCER: ICD-10-CM

## 2020-01-16 DIAGNOSIS — D63.8 ANEMIA IN OTHER CHRONIC DISEASES CLASSIFIED ELSEWHERE: ICD-10-CM

## 2020-01-16 DIAGNOSIS — E87.0 HYPEROSMOLALITY AND HYPERNATREMIA: ICD-10-CM

## 2020-01-16 DIAGNOSIS — Z98.890 OTHER SPECIFIED POSTPROCEDURAL STATES: ICD-10-CM

## 2020-01-16 DIAGNOSIS — J98.11 ATELECTASIS: ICD-10-CM

## 2020-01-16 DIAGNOSIS — J96.01 ACUTE RESPIRATORY FAILURE WITH HYPOXIA: ICD-10-CM

## 2020-01-16 DIAGNOSIS — G92 TOXIC ENCEPHALOPATHY: ICD-10-CM

## 2020-01-16 DIAGNOSIS — E78.5 HYPERLIPIDEMIA, UNSPECIFIED: ICD-10-CM

## 2020-01-16 DIAGNOSIS — Z95.820 PERIPHERAL VASCULAR ANGIOPLASTY STATUS WITH IMPLANTS AND GRAFTS: ICD-10-CM

## 2020-01-16 DIAGNOSIS — R65.20 SEVERE SEPSIS WITHOUT SEPTIC SHOCK: ICD-10-CM

## 2020-01-16 DIAGNOSIS — E03.9 HYPOTHYROIDISM, UNSPECIFIED: ICD-10-CM

## 2020-01-16 DIAGNOSIS — E87.8 OTHER DISORDERS OF ELECTROLYTE AND FLUID BALANCE, NOT ELSEWHERE CLASSIFIED: ICD-10-CM

## 2020-01-16 DIAGNOSIS — R64 CACHEXIA: ICD-10-CM

## 2020-01-16 DIAGNOSIS — R13.10 DYSPHAGIA, UNSPECIFIED: ICD-10-CM

## 2020-01-16 DIAGNOSIS — L97.519 NON-PRESSURE CHRONIC ULCER OF OTHER PART OF RIGHT FOOT WITH UNSPECIFIED SEVERITY: ICD-10-CM

## 2020-01-16 DIAGNOSIS — J69.0 PNEUMONITIS DUE TO INHALATION OF FOOD AND VOMIT: ICD-10-CM

## 2020-01-16 DIAGNOSIS — F44.4 CONVERSION DISORDER WITH MOTOR SYMPTOM OR DEFICIT: ICD-10-CM

## 2020-01-16 DIAGNOSIS — I25.10 ATHEROSCLEROTIC HEART DISEASE OF NATIVE CORONARY ARTERY WITHOUT ANGINA PECTORIS: ICD-10-CM

## 2020-01-16 DIAGNOSIS — L89.152 PRESSURE ULCER OF SACRAL REGION, STAGE 2: ICD-10-CM

## 2020-01-29 PROBLEM — Z00.00 ENCOUNTER FOR PREVENTIVE HEALTH EXAMINATION: Status: ACTIVE | Noted: 2020-01-29

## 2021-10-22 NOTE — PATIENT PROFILE ADULT - FUNCTIONAL SCREEN CURRENT LEVEL: BATHING, MLM
Chief Complaint   Patient presents with   • Palpitations     Intermittent palpitations all day while working   He does complain of chest pain 1/10, taken for an EKG on arrival.   4 = completely dependent

## 2023-04-28 NOTE — PROGRESS NOTE ADULT - ASSESSMENT
Patient Education       Well Child Exam 9 to 10 Years   About this topic   Your child's well child exam is a visit with the doctor to check your child's health. The doctor measures your child's weight and height, and may measure your child's body mass index (BMI). The doctor plots these numbers on a growth curve. The growth curve gives a picture of your child's growth at each visit. The doctor may listen to your child's heart, lungs, and belly. Your doctor will do a full exam of your child from the head to the toes.  Your child may also need shots or blood tests during this visit.  General   Growth and Development   Your doctor will ask you how your child is developing. The doctor will focus on the skills that most children your child's age are expected to do. During this time of your child's life, here are some things you can expect.  Movement - Your child may:  Be getting stronger  Be able to use tools  Be independent when taking a bath or shower  Enjoy team or organized sports  Have better hand-eye coordination  Hearing, seeing, and talking - Your child will likely:  Have a longer attention span  Be able to memorize facts  Enjoy reading to learn new things  Be able to talk almost at the level of an adult  Feelings and behavior - Your child will likely:  Be more independent  Work to get better at a skill or school work  Begin to understand the consequences of actions  Start to worry and may rebel  Need encouragement and positive feedback  Want to spend more time with friends instead of family  Feeding - Your child needs:  3 servings of low-fat or fat-free milk each day  5 servings of fruits and vegetables each day  To start each day with a healthy breakfast  To be given a variety of healthy foods. Many children like to help cook and make food fun.  To limit fruit juice, soda, chips, candy, and foods that are high in fats  To eat meals as a part of the family. Turn the TV and cell phones off while eating. Talk  89M PMH DM, HTN, HLD, surgical hx of uncertain vascular procedure in left leg (presumed arthrectomy or angioplasty) presents with generalized weakness admitted with severe sepsis (bp 85/43, leukocytosis, acute respiratory failure) secondary to aspiration pneumonia now on on vancomycin and zosyn in setting of new empyema on CT chest. about your day, rather than focusing on what your child is eating.  Sleep - Your child:  Is likely sleeping about 10 hours in a row at night.  Should have a consistent routine before bedtime. Read to, or spend time with, your child each night before bed. When your child is able to read, encourage reading before bedtime as part of a routine.  Needs to brush and floss teeth before going to bed.  Should not have electronic devices like TVs, phones, and tablets on in the bedrooms overnight.  Shots or vaccines - It is important for your child to get a flu vaccine each year. Your child may need other shots as well, either at this visit or their next check up.  Help for Parents   Play.  Encourage your child to spend at least 1 hour each day being physically active.  Offer your child a variety of activities to take part in. Include music, sports, arts and crafts, and other things your child is interested in. Take care not to over schedule your child. One to 2 activities a week outside of school is often a good number for your child.  Make sure your child wears a helmet when using anything with wheels like skates, skateboard, bike, etc.  Encourage time spent playing with friends. Provide a safe area for play.  Read to your child. Have your child read to you.  Here are some things you can do to help keep your child safe and healthy.  Have your child brush the teeth 2 to 3 times each day. Children this age are able to floss teeth as well. Your child should also see a dentist 1 to 2 times each year for a cleaning and checkup.  Talk to your child about the dangers of smoking, drinking alcohol, and using drugs. Do not allow anyone to smoke in your home or around your child.  A booster seat is needed until your child is at least 4 feet 9 inches (145 cm) tall. After that, make sure your child uses a seat belt when riding in the car. Your child should ride in the back seat until 13 years of age.  Talk with your child about peer  pressure. Help your child learn how to handle risky things friends may want to do.  Never leave your child alone. Do not leave your child in the car or at home alone, even for a few minutes.  Protect your child from gun injuries. If you have a gun, use a trigger lock. Keep the gun locked up and the bullets kept in a separate place.  Limit screen time for children to 1 to 2 hours per day. This includes TV, phones, computers, and video games.  Talk about social media safety.  Discuss bike and skateboard safety.  Parents need to think about:  Teaching your child what to do in case of an emergency  Monitoring your childs computer use, especially when on the Internet  Talking to your child about strangers, unwanted touch, and keeping private body parts safe  How to continue to talk about puberty  Having your child help with some family chores to encourage responsibility within the family  The next well child visit will most likely be when your child is 11 years old. At this visit, your doctor may:  Do a full check up on your child  Talk about school, friends, and social skills  Talk about sexuality and sexually-transmitted diseases  Give needed vaccines  When do I need to call the doctor?   Fever of 100.4°F (38°C) or higher  Having trouble eating or sleeping  Trouble in school  You are worried about your child's development  Where can I learn more?   Centers for Disease Control and Prevention  https://www.cdc.gov/ncbddd/childdevelopment/positiveparenting/middle2.html   Healthy Children  https://www.healthychildren.org/English/ages-stages/gradeschool/Pages/Safety-for-Your-Child-10-Years.aspx   KidsHealth  http://kidshealth.org/parent/growth/medical/checkup_9yrs.html#rdc450   Last Reviewed Date   2019-10-14  Consumer Information Use and Disclaimer   This information is not specific medical advice and does not replace information you receive from your health care provider. This is only a brief summary of general  information. It does NOT include all information about conditions, illnesses, injuries, tests, procedures, treatments, therapies, discharge instructions or life-style choices that may apply to you. You must talk with your health care provider for complete information about your health and treatment options. This information should not be used to decide whether or not to accept your health care providers advice, instructions or recommendations. Only your health care provider has the knowledge and training to provide advice that is right for you.  Copyright   Copyright © 2021 [employer] and its affiliates and/or licensors. All rights reserved.

## 2024-03-07 NOTE — DISCHARGE NOTE PROVIDER - NSDCHHNEEDSERVICEOTHER_GEN_ALL_CORE_FT
Render In Strict Bullet Format?: No
Detail Level: Zone
Initiate Treatment: Itchy scalp spray: nightly PRN ([facility])
For the sacral ulcer, please see the advanced practice RN note for the wound care.
